# Patient Record
Sex: FEMALE | Race: BLACK OR AFRICAN AMERICAN | NOT HISPANIC OR LATINO | Employment: FULL TIME | URBAN - METROPOLITAN AREA
[De-identification: names, ages, dates, MRNs, and addresses within clinical notes are randomized per-mention and may not be internally consistent; named-entity substitution may affect disease eponyms.]

---

## 2017-07-07 ASSESSMENT — ENCOUNTER SYMPTOMS
HYPOTENSION: 0
ARTHRALGIAS: 0
ORTHOPNEA: 0
LIGHT-HEADEDNESS: 1
BACK PAIN: 1
HEMOPTYSIS: 0
BRUISES/BLEEDS EASILY: 0
RESPIRATORY PAIN: 0
POSTURAL DYSPNEA: 0
HYPERTENSION: 1
SLEEP DISTURBANCES DUE TO BREATHING: 0
COUGH: 1
EXERCISE INTOLERANCE: 0
JOINT SWELLING: 0
SPUTUM PRODUCTION: 1
MYALGIAS: 1
MUSCLE WEAKNESS: 0
MUSCLE CRAMPS: 1
SHORTNESS OF BREATH: 0
LEG SWELLING: 1
CLAUDICATION: 0
PALPITATIONS: 1
SNORES LOUDLY: 1
NECK PAIN: 1
DYSPNEA ON EXERTION: 0
SWOLLEN GLANDS: 1
WHEEZING: 0
TACHYCARDIA: 1
SYNCOPE: 0
LEG PAIN: 0
STIFFNESS: 1
COUGH DISTURBING SLEEP: 1

## 2017-07-11 ENCOUNTER — OFFICE VISIT (OUTPATIENT)
Dept: ENDOCRINOLOGY | Facility: CLINIC | Age: 47
End: 2017-07-11

## 2017-07-11 VITALS
OXYGEN SATURATION: 98 % | DIASTOLIC BLOOD PRESSURE: 95 MMHG | SYSTOLIC BLOOD PRESSURE: 145 MMHG | BODY MASS INDEX: 38.26 KG/M2 | HEIGHT: 62 IN | HEART RATE: 81 BPM | WEIGHT: 207.9 LBS

## 2017-07-11 DIAGNOSIS — E66.09 NON MORBID OBESITY DUE TO EXCESS CALORIES: Primary | ICD-10-CM

## 2017-07-11 RX ORDER — TOPIRAMATE 25 MG/1
TABLET, FILM COATED ORAL
Qty: 120 TABLET | Refills: 3 | Status: SHIPPED | OUTPATIENT
Start: 2017-07-11 | End: 2018-03-13 | Stop reason: DRUGHIGH

## 2017-07-11 RX ORDER — LISINOPRIL 10 MG/1
TABLET ORAL
COMMUNITY
Start: 2015-04-12 | End: 2021-07-28

## 2017-07-11 NOTE — MR AVS SNAPSHOT
After Visit Summary   7/11/2017    Mana Bull    MRN: [id number]           Patient Information     Date Of Birth          1970        Visit Information        Provider Department      7/11/2017 2:15 PM Justina Camilo MD [facility] Medical Weight Management        Today's Diagnoses     Non morbid obesity due to excess calories    -  1      Care Instructions    Start Topiramate today.    See Dr Horn in three months.    Call for questions or concerns [phone].          MEDICATION STARTED AT THIS APPOINTMENT  We are starting topiramate at bedtime.  Start one tab, 25 mg, for a week. Go up to 50 mg (2 tabs) for the next week. At the third week, take   3 tabs (75 mg).  Stay at 3 tabs until you are seen again. Call the nurse at [phone] if you have any questions or concerns. (Do not stop taking it if you don't think it's working. For some people it works even though they do not feel much different.)    Topiramate (Topamax) is a medication that is used most often to treat migraine headaches or for seizures. It has also been found to help with weight loss. Although it's not currently FDA approved for weight loss, it has been used safely for a number of years to help people who are carrying extra weight.     Just how topiramate helps with weight loss has not been exactly determined. However it seems to work on areas of the brain to quiet down signals related to eating.      Topiramate may make you:    >feel less interest in eating in between meals   >think less about food and eating   >find it easier to push the plate away   >find giving up pop easier    >have an easier time eating less    For some of our patients, the pills work right away. They feel and think quite differently about food. Other patients don't feel much of a change but find in fact they have lost weight! Like all weight loss medications, topiramate works best when you help it work.  This means:    1) Have less tempting  high calorie (fattening) food around the house or office    2) Have lower calorie food (fruits, vegetables,low fat meats and dairy) for snacks    3) Eat out only one time or less each week.   4) Eat your meals at a table with the TV or computer off.    Side-effects. Topiramate is generally well tolerated. The main side-effects we see are:   Tingling in hands,feet, or face (usually not very troublesome)   Mental confusion and word finding trouble (about 10% of patients have this.)     Feeling sleepy or a bit dopey- this goes away very soon after starting.    One of the dangers of topiramate is the possibility of birth defects--if you get pregnant when you are on it, there is the risk that your baby will be born with a cleft lip or palate.  If you are on topiramate and of child bearing age, you need to be on a reliable form of birth control or refrain from sexual intercourse.     Please refer to the pharmacy insert for more information on side-effects. Since many pharmacists are not familiar with the use of topiramate in weight loss, calling the clinic will get you the most accurate information on the use of this medication for weight loss.     In order to get refills of this or any medication we prescribe you must be seen in the medical weight mgmt clinic every 2-3 months. Please have your pharmacy fax a refill request to [phone].                  Follow-ups after your visit        Your next 10 appointments already scheduled     Oct 10, 2017  8:45 AM CDT   (Arrive by 8:30 AM)   Return Visit with Justina Camilo MD   [facility] ([facility])    [address] [place] [id number] [phone]              Future tests that were ordered for you today     Open Future Orders        Priority Expected Expires Ordered    Hemoglobin A1c Routine 7/11/2017 7/11/2018 7/11/2017    Creatinine Routine 7/11/2017 7/11/2018 7/11/2017    Hepatic panel  "Routine 7/11/2017 7/11/2018 7/11/2017            Who to contact     Please call your clinic at [phone] to:    Ask questions about your health    Make or cancel appointments    Discuss your medicines    Learn about your test results    Speak to your doctor   If you have compliments or concerns about an experience at your clinic, or if you wish to file a complaint, please contact [facility] Patient Relations at [phone] or email us at [email or web address]         Additional Information About Your Visit        [employer] Information     [employer] gives you secure access to your electronic health record. If you see a primary care provider, you can also send messages to your care team and make appointments. If you have questions, please call your primary care clinic.  If you do not have a primary care provider, please call [phone] and they will assist you.      [employer] is an electronic gateway that provides easy, online access to your medical records. With [employer], you can request a clinic appointment, read your test results, renew a prescription or communicate with your care team.     To access your existing account, please contact your [facility] or call [phone] for assistance.        Care EveryWhere ID     This is your Care EveryWhere ID. This could be used by other organizations to access your [facility] medical records  [id number]        Your Vitals Were     Pulse Height Pulse Oximetry BMI (Body Mass Index)          81 1.575 m (5' 2\") 98% 38.03 kg/m2         Blood Pressure from Last 3 Encounters:   07/11/17 (!) 145/95    Weight from Last 3 Encounters:   07/11/17 94.3 kg (207 lb 14.4 oz)                 Today's Medication Changes          These changes are accurate as of: 7/11/17  2:41 PM.  If you have any questions, ask your nurse or doctor.               Start taking these medicines.        Dose/Directions    topiramate 25 MG tablet "   Commonly known as:  TOPAMAX   Used for:  Non morbid obesity due to excess calories   Started by:  Justina Camilo MD        25 mg at bedtime for 1 week, 50 mg at bedtime for 1 week and 75 mg daily at bedtime thereafter   Quantity:  120 tablet   Refills:  3            Where to get your medicines      These medications were sent to [place], [place] - [address]  [address], [facility] [address]    Hours:  TRANSPLANT PHONE NUMBER [phone] Phone:  [phone]     topiramate 25 MG tablet                Primary Care Provider    None Specified       No primary provider on file.        Equal Access to Services     [facility] : Hadfide Rosas, misty mullen, reynaldo harveymajayne childress, robert crawley. So [facility] [phone].    ATENCIÓN: Si habla español, tiene a mclaughlin disposición servicios gratuitos de asistencia lingüística. Llame al [phone].    We comply with applicable federal civil rights laws and [place] laws. We do not discriminate on the basis of race, color, national origin, age, disability sex, sexual orientation or gender identity.            Thank you!     Thank you for choosing [facility] MEDICAL WEIGHT MANAGEMENT  for your care. Our goal is always to provide you with excellent care. Hearing back from our patients is one way we can continue to improve our services. Please take a few minutes to complete the written survey that you may receive in the mail after your visit with us. Thank you!             Your Updated Medication List - Protect others around you: Learn how to safely use, store and throw away your medicines at www.disposemymeds.org.          This list is accurate as of: 7/11/17  2:41 PM.  Always use your most recent med list.                   Brand Name Dispense Instructions for use Diagnosis    lisinopril 10 MG tablet    PRINIVIL/ZESTRIL      Non morbid obesity due to  excess calories       topiramate 25 MG tablet    TOPAMAX    120 tablet    25 mg at bedtime for 1 week, 50 mg at bedtime for 1 week and 75 mg daily at bedtime thereafter    Non morbid obesity due to excess calories

## 2017-07-11 NOTE — NURSING NOTE
"Chief Complaint   Patient presents with     Weight Problem     NMWM       Vitals:    07/11/17 1405   BP: (!) 145/95   Pulse: 81   SpO2: 98%   Weight: 207 lb 14.4 oz   Height: 5' 2\"       Body mass index is 38.03 kg/(m^2).    Faby Camp CMA                          "

## 2017-07-11 NOTE — LETTER
Date:July 13, 2017      Patient was self referred, no letter generated. Do not send.        [facility] Health Information

## 2017-07-11 NOTE — PATIENT INSTRUCTIONS
Start Topiramate today.    See Dr Horn in three months.    Call for questions or concerns [phone].          MEDICATION STARTED AT THIS APPOINTMENT  We are starting topiramate at bedtime.  Start one tab, 25 mg, for a week. Go up to 50 mg (2 tabs) for the next week. At the third week, take   3 tabs (75 mg).  Stay at 3 tabs until you are seen again. Call the nurse at [phone] if you have any questions or concerns. (Do not stop taking it if you don't think it's working. For some people it works even though they do not feel much different.)    Topiramate (Topamax) is a medication that is used most often to treat migraine headaches or for seizures. It has also been found to help with weight loss. Although it's not currently FDA approved for weight loss, it has been used safely for a number of years to help people who are carrying extra weight.     Just how topiramate helps with weight loss has not been exactly determined. However it seems to work on areas of the brain to quiet down signals related to eating.      Topiramate may make you:    >feel less interest in eating in between meals   >think less about food and eating   >find it easier to push the plate away   >find giving up pop easier    >have an easier time eating less    For some of our patients, the pills work right away. They feel and think quite differently about food. Other patients don't feel much of a change but find in fact they have lost weight! Like all weight loss medications, topiramate works best when you help it work.  This means:    1) Have less tempting high calorie (fattening) food around the house or office    2) Have lower calorie food (fruits, vegetables,low fat meats and dairy) for snacks    3) Eat out only one time or less each week.   4) Eat your meals at a table with the TV or computer off.    Side-effects. Topiramate is generally well tolerated. The main side-effects we see are:   Tingling in hands,feet, or face (usually not very  troublesome)   Mental confusion and word finding trouble (about 10% of patients have this.)     Feeling sleepy or a bit dopey- this goes away very soon after starting.    One of the dangers of topiramate is the possibility of birth defects--if you get pregnant when you are on it, there is the risk that your baby will be born with a cleft lip or palate.  If you are on topiramate and of child bearing age, you need to be on a reliable form of birth control or refrain from sexual intercourse.     Please refer to the pharmacy insert for more information on side-effects. Since many pharmacists are not familiar with the use of topiramate in weight loss, calling the clinic will get you the most accurate information on the use of this medication for weight loss.     In order to get refills of this or any medication we prescribe you must be seen in the medical weight mgmt clinic every 2-3 months. Please have your pharmacy fax a refill request to [phone].
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
## 2017-07-25 DIAGNOSIS — E66.09 NON MORBID OBESITY DUE TO EXCESS CALORIES: ICD-10-CM

## 2017-07-25 LAB
ALBUMIN SERPL-MCNC: 3.4 G/DL (ref 3.4–5)
ALP SERPL-CCNC: 116 U/L (ref 40–150)
ALT SERPL W P-5'-P-CCNC: 20 U/L (ref 0–50)
AST SERPL W P-5'-P-CCNC: 14 U/L (ref 0–45)
BILIRUB DIRECT SERPL-MCNC: <0.1 MG/DL (ref 0–0.2)
BILIRUB SERPL-MCNC: 0.3 MG/DL (ref 0.2–1.3)
CREAT SERPL-MCNC: 0.69 MG/DL (ref 0.52–1.04)
GFR SERPL CREATININE-BSD FRML MDRD: NORMAL ML/MIN/1.7M2
HBA1C MFR BLD: 6 % (ref 4.3–6)
PROT SERPL-MCNC: 7 G/DL (ref 6.8–8.8)

## 2017-08-05 ENCOUNTER — HEALTH MAINTENANCE LETTER (OUTPATIENT)
Age: 47
End: 2017-08-05

## 2017-10-10 ENCOUNTER — OFFICE VISIT (OUTPATIENT)
Dept: ENDOCRINOLOGY | Facility: CLINIC | Age: 47
End: 2017-10-10

## 2017-10-10 VITALS
BODY MASS INDEX: 34.89 KG/M2 | TEMPERATURE: 98.7 F | OXYGEN SATURATION: 97 % | SYSTOLIC BLOOD PRESSURE: 105 MMHG | HEIGHT: 62 IN | DIASTOLIC BLOOD PRESSURE: 72 MMHG | HEART RATE: 85 BPM | WEIGHT: 189.6 LBS

## 2017-10-10 DIAGNOSIS — E66.09 CLASS 1 OBESITY DUE TO EXCESS CALORIES WITHOUT SERIOUS COMORBIDITY WITH BODY MASS INDEX (BMI) OF 34.0 TO 34.9 IN ADULT: Primary | ICD-10-CM

## 2017-10-10 DIAGNOSIS — E66.811 CLASS 1 OBESITY DUE TO EXCESS CALORIES WITHOUT SERIOUS COMORBIDITY WITH BODY MASS INDEX (BMI) OF 34.0 TO 34.9 IN ADULT: Primary | ICD-10-CM

## 2017-10-10 ASSESSMENT — PAIN SCALES - GENERAL: PAINLEVEL: NO PAIN (0)

## 2017-10-10 NOTE — NURSING NOTE
"Chief Complaint   Patient presents with     RECHECK     R WMN       Vitals:    10/10/17 0924   BP: 105/72   BP Location: Left arm   Patient Position: Chair   Cuff Size: Adult Regular   Pulse: 85   Temp: 98.7  F (37.1  C)   SpO2: 97%   Weight: 189 lb 9.6 oz   Height: 5' 2\"       Body mass index is 34.68 kg/(m^2).      Kena Mcfarland MA                          "

## 2017-10-10 NOTE — LETTER
"10/10/2017       RE: Mana Bull  [address] [place] [address]     Dear Colleague,    Thank you for referring your patient, Mana Bull, to the [facility] MEDICAL WEIGHT MANAGEMENT at [facility]. Please see a copy of my visit note below.          Return Medical Weight Management Note     Mana Bull  MRN:  [id number]  :  1970  LORENE:  10/10/2017    Dear No primary care provider on file.,    I had the pleasure of seeing your patient Mana Bull.  She is a 46 year old female who I am continuing to see for treatment of obesity related to: hypertension, hyperlipidemia, multiple joints pain    INTERVAL HISTORY:  Last visit 17. I started her on topiramate which has helped cutting down her appetite. She denied side effects. She lost 18 lbs in the past 3 months. She stated that she has been able to cut down snack. However, she still drinks some regular soda. She follows VDO exercise program at home. She is motivated to continue with dietary modification.    CURRENT WEIGHT:   189 lbs 9.6 oz    Wt Readings from Last 4 Encounters:   10/10/17 86 kg (189 lb 9.6 oz)   17 94.3 kg (207 lb 14.4 oz)       Height:  5' 2\"  Body Mass Index:  Body mass index is 34.68 kg/(m^2).  Vital signs:   /72 (BP Location: Left arm, Patient Position: Chair, Cuff Size: Adult Regular)  Pulse 85  Temp 98.7  F (37.1  C)  Ht 1.575 m (5' 2\")  Wt 86 kg (189 lb 9.6 oz)  SpO2 97%  BMI 34.68 kg/m2  Estimated body mass index is 34.68 kg/(m^2) as calculated from the following:    Height as of this encounter: 1.575 m (5' 2\").    Weight as of this encounter: 86 kg (189 lb 9.6 oz).    Initial consult weight was 207 on 17.  Weight change since last seen on 2017 is down 18 pounds.   Total loss is 18 pounds.    Diet and Activity Changes Since Last Visit Reviewed With Patient 10/10/2017   I have made the following changes to my diet since my last visit: less sweets and " pop   With regards to my diet, I am still struggling with: exersice   For breakfast, I typically eat: oatmeal   For lunch, I typically eat: sandwich   For supper, I typically eat: salad pizza   For snack(s), I typically eat: popcorn chips   I have made the following changes to my activity/exercise since my last visit: none   With regards to my activity/exercise, I am still struggling with: energy     ROS    MEDICATIONS:   Current Outpatient Prescriptions   Medication Sig Dispense Refill     lisinopril (PRINIVIL/ZESTRIL) 10 MG tablet        topiramate (TOPAMAX) 25 MG tablet 25 mg at bedtime for 1 week, 50 mg at bedtime for 1 week and 75 mg daily at bedtime thereafter 120 tablet 3       Weight Loss Medication History Reviewed With Patient 10/10/2017   Which weight loss medications are you currently taking on a regular basis?  Topamax (topiramate)   Are you having any side effects from the weight loss medication that we have prescribed you? No       ASSESSMENT:   Mana Bull is a 46 year old female who I am continuing to see for treatment of obesity related to: hypertension, hyperlipidemia, multiple joints pain    Responded well to topiramate  Lost 18 lbs since last visit    PLAN:  - continue topiramate 75 mg daily  - continue diet modification and exercise    FOLLOW-UP:    4 months.    Time: 15 min spent on evaluation, management, counseling, education, & motivational interviewing with greater than 50 % of the total time was spent on counseling and coordinating care    Sincerely,    Justina Camilo MD    Again, thank you for allowing me to participate in the care of your patient.      Sincerely,    Justina Caimlo MD

## 2017-10-10 NOTE — MR AVS SNAPSHOT
After Visit Summary   10/10/2017    Mana Bull    MRN: [id number]           Patient Information     Date Of Birth          1970        Visit Information        Provider Department      10/10/2017 8:45 AM Justina Camilo MD M [facility] Medical Weight Management        Today's Diagnoses     Class 1 obesity due to excess calories without serious comorbidity with body mass index (BMI) of 34.0 to 34.9 in adult    -  1       Follow-ups after your visit        Your next 10 appointments already scheduled     Mar 13, 2018  4:30 PM CDT   (Arrive by 4:15 PM)   Return Visit with MD CAL Ponce [facility] Medical Weight Management ([facility])    [address]  [address] [facility] [address] [phone]              Who to contact     Please call your clinic at [phone] to:    Ask questions about your health    Make or cancel appointments    Discuss your medicines    Learn about your test results    Speak to your doctor   If you have compliments or concerns about an experience at your clinic, or if you wish to file a complaint, please contact [facility] Patient Relations at [phone] or email us at [email or web address]         Additional Information About Your Visit        MyChart Information     [employer] gives you secure access to your electronic health record. If you see a primary care provider, you can also send messages to your care team and make appointments. If you have questions, please call your primary care clinic.  If you do not have a primary care provider, please call [phone] and they will assist you.      [employer] is an electronic gateway that provides easy, online access to your medical records. With [employer], you can request a clinic appointment, read your test results, renew a prescription or communicate with your care team.     To access your existing account, please contact your  "[facility] or call [phone] for assistance.        Care EveryWhere ID     This is your Care EveryWhere ID. This could be used by other organizations to access your [facility] medical records  [id number]        Your Vitals Were     Pulse Temperature Height Pulse Oximetry BMI (Body Mass Index)       85 98.7  F (37.1  C) 1.575 m (5' 2\") 97% 34.68 kg/m2        Blood Pressure from Last 3 Encounters:   10/10/17 105/72   07/11/17 (!) 145/95    Weight from Last 3 Encounters:   10/10/17 86 kg (189 lb 9.6 oz)   07/11/17 94.3 kg (207 lb 14.4 oz)              Today, you had the following     No orders found for display       Primary Care Provider    None Specified       No primary provider on file.        Equal Access to Services     [facility]: Hadii samara Rosas, wacharly mullen, reynaldo quintanaaledenilson childress, robert olivera . So [facility] [phone].    ATENCIÓN: Si habla español, tiene a mclaughlin disposición servicios gratuitos de asistencia lingüística. Elyse al [phone].    We comply with applicable federal civil rights laws and [place] laws. We do not discriminate on the basis of race, color, national origin, age, disability, sex, sexual orientation, or gender identity.            Thank you!     Thank you for choosing [facility] WEIGHT MANAGEMENT  for your care. Our goal is always to provide you with excellent care. Hearing back from our patients is one way we can continue to improve our services. Please take a few minutes to complete the written survey that you may receive in the mail after your visit with us. Thank you!             Your Updated Medication List - Protect others around you: Learn how to safely use, store and throw away your medicines at www.disposemymeds.org.          This list is accurate as of: 10/10/17 12:56 PM.  Always use your most recent med list.                   Brand Name Dispense Instructions for use Diagnosis    " lisinopril 10 MG tablet    PRINIVIL/ZESTRIL      Non morbid obesity due to excess calories       topiramate 25 MG tablet    TOPAMAX    120 tablet    25 mg at bedtime for 1 week, 50 mg at bedtime for 1 week and 75 mg daily at bedtime thereafter    Non morbid obesity due to excess calories

## 2017-10-10 NOTE — LETTER
Date:October 11, 2017      Patient was self referred, no letter generated. Do not send.        [facility] Health Information

## 2017-10-10 NOTE — PROGRESS NOTES
"      Return Medical Weight Management Note     Mana Bull  MRN:  [id number]  :  1970  LORENE:  10/10/2017    Dear No primary care provider on file.,    I had the pleasure of seeing your patient Mana Bull.  She is a 46 year old female who I am continuing to see for treatment of obesity related to: hypertension, hyperlipidemia, multiple joints pain    INTERVAL HISTORY:  Last visit 17. I started her on topiramate which has helped cutting down her appetite. She denied side effects. She lost 18 lbs in the past 3 months. She stated that she has been able to cut down snack. However, she still drinks some regular soda. She follows [employer] exercise program at home. She is motivated to continue with dietary modification.    CURRENT WEIGHT:   189 lbs 9.6 oz    Wt Readings from Last 4 Encounters:   10/10/17 86 kg (189 lb 9.6 oz)   17 94.3 kg (207 lb 14.4 oz)       Height:  5' 2\"  Body Mass Index:  Body mass index is 34.68 kg/(m^2).  Vital signs:   /72 (BP Location: Left arm, Patient Position: Chair, Cuff Size: Adult Regular)  Pulse 85  Temp 98.7  F (37.1  C)  Ht 1.575 m (5' 2\")  Wt 86 kg (189 lb 9.6 oz)  SpO2 97%  BMI 34.68 kg/m2  Estimated body mass index is 34.68 kg/(m^2) as calculated from the following:    Height as of this encounter: 1.575 m (5' 2\").    Weight as of this encounter: 86 kg (189 lb 9.6 oz).    Initial consult weight was 207 on 17.  Weight change since last seen on 2017 is down 18 pounds.   Total loss is 18 pounds.    Diet and Activity Changes Since Last Visit Reviewed With Patient 10/10/2017   I have made the following changes to my diet since my last visit: less sweets and pop   With regards to my diet, I am still struggling with: exersice   For breakfast, I typically eat: oatmeal   For lunch, I typically eat: sandwich   For supper, I typically eat: salad pizza   For snack(s), I typically eat: popcorn chips   I have made the following changes to my " activity/exercise since my last visit: none   With regards to my activity/exercise, I am still struggling with: energy     ROS    MEDICATIONS:   Current Outpatient Prescriptions   Medication Sig Dispense Refill     lisinopril (PRINIVIL/ZESTRIL) 10 MG tablet        topiramate (TOPAMAX) 25 MG tablet 25 mg at bedtime for 1 week, 50 mg at bedtime for 1 week and 75 mg daily at bedtime thereafter 120 tablet 3       Weight Loss Medication History Reviewed With Patient 10/10/2017   Which weight loss medications are you currently taking on a regular basis?  Topamax (topiramate)   Are you having any side effects from the weight loss medication that we have prescribed you? No       ASSESSMENT:   Mana Bull is a 46 year old female who I am continuing to see for treatment of obesity related to: hypertension, hyperlipidemia, multiple joints pain    Responded well to topiramate  Lost 18 lbs since last visit    PLAN:  - continue topiramate 75 mg daily  - continue diet modification and exercise    FOLLOW-UP:    4 months.    Time: 15 min spent on evaluation, management, counseling, education, & motivational interviewing with greater than 50 % of the total time was spent on counseling and coordinating care    Sincerely,    Justina Camilo MD

## 2017-11-09 ENCOUNTER — TELEPHONE (OUTPATIENT)
Dept: ENDOCRINOLOGY | Facility: CLINIC | Age: 47
End: 2017-11-09

## 2017-11-09 DIAGNOSIS — E66.01 MORBID OBESITY (H): Primary | ICD-10-CM

## 2017-11-09 RX ORDER — PHENTERMINE HYDROCHLORIDE 15 MG/1
15 CAPSULE ORAL EVERY MORNING
Qty: 30 CAPSULE | Refills: 3 | Status: SHIPPED | OUTPATIENT
Start: 2017-11-09 | End: 2017-12-09

## 2017-11-09 NOTE — TELEPHONE ENCOUNTER
Patient voices concerns that she is having a increase in cravings, mostly sweets and chocolate. Also has a increase in food thoughts in the evening. Patient states that her B/P has been WNL and is 119/80 today. Phentermine 15 mg ordered  Per Tresa Regalado PA-C.

## 2017-11-22 DIAGNOSIS — E66.09 NON MORBID OBESITY DUE TO EXCESS CALORIES: ICD-10-CM

## 2017-12-28 ENCOUNTER — TELEPHONE (OUTPATIENT)
Dept: GASTROENTEROLOGY | Facility: CLINIC | Age: 47
End: 2017-12-28

## 2017-12-28 DIAGNOSIS — E66.01 MORBID OBESITY DUE TO EXCESS CALORIES (H): Primary | ICD-10-CM

## 2017-12-28 RX ORDER — PHENTERMINE HYDROCHLORIDE 37.5 MG/1
37.5 CAPSULE ORAL EVERY MORNING
Qty: 30 CAPSULE | Refills: 3 | Status: SHIPPED | OUTPATIENT
Start: 2017-12-28 | End: 2018-01-27

## 2017-12-28 RX ORDER — PHENTERMINE HYDROCHLORIDE 37.5 MG/1
37.5 CAPSULE ORAL EVERY MORNING
Qty: 30 CAPSULE | Refills: 5 | Status: CANCELLED | OUTPATIENT
Start: 2017-12-28

## 2017-12-28 NOTE — TELEPHONE ENCOUNTER
Having increased hunger and cravings. B/p 125/78. Feeling well without side effects. Requesting to increase Phentermine dose to 37.5 mg tablet.

## 2018-03-13 ENCOUNTER — OFFICE VISIT (OUTPATIENT)
Dept: ENDOCRINOLOGY | Facility: CLINIC | Age: 48
End: 2018-03-13
Payer: COMMERCIAL

## 2018-03-13 VITALS
HEART RATE: 83 BPM | SYSTOLIC BLOOD PRESSURE: 125 MMHG | DIASTOLIC BLOOD PRESSURE: 80 MMHG | OXYGEN SATURATION: 99 % | WEIGHT: 193.8 LBS | BODY MASS INDEX: 35.66 KG/M2 | HEIGHT: 62 IN

## 2018-03-13 DIAGNOSIS — E66.09 CLASS 2 OBESITY DUE TO EXCESS CALORIES WITHOUT SERIOUS COMORBIDITY WITH BODY MASS INDEX (BMI) OF 35.0 TO 35.9 IN ADULT: Primary | ICD-10-CM

## 2018-03-13 DIAGNOSIS — E66.812 CLASS 2 OBESITY DUE TO EXCESS CALORIES WITHOUT SERIOUS COMORBIDITY WITH BODY MASS INDEX (BMI) OF 35.0 TO 35.9 IN ADULT: Primary | ICD-10-CM

## 2018-03-13 RX ORDER — PHENTERMINE HYDROCHLORIDE 37.5 MG/1
CAPSULE ORAL
COMMUNITY
Start: 2018-02-02 | End: 2018-03-13

## 2018-03-13 RX ORDER — PHENTERMINE HYDROCHLORIDE 37.5 MG/1
37.5 TABLET ORAL
Qty: 30 TABLET | Refills: 2 | Status: SHIPPED | OUTPATIENT
Start: 2018-03-13 | End: 2021-07-28

## 2018-03-13 RX ORDER — TOPIRAMATE 100 MG/1
100 TABLET, FILM COATED ORAL DAILY
Qty: 90 TABLET | Refills: 1 | Status: SHIPPED | OUTPATIENT
Start: 2018-03-13 | End: 2021-07-28

## 2018-03-13 NOTE — PATIENT INSTRUCTIONS
Continue phentermine 37.5 mg daily  Increase topiramate to 100 mg at bedtime  Please take a look at this website for resources and recipes [email or web address]  See me in 4 months    If you have any questions, please do not hesitate to call Weight management clinic at [phone] or [phone].    Sincerely,    Justina Camilo MD  Endocrinology

## 2018-03-13 NOTE — NURSING NOTE
"Chief Complaint   Patient presents with     Weight Problem     RMWM       Vitals:    03/13/18 1100   BP: 125/80   Pulse: 83   SpO2: 99%   Weight: 193 lb 12.8 oz   Height: 5' 2\"       Body mass index is 35.45 kg/(m^2).    Faby Camp CMA                          "

## 2018-03-13 NOTE — MR AVS SNAPSHOT
After Visit Summary   3/13/2018    Mana Bull    MRN: [id number]           Patient Information     Date Of Birth          1970        Visit Information        Provider Department      3/13/2018 4:30 PM Justina Camilo MD [facility] Medical Weight Management        Today's Diagnoses     Class 2 obesity due to excess calories without serious comorbidity with body mass index (BMI) of 35.0 to 35.9 in adult    -  1      Care Instructions    Continue phentermine 37.5 mg daily  Increase topiramate to 100 mg at bedtime  Please take a look at this website for resources and recipes [email or web address]  See me in 4 months    If you have any questions, please do not hesitate to call Weight management clinic at [phone] or [phone].    Sincerely,    Justina Camilo MD  Endocrinology            Follow-ups after your visit        Your next 10 appointments already scheduled     Mar 13, 2018  4:30 PM CDT   (Arrive by 4:15 PM)   Return Visit with MD CAL Ponce [facility] Medical Weight Management ([facility])    [address] [place] [id number] [phone]              Who to contact     Please call your clinic at [phone] to:    Ask questions about your health    Make or cancel appointments    Discuss your medicines    Learn about your test results    Speak to your doctor            Additional Information About Your Visit        [employer] Information     [employer] gives you secure access to your electronic health record. If you see a primary care provider, you can also send messages to your care team and make appointments. If you have questions, please call your primary care clinic.  If you do not have a primary care provider, please call [phone] and they will assist you.      [employer] is an electronic gateway that provides easy, online access to your medical records. With [employer], you can request a clinic  "appointment, read your test results, renew a prescription or communicate with your care team.     To access your existing account, please contact your [facility] or call [phone] for assistance.        Care EveryWhere ID     This is your Care EveryWhere ID. This could be used by other organizations to access your [place] medical records  [id number]        Your Vitals Were     Pulse Height Pulse Oximetry BMI (Body Mass Index)          83 1.575 m (5' 2\") 99% 35.45 kg/m2         Blood Pressure from Last 3 Encounters:   03/13/18 125/80   10/10/17 105/72   07/11/17 (!) 145/95    Weight from Last 3 Encounters:   03/13/18 87.9 kg (193 lb 12.8 oz)   10/10/17 86 kg (189 lb 9.6 oz)   07/11/17 94.3 kg (207 lb 14.4 oz)              Today, you had the following     No orders found for display         Today's Medication Changes          These changes are accurate as of 3/13/18 11:23 AM.  If you have any questions, ask your nurse or doctor.               Start taking these medicines.        Dose/Directions    phentermine 37.5 MG tablet   Commonly known as:  ADIPEX-P   Used for:  Class 2 obesity due to excess calories without serious comorbidity with body mass index (BMI) of 35.0 to 35.9 in adult   Replaces:  phentermine 37.5 MG capsule   Started by:  Justina Camilo MD        Dose:  37.5 mg   Take 1 tablet (37.5 mg) by mouth every morning (before breakfast)   Quantity:  30 tablet   Refills:  2         These medicines have changed or have updated prescriptions.        Dose/Directions    topiramate 100 MG tablet   Commonly known as:  TOPAMAX   This may have changed:    - medication strength  - how much to take  - how to take this  - when to take this  - additional instructions   Used for:  Class 2 obesity due to excess calories without serious comorbidity with body mass index (BMI) of 35.0 to 35.9 in adult   Changed by:  Justina Camilo MD        Dose:  100 mg   Take 1 tablet " (100 mg) by mouth daily   Quantity:  90 tablet   Refills:  1         Stop taking these medicines if you haven't already. Please contact your care team if you have questions.     phentermine 37.5 MG capsule   Replaced by:  phentermine 37.5 MG tablet   Stopped by:  Justina Camilo MD                Where to get your medicines      These medications were sent to [place], [place] - [phone]  [phone], [facility] [address]    Hours:  TRANSPLANT PHONE NUMBER [phone] Phone:  [phone]     topiramate 100 MG tablet         Some of these will need a paper prescription and others can be bought over the counter.  Ask your nurse if you have questions.     Bring a paper prescription for each of these medications     phentermine 37.5 MG tablet                Primary Care Provider    None Specified       No primary provider on file.        Equal Access to Services     [facility] : Jose Rosas, misty mullen, reynaldo childress, robert olivera . So [facility] [phone].    ATENCIÓN: Si habla español, tiene a mclaughlin disposición servicios gratuitos de asistencia lingüística. Llame al [phone].    We comply with applicable federal civil rights laws and [place] laws. We do not discriminate on the basis of race, color, national origin, age, disability, sex, sexual orientation, or gender identity.            Thank you!     Thank you for choosing [facility] WEIGHT MANAGEMENT  for your care. Our goal is always to provide you with excellent care. Hearing back from our patients is one way we can continue to improve our services. Please take a few minutes to complete the written survey that you may receive in the mail after your visit with us. Thank you!             Your Updated Medication List - Protect others around you: Learn how to safely use, store and throw away your medicines at  www.disposemymeds.org.          This list is accurate as of 3/13/18 11:23 AM.  Always use your most recent med list.                   Brand Name Dispense Instructions for use Diagnosis    lisinopril 10 MG tablet    PRINIVIL/ZESTRIL      Non morbid obesity due to excess calories       phentermine 37.5 MG tablet    ADIPEX-P    30 tablet    Take 1 tablet (37.5 mg) by mouth every morning (before breakfast)    Class 2 obesity due to excess calories without serious comorbidity with body mass index (BMI) of 35.0 to 35.9 in adult       topiramate 100 MG tablet    TOPAMAX    90 tablet    Take 1 tablet (100 mg) by mouth daily    Class 2 obesity due to excess calories without serious comorbidity with body mass index (BMI) of 35.0 to 35.9 in adult

## 2018-03-13 NOTE — LETTER
"3/13/2018       RE: Mana Bull  [address] [place] [address]     Dear Colleague,    Thank you for referring your patient, Mana Bull, to the [facility] MEDICAL WEIGHT MANAGEMENT at [facility]. Please see a copy of my visit note below.    Return Medical Weight Management Note     Mana Bull  MRN:  [id number]  :  1970  LORENE:  3/13/2018    Dear No primary care provider on file.,    I had the pleasure of seeing your patient Mana Bull.  She is a 47 year old female who I am continuing to see for treatment of obesity related to: hypertension, hyperlipidemia, multiple joints pain    INTERVAL HISTORY:  Last visit 10/10/17. She is currently on topiramate 75 mg and phentermine 37.5 mg daily. Phentermine was added 2 months ago due to increased appetite. She gained about 4 lbs in 4 months. She said that she did not do a good job during holiday. She is planning to restart on diet plan again by tracking calories (keep <1200 Josiah per day), cutting down soda and exercising (joing the gym). She continues to have good motivation.    CURRENT WEIGHT:   193 lbs 12.8 oz    Wt Readings from Last 4 Encounters:   18 87.9 kg (193 lb 12.8 oz)   10/10/17 86 kg (189 lb 9.6 oz)   17 94.3 kg (207 lb 14.4 oz)       Height:  5' 2\"  Body Mass Index:  Body mass index is 35.45 kg/(m^2).  Vital signs:   /80  Pulse 83  Ht 1.575 m (5' 2\")  Wt 87.9 kg (193 lb 12.8 oz)  SpO2 99%  BMI 35.45 kg/m2  Estimated body mass index is 35.45 kg/(m^2) as calculated from the following:    Height as of this encounter: 1.575 m (5' 2\").    Weight as of this encounter: 87.9 kg (193 lb 12.8 oz).    Initial consult weight was 207 on 17.  Weight change since last seen on 10/10/2017 is up 4 pounds.   Total loss is 14 pounds.    Diet and Activity Changes Since Last Visit Reviewed With Patient 3/9/2018   I have made the following changes to my diet since my last visit: No More " Pop Fast Food Or Junk Food, More Exercise   With regards to my diet, I am still struggling with: Energy   For breakfast, I typically eat: Nothing   For lunch, I typically eat: Veggies, [place],    For supper, I typically eat: Chicken, Rice, Salads   For snack(s), I typically eat: Hummus, Crackers   I have made the following changes to my activity/exercise since my last visit: more walking, exercise bike   With regards to my activity/exercise, I am still struggling with: energy     ROS    MEDICATIONS:   Current Outpatient Prescriptions   Medication Sig Dispense Refill     phentermine 37.5 MG capsule        lisinopril (PRINIVIL/ZESTRIL) 10 MG tablet        topiramate (TOPAMAX) 25 MG tablet 25 mg at bedtime for 1 week, 50 mg at bedtime for 1 week and 75 mg daily at bedtime thereafter 120 tablet 3       Weight Loss Medication History Reviewed With Patient 3/9/2018   Which weight loss medications are you currently taking on a regular basis?  Phentermine, Topamax (topiramate)   Are you having any side effects from the weight loss medication that we have prescribed you? No       ASSESSMENT:   Mana Bull is a 47 year old female who I am continuing to see for treatment of obesity related to: hypertension, hyperlipidemia, multiple joints pain    Responded well to topiramate and phentermine   Main barrier is craving but she has planned to go back on calories tracking and exercising again  Gained 4 lbs since last visit    PLAN:  - increase topiramate 100 mg at bedtime  - continue phentermine 37.5 mg in AM  - emphasize on diet modification and exercise    FOLLOW-UP:    4 months.    Time: 15 min spent on evaluation, management, counseling, education, & motivational interviewing with greater than 50 % of the total time was spent on counseling and coordinating care    Sincerely,    Justina Camilo MD

## 2018-03-13 NOTE — PROGRESS NOTES
"      Return Medical Weight Management Note     Mana Bull  MRN:  [id number]  :  1970  LORENE:  3/13/2018    Dear No primary care provider on file.,    I had the pleasure of seeing your patient Mana Bull.  She is a 47 year old female who I am continuing to see for treatment of obesity related to: hypertension, hyperlipidemia, multiple joints pain    INTERVAL HISTORY:  Last visit 10/10/17. She is currently on topiramate 75 mg and phentermine 37.5 mg daily. Phentermine was added 2 months ago due to increased appetite. She gained about 4 lbs in 4 months. She said that she did not do a good job during holiday. She is planning to restart on diet plan again by tracking calories (keep <1200 Josiah per day), cutting down soda and exercising (joing the gym). She continues to have good motivation.    CURRENT WEIGHT:   193 lbs 12.8 oz    Wt Readings from Last 4 Encounters:   18 87.9 kg (193 lb 12.8 oz)   10/10/17 86 kg (189 lb 9.6 oz)   17 94.3 kg (207 lb 14.4 oz)       Height:  5' 2\"  Body Mass Index:  Body mass index is 35.45 kg/(m^2).  Vital signs:   /80  Pulse 83  Ht 1.575 m (5' 2\")  Wt 87.9 kg (193 lb 12.8 oz)  SpO2 99%  BMI 35.45 kg/m2  Estimated body mass index is 35.45 kg/(m^2) as calculated from the following:    Height as of this encounter: 1.575 m (5' 2\").    Weight as of this encounter: 87.9 kg (193 lb 12.8 oz).    Initial consult weight was 207 on 17.  Weight change since last seen on 10/10/2017 is up 4 pounds.   Total loss is 14 pounds.    Diet and Activity Changes Since Last Visit Reviewed With Patient 3/9/2018   I have made the following changes to my diet since my last visit: No More Pop Fast Food Or Junk Food, More Exercise   With regards to my diet, I am still struggling with: Energy   For breakfast, I typically eat: Nothing   For lunch, I typically eat: Veggies, [place],    For supper, I typically eat: Chicken, Rice, Salads   For snack(s), I typically eat: Dutch, " Fabrice   I have made the following changes to my activity/exercise since my last visit: more walking, exercise bike   With regards to my activity/exercise, I am still struggling with: energy     ROS    MEDICATIONS:   Current Outpatient Prescriptions   Medication Sig Dispense Refill     phentermine 37.5 MG capsule        lisinopril (PRINIVIL/ZESTRIL) 10 MG tablet        topiramate (TOPAMAX) 25 MG tablet 25 mg at bedtime for 1 week, 50 mg at bedtime for 1 week and 75 mg daily at bedtime thereafter 120 tablet 3       Weight Loss Medication History Reviewed With Patient 3/9/2018   Which weight loss medications are you currently taking on a regular basis?  Phentermine, Topamax (topiramate)   Are you having any side effects from the weight loss medication that we have prescribed you? No       ASSESSMENT:   Mana Bull is a 47 year old female who I am continuing to see for treatment of obesity related to: hypertension, hyperlipidemia, multiple joints pain    Responded well to topiramate and phentermine   Main barrier is craving but she has planned to go back on calories tracking and exercising again  Gained 4 lbs since last visit    PLAN:  - increase topiramate 100 mg at bedtime  - continue phentermine 37.5 mg in AM  - emphasize on diet modification and exercise    FOLLOW-UP:    4 months.    Time: 15 min spent on evaluation, management, counseling, education, & motivational interviewing with greater than 50 % of the total time was spent on counseling and coordinating care    Sincerely,    Justina Camilo MD

## 2019-03-14 ENCOUNTER — RECORDS - HEALTHEAST (OUTPATIENT)
Dept: LAB | Facility: HOSPITAL | Age: 49
End: 2019-03-14

## 2019-03-15 LAB
HBV SURFACE AB SERPL IA-ACNC: NEGATIVE M[IU]/ML
MEV IGG SER IA-ACNC: POSITIVE
MUV IGG SER QL IA: POSITIVE
RUBV IGG SERPL QL IA: POSITIVE
VZV IGG SER QL IA: POSITIVE

## 2019-03-18 LAB
GAMMA INTERFERON BACKGROUND BLD IA-ACNC: 0.04 IU/ML
M TB IFN-G BLD-IMP: NEGATIVE
MITOGEN IGNF BCKGRD COR BLD-ACNC: -0.01 IU/ML
MITOGEN IGNF BCKGRD COR BLD-ACNC: 0 IU/ML
QTF INTERPRETATION: NORMAL
QTF MITOGEN - NIL: 7.99 IU/ML

## 2019-05-06 ENCOUNTER — COMMUNICATION - HEALTHEAST (OUTPATIENT)
Dept: SURGERY | Facility: CLINIC | Age: 49
End: 2019-05-06

## 2019-06-03 ENCOUNTER — OFFICE VISIT - HEALTHEAST (OUTPATIENT)
Dept: FAMILY MEDICINE | Facility: CLINIC | Age: 49
End: 2019-06-03

## 2019-06-03 DIAGNOSIS — R73.03 PREDIABETES: ICD-10-CM

## 2019-06-03 DIAGNOSIS — Z13.21 SCREENING FOR ENDOCRINE, NUTRITIONAL, METABOLIC AND IMMUNITY DISORDER: ICD-10-CM

## 2019-06-03 DIAGNOSIS — I10 BENIGN ESSENTIAL HYPERTENSION: ICD-10-CM

## 2019-06-03 DIAGNOSIS — Z13.29 SCREENING FOR ENDOCRINE, NUTRITIONAL, METABOLIC AND IMMUNITY DISORDER: ICD-10-CM

## 2019-06-03 DIAGNOSIS — R63.5 WEIGHT GAIN: ICD-10-CM

## 2019-06-03 DIAGNOSIS — Z13.1 SCREENING FOR DIABETES MELLITUS: ICD-10-CM

## 2019-06-03 DIAGNOSIS — E66.813 CLASS 3 SEVERE OBESITY DUE TO EXCESS CALORIES WITH SERIOUS COMORBIDITY AND BODY MASS INDEX (BMI) OF 40.0 TO 44.9 IN ADULT (H): ICD-10-CM

## 2019-06-03 DIAGNOSIS — Z13.228 SCREENING FOR ENDOCRINE, NUTRITIONAL, METABOLIC AND IMMUNITY DISORDER: ICD-10-CM

## 2019-06-03 DIAGNOSIS — E66.01 CLASS 3 SEVERE OBESITY DUE TO EXCESS CALORIES WITH SERIOUS COMORBIDITY AND BODY MASS INDEX (BMI) OF 40.0 TO 44.9 IN ADULT (H): ICD-10-CM

## 2019-06-03 DIAGNOSIS — Z13.0 SCREENING FOR ENDOCRINE, NUTRITIONAL, METABOLIC AND IMMUNITY DISORDER: ICD-10-CM

## 2019-06-03 DIAGNOSIS — E88.810 METABOLIC SYNDROME X: ICD-10-CM

## 2019-06-03 DIAGNOSIS — Z13.29 SCREENING FOR THYROID DISORDER: ICD-10-CM

## 2019-06-03 DIAGNOSIS — Z13.0 SCREENING FOR DEFICIENCY ANEMIA: ICD-10-CM

## 2019-06-03 DIAGNOSIS — E55.9 AVITAMINOSIS D: ICD-10-CM

## 2019-06-03 DIAGNOSIS — Z12.31 VISIT FOR SCREENING MAMMOGRAM: ICD-10-CM

## 2019-06-03 LAB
ALBUMIN SERPL-MCNC: 3.5 G/DL (ref 3.5–5)
ALP SERPL-CCNC: 95 U/L (ref 45–120)
ALT SERPL W P-5'-P-CCNC: <9 U/L (ref 0–45)
ANION GAP SERPL CALCULATED.3IONS-SCNC: 9 MMOL/L (ref 5–18)
AST SERPL W P-5'-P-CCNC: 11 U/L (ref 0–40)
BILIRUB SERPL-MCNC: 0.2 MG/DL (ref 0–1)
BUN SERPL-MCNC: 8 MG/DL (ref 8–22)
CALCIUM SERPL-MCNC: 9.1 MG/DL (ref 8.5–10.5)
CHLORIDE BLD-SCNC: 107 MMOL/L (ref 98–107)
CHOLEST SERPL-MCNC: 225 MG/DL
CO2 SERPL-SCNC: 21 MMOL/L (ref 22–31)
CREAT SERPL-MCNC: 0.71 MG/DL (ref 0.6–1.1)
ERYTHROCYTE [DISTWIDTH] IN BLOOD BY AUTOMATED COUNT: 14.3 % (ref 11–14.5)
FASTING STATUS PATIENT QL REPORTED: YES
GFR SERPL CREATININE-BSD FRML MDRD: >60 ML/MIN/1.73M2
GLUCOSE BLD-MCNC: 107 MG/DL (ref 70–125)
HBA1C MFR BLD: 6.8 % (ref 3.5–6)
HCT VFR BLD AUTO: 43.9 % (ref 35–47)
HDLC SERPL-MCNC: 40 MG/DL
HGB BLD-MCNC: 14.4 G/DL (ref 12–16)
LDLC SERPL CALC-MCNC: 165 MG/DL
MCH RBC QN AUTO: 27.6 PG (ref 27–34)
MCHC RBC AUTO-ENTMCNC: 32.8 G/DL (ref 32–36)
MCV RBC AUTO: 84 FL (ref 80–100)
PLATELET # BLD AUTO: 250 THOU/UL (ref 140–440)
PMV BLD AUTO: 9.5 FL (ref 7–10)
POTASSIUM BLD-SCNC: 4.3 MMOL/L (ref 3.5–5)
PROT SERPL-MCNC: 6.4 G/DL (ref 6–8)
RBC # BLD AUTO: 5.22 MILL/UL (ref 3.8–5.4)
SODIUM SERPL-SCNC: 137 MMOL/L (ref 136–145)
TRIGL SERPL-MCNC: 98 MG/DL
TSH SERPL DL<=0.005 MIU/L-ACNC: 1.43 UIU/ML (ref 0.3–5)
WBC: 9 THOU/UL (ref 4–11)

## 2019-06-03 ASSESSMENT — MIFFLIN-ST. JEOR: SCORE: 1577.76

## 2019-06-03 NOTE — ASSESSMENT & PLAN NOTE
48 y.o. female in clinic today to discuss treatment of the following conditions through radical diet change, lifestyle modification and weight loss:  1. Class 3 severe obesity due to excess calories with serious comorbidity and body mass index (BMI) of 40.0 to 44.9 in adult (H)    2. Visit for screening mammogram    3. Weight gain    4. Benign essential hypertension    5. Prediabetes    6. Metabolic syndrome X    7. Screening for diabetes mellitus    8. Screening for thyroid disorder    9. Screening for deficiency anemia    10. Screening for endocrine, nutritional, metabolic and immunity disorder      There are multiple factors contributing to this patient's weight gain including metabolic syndrome (central obesity, diabetic range blood sugars, hypertension), untreated mental illness (PTSD with abuse history, anxiety?,  Recent alcoholism now in remission, OCD), night eating syndrome and binge eating disorder.  Her current strategy to cook healthy recipes that she finds online is very quickly undermined/overwhelmed by her cravings for processed/refined/sugar sweetened foods and beverages.  For now, we will focus on reducing her cravings with pharmacotherapy as she tries to put some structure to eating.  Encouraged her to eat foods that provide satiety including protein and fat.  - Resume phentermine but had a relatively lower dose.  50 mg capsules prescribed.  No contraindicationbased on her recent health history and previous tolerance.  -Resume topiramate at previous dose.  I recommended that she take this medication at dinnertime to provide coverage in the late evening and throughout the night.  - Trial of metformin.  Patient does not meet criteria for diabetes although her recent blood glucose levels have been in the diabeticrange.  Consider trial of liraglutide.  -Mental health intervention: [facility] program?  If the patient is struggling with binging will consider a formal referral for the [facility] program.  She also  is describing night eating syndrome with a history of OCD.  Consider trial of sertraline for night eating syndrome.  -Meet with bariatric dietitian in the near future.  - This patient may be an excellent candidate for the 24-week conference of weight management program.  The patient was not familiar with this as an option until our conversation today.  She will look into whether or not she wants to do this program and whether or not she has time to do this program.  -preventative health care.  Mammogram ordered given family history of breast cancer.  Patient is overdue for a Pap smear.  She will select a primary care physician over the next month or 2 and come in for a preventative health focused exam.This patient is a candidate for bariatric surgery (based on BMI or BMI + comorbidities).  This option was not discussed.    [place] for the follow-up appointment will include goals of therapy, definition of success and focus on physical activity/exercise plan.      Return to clinic in 4 weeks.

## 2019-06-04 LAB — 25(OH)D3 SERPL-MCNC: 7.7 NG/ML (ref 30–80)

## 2019-06-05 ENCOUNTER — COMMUNICATION - HEALTHEAST (OUTPATIENT)
Dept: FAMILY MEDICINE | Facility: CLINIC | Age: 49
End: 2019-06-05

## 2019-07-01 ENCOUNTER — OFFICE VISIT - HEALTHEAST (OUTPATIENT)
Dept: FAMILY MEDICINE | Facility: CLINIC | Age: 49
End: 2019-07-01

## 2019-07-01 DIAGNOSIS — E66.813 CLASS 3 SEVERE OBESITY DUE TO EXCESS CALORIES WITH SERIOUS COMORBIDITY AND BODY MASS INDEX (BMI) OF 40.0 TO 44.9 IN ADULT (H): ICD-10-CM

## 2019-07-01 DIAGNOSIS — R73.03 PREDIABETES: ICD-10-CM

## 2019-07-01 DIAGNOSIS — E66.01 OBESITY, CLASS III, BMI 40-49.9 (MORBID OBESITY) (H): ICD-10-CM

## 2019-07-01 DIAGNOSIS — I10 BENIGN ESSENTIAL HYPERTENSION: ICD-10-CM

## 2019-07-01 DIAGNOSIS — E88.810 METABOLIC SYNDROME X: ICD-10-CM

## 2019-07-01 DIAGNOSIS — Z71.3 NUTRITIONAL COUNSELING: ICD-10-CM

## 2019-07-01 DIAGNOSIS — E66.01 CLASS 3 SEVERE OBESITY DUE TO EXCESS CALORIES WITH SERIOUS COMORBIDITY AND BODY MASS INDEX (BMI) OF 40.0 TO 44.9 IN ADULT (H): ICD-10-CM

## 2019-07-01 ASSESSMENT — MIFFLIN-ST. JEOR: SCORE: 1564.15

## 2019-07-01 NOTE — ASSESSMENT & PLAN NOTE
48 y.o. year old female in clinic today to discuss treatment of the following conditions through diet and lifestyle modification and weight loss:  1. Class 3 severe obesity due to excess calories with serious comorbidity and body mass index (BMI) of 40.0 to 44.9 in adult (H)    2. Benign essential hypertension    3. Prediabetes    4. Metabolic syndrome X      The patient's weight loss result since the last visitwas successful based on weight loss and reduced sugar consumption.  She has not fully eliminated sugar.  - continue to build nutritional plan.  She has been reliant on poor food choices and small portions.  Limited protein and fat.     - reduce topiramate to 50 mg with dinner.    - continue phentermine at 15 mg   - discussed nutrition.  Add protein and fat.  Reduced dose of topiramate to gain more appetite.    Follow-up: 1 months

## 2019-07-10 ENCOUNTER — RECORDS - HEALTHEAST (OUTPATIENT)
Dept: ADMINISTRATIVE | Facility: OTHER | Age: 49
End: 2019-07-10

## 2019-07-29 ENCOUNTER — HOSPITAL ENCOUNTER (OUTPATIENT)
Dept: MAMMOGRAPHY | Facility: CLINIC | Age: 49
Discharge: HOME | End: 2019-07-29
Attending: FAMILY MEDICINE

## 2019-07-29 DIAGNOSIS — Z12.31 VISIT FOR SCREENING MAMMOGRAM: ICD-10-CM

## 2019-08-01 ENCOUNTER — COMMUNICATION - HEALTHEAST (OUTPATIENT)
Dept: FAMILY MEDICINE | Facility: CLINIC | Age: 49
End: 2019-08-01

## 2019-08-01 DIAGNOSIS — E66.813 CLASS 3 SEVERE OBESITY DUE TO EXCESS CALORIES WITH SERIOUS COMORBIDITY AND BODY MASS INDEX (BMI) OF 40.0 TO 44.9 IN ADULT (H): ICD-10-CM

## 2019-08-01 DIAGNOSIS — E88.810 METABOLIC SYNDROME X: ICD-10-CM

## 2019-08-01 DIAGNOSIS — I10 BENIGN ESSENTIAL HYPERTENSION: ICD-10-CM

## 2019-08-01 DIAGNOSIS — R73.03 PREDIABETES: ICD-10-CM

## 2019-08-01 DIAGNOSIS — E55.9 AVITAMINOSIS D: ICD-10-CM

## 2019-08-01 DIAGNOSIS — E66.01 CLASS 3 SEVERE OBESITY DUE TO EXCESS CALORIES WITH SERIOUS COMORBIDITY AND BODY MASS INDEX (BMI) OF 40.0 TO 44.9 IN ADULT (H): ICD-10-CM

## 2019-08-09 ENCOUNTER — OFFICE VISIT - HEALTHEAST (OUTPATIENT)
Dept: FAMILY MEDICINE | Facility: CLINIC | Age: 49
End: 2019-08-09

## 2019-08-09 DIAGNOSIS — J11.1 INFLUENZA-LIKE ILLNESS: ICD-10-CM

## 2019-08-09 LAB — DEPRECATED S PYO AG THROAT QL EIA: NORMAL

## 2019-08-10 LAB — GROUP A STREP BY PCR: NORMAL

## 2019-08-12 ENCOUNTER — OFFICE VISIT - HEALTHEAST (OUTPATIENT)
Dept: FAMILY MEDICINE | Facility: CLINIC | Age: 49
End: 2019-08-12

## 2019-08-12 DIAGNOSIS — E66.812 CLASS 2 SEVERE OBESITY DUE TO EXCESS CALORIES WITH SERIOUS COMORBIDITY AND BODY MASS INDEX (BMI) OF 39.0 TO 39.9 IN ADULT (H): ICD-10-CM

## 2019-08-12 DIAGNOSIS — E88.810 METABOLIC SYNDROME X: ICD-10-CM

## 2019-08-12 DIAGNOSIS — E66.01 CLASS 2 SEVERE OBESITY DUE TO EXCESS CALORIES WITH SERIOUS COMORBIDITY AND BODY MASS INDEX (BMI) OF 39.0 TO 39.9 IN ADULT (H): ICD-10-CM

## 2019-08-12 DIAGNOSIS — E66.01 CLASS 3 SEVERE OBESITY DUE TO EXCESS CALORIES WITH SERIOUS COMORBIDITY AND BODY MASS INDEX (BMI) OF 40.0 TO 44.9 IN ADULT (H): ICD-10-CM

## 2019-08-12 DIAGNOSIS — E66.813 CLASS 3 SEVERE OBESITY DUE TO EXCESS CALORIES WITH SERIOUS COMORBIDITY AND BODY MASS INDEX (BMI) OF 40.0 TO 44.9 IN ADULT (H): ICD-10-CM

## 2019-08-12 DIAGNOSIS — R73.03 PREDIABETES: ICD-10-CM

## 2019-08-12 DIAGNOSIS — I10 BENIGN ESSENTIAL HYPERTENSION: ICD-10-CM

## 2019-08-12 NOTE — ASSESSMENT & PLAN NOTE
48 y.o. year old female in clinic today to discuss treatment of the following conditions through diet and lifestyle modification and weight loss:  1. Class 2 severe obesity due to excess calories with serious comorbidity and body mass index (BMI) of 39.0 to 39.9 in adult (H)    2. Benign essential hypertension    3. Prediabetes    4. Metabolic syndrome X    5. Class 3 severe obesity due to excess calories with serious comorbidity and body mass index (BMI) of 40.0 to 44.9 in adult (H)      The patient's weight loss result since the last visit was successful based on weight loss. She is struggling to completely eliminate SSBs and requests an upward dose adjustment of phentermine.  She describes adequate nutrition and incremental improvement in food preparation and macronutritient breakdown.   - increase phentermine to 30 mg.  We discussed that this should correspond to an effort to quit soda.  She will focus on getting adequate nutrition at lunch time   - continue topiramate.   - discussed pregnancy category of above combination.  She is not sexually active and does not plan to resume sexual activity.  She is aware that I would recommend hormonal contraception if this becomes a possibility.   - return to clinic in 4 weeks.

## 2019-09-19 ENCOUNTER — COMMUNICATION - HEALTHEAST (OUTPATIENT)
Dept: FAMILY MEDICINE | Facility: CLINIC | Age: 49
End: 2019-09-19

## 2019-09-19 DIAGNOSIS — E66.813 CLASS 3 SEVERE OBESITY DUE TO EXCESS CALORIES WITH SERIOUS COMORBIDITY AND BODY MASS INDEX (BMI) OF 40.0 TO 44.9 IN ADULT (H): ICD-10-CM

## 2019-09-19 DIAGNOSIS — E66.01 CLASS 3 SEVERE OBESITY DUE TO EXCESS CALORIES WITH SERIOUS COMORBIDITY AND BODY MASS INDEX (BMI) OF 40.0 TO 44.9 IN ADULT (H): ICD-10-CM

## 2019-09-19 DIAGNOSIS — E55.9 AVITAMINOSIS D: ICD-10-CM

## 2019-09-19 DIAGNOSIS — R73.03 PREDIABETES: ICD-10-CM

## 2019-09-19 DIAGNOSIS — I10 BENIGN ESSENTIAL HYPERTENSION: ICD-10-CM

## 2019-09-19 DIAGNOSIS — E88.810 METABOLIC SYNDROME X: ICD-10-CM

## 2019-11-05 ENCOUNTER — HEALTH MAINTENANCE LETTER (OUTPATIENT)
Age: 49
End: 2019-11-05

## 2019-11-06 ENCOUNTER — COMMUNICATION - HEALTHEAST (OUTPATIENT)
Dept: FAMILY MEDICINE | Facility: CLINIC | Age: 49
End: 2019-11-06

## 2019-11-06 DIAGNOSIS — E66.813 CLASS 3 SEVERE OBESITY DUE TO EXCESS CALORIES WITH SERIOUS COMORBIDITY AND BODY MASS INDEX (BMI) OF 40.0 TO 44.9 IN ADULT (H): ICD-10-CM

## 2019-11-06 DIAGNOSIS — E66.01 CLASS 3 SEVERE OBESITY DUE TO EXCESS CALORIES WITH SERIOUS COMORBIDITY AND BODY MASS INDEX (BMI) OF 40.0 TO 44.9 IN ADULT (H): ICD-10-CM

## 2019-11-06 DIAGNOSIS — R73.03 PREDIABETES: ICD-10-CM

## 2019-11-06 DIAGNOSIS — E88.810 METABOLIC SYNDROME X: ICD-10-CM

## 2019-11-06 DIAGNOSIS — E66.812 CLASS 2 SEVERE OBESITY DUE TO EXCESS CALORIES WITH SERIOUS COMORBIDITY AND BODY MASS INDEX (BMI) OF 39.0 TO 39.9 IN ADULT (H): ICD-10-CM

## 2019-11-06 DIAGNOSIS — E66.01 CLASS 2 SEVERE OBESITY DUE TO EXCESS CALORIES WITH SERIOUS COMORBIDITY AND BODY MASS INDEX (BMI) OF 39.0 TO 39.9 IN ADULT (H): ICD-10-CM

## 2019-11-06 DIAGNOSIS — I10 BENIGN ESSENTIAL HYPERTENSION: ICD-10-CM

## 2019-11-06 NOTE — ASSESSMENT & PLAN NOTE
This patient has not been seen in follow-up since increasing her dose of phentermine from 15 mg to 30 mg.  She was to have come to clinic in September.  Unclear follow-up plan.  She should be scheduled with stated intent to follow-up in clinic before additional refills.

## 2019-11-07 ENCOUNTER — COMMUNICATION - HEALTHEAST (OUTPATIENT)
Dept: FAMILY MEDICINE | Facility: CLINIC | Age: 49
End: 2019-11-07

## 2020-05-21 ENCOUNTER — COMMUNICATION - HEALTHEAST (OUTPATIENT)
Dept: FAMILY MEDICINE | Facility: CLINIC | Age: 50
End: 2020-05-21

## 2020-06-01 ENCOUNTER — OFFICE VISIT - HEALTHEAST (OUTPATIENT)
Dept: FAMILY MEDICINE | Facility: CLINIC | Age: 50
End: 2020-06-01

## 2020-06-01 ENCOUNTER — COMMUNICATION - HEALTHEAST (OUTPATIENT)
Dept: FAMILY MEDICINE | Facility: CLINIC | Age: 50
End: 2020-06-01

## 2020-06-01 DIAGNOSIS — R73.03 PREDIABETES: ICD-10-CM

## 2020-06-01 DIAGNOSIS — E66.01 CLASS 3 SEVERE OBESITY DUE TO EXCESS CALORIES WITH SERIOUS COMORBIDITY AND BODY MASS INDEX (BMI) OF 40.0 TO 44.9 IN ADULT (H): ICD-10-CM

## 2020-06-01 DIAGNOSIS — E88.810 METABOLIC SYNDROME X: ICD-10-CM

## 2020-06-01 DIAGNOSIS — I10 BENIGN ESSENTIAL HYPERTENSION: ICD-10-CM

## 2020-06-01 DIAGNOSIS — R63.2 BINGE EATING: ICD-10-CM

## 2020-06-01 DIAGNOSIS — E55.9 AVITAMINOSIS D: ICD-10-CM

## 2020-06-01 DIAGNOSIS — E66.813 CLASS 3 SEVERE OBESITY DUE TO EXCESS CALORIES WITH SERIOUS COMORBIDITY AND BODY MASS INDEX (BMI) OF 40.0 TO 44.9 IN ADULT (H): ICD-10-CM

## 2020-06-01 NOTE — ASSESSMENT & PLAN NOTE
"49 y.o. year old female in clinic today to discuss treatment of the following conditions through diet and lifestyle modification and weight loss:  1. Class 3 severe obesity due to excess calories with serious comorbidity and body mass index (BMI) of 40.0 to 44.9 in adult (H)    2. Benign essential hypertension    3. Prediabetes    4. Metabolic syndrome X    5. Avitaminosis D    6. Binge eating      The patient's weight loss result since the last visit was unsuccessful based on weight gain.   Towards the end of last summer, she lost momentum and started eating starchy carbohydrates and convenience foods again.  She describes it as \"lazy\" but I believe it is actually result of metabolic syndrome and hyperinsulinemia.  She was unsuccessful at completely eliminating sugar sweetened beverages from her diet.  There is also a strong drive to binge as a comorbid condition which is making this more challenging.  She is very interested in a ketogenic style of eating.  We spent much of our visit talking about hyperinsulinemia as 1 of the key factors in her struggles and even in her decision making process.  Her and her sister will implement a ketogenic style of eating.  I believe this will be cost neutral she is currently eating most of her food out from fast food restaurants.  To help facilitate this we will employ phentermine and topiramate.  Previously, she tolerated these medications without difficulty.  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  She also will have fasting lab work.  If she does test positive for diabetes we will proceed with the dietary intervention and get metformin going again.  We will consider liraglutide at that point as well.  We discussed the possibility of employing a fasting mimicry diet 7/1-7/5.  We will discuss during her next clinic visit which will likely be scheduled 6/28 or 6/29.  "

## 2020-06-02 ENCOUNTER — AMBULATORY - HEALTHEAST (OUTPATIENT)
Dept: LAB | Facility: CLINIC | Age: 50
End: 2020-06-02

## 2020-06-02 DIAGNOSIS — I10 BENIGN ESSENTIAL HYPERTENSION: ICD-10-CM

## 2020-06-02 DIAGNOSIS — R73.03 PREDIABETES: ICD-10-CM

## 2020-06-02 DIAGNOSIS — E55.9 AVITAMINOSIS D: ICD-10-CM

## 2020-06-02 DIAGNOSIS — E66.813 CLASS 3 SEVERE OBESITY DUE TO EXCESS CALORIES WITH SERIOUS COMORBIDITY AND BODY MASS INDEX (BMI) OF 40.0 TO 44.9 IN ADULT (H): ICD-10-CM

## 2020-06-02 DIAGNOSIS — E66.01 CLASS 3 SEVERE OBESITY DUE TO EXCESS CALORIES WITH SERIOUS COMORBIDITY AND BODY MASS INDEX (BMI) OF 40.0 TO 44.9 IN ADULT (H): ICD-10-CM

## 2020-06-02 DIAGNOSIS — E88.810 METABOLIC SYNDROME X: ICD-10-CM

## 2020-06-02 LAB
ALT SERPL W P-5'-P-CCNC: 9 U/L (ref 0–45)
ANION GAP SERPL CALCULATED.3IONS-SCNC: 8 MMOL/L (ref 5–18)
BUN SERPL-MCNC: 6 MG/DL (ref 8–22)
CALCIUM SERPL-MCNC: 9.2 MG/DL (ref 8.5–10.5)
CHLORIDE BLD-SCNC: 104 MMOL/L (ref 98–107)
CHOLEST SERPL-MCNC: 242 MG/DL
CO2 SERPL-SCNC: 26 MMOL/L (ref 22–31)
CREAT SERPL-MCNC: 0.78 MG/DL (ref 0.6–1.1)
FASTING STATUS PATIENT QL REPORTED: YES
GFR SERPL CREATININE-BSD FRML MDRD: >60 ML/MIN/1.73M2
GLUCOSE BLD-MCNC: 138 MG/DL (ref 70–125)
HBA1C MFR BLD: 6.5 % (ref 3.5–6)
HDLC SERPL-MCNC: 44 MG/DL
LDLC SERPL CALC-MCNC: 162 MG/DL
POTASSIUM BLD-SCNC: 4.5 MMOL/L (ref 3.5–5)
SODIUM SERPL-SCNC: 138 MMOL/L (ref 136–145)
TRIGL SERPL-MCNC: 179 MG/DL

## 2020-06-05 LAB
25(OH)D3 SERPL-MCNC: 10.3 NG/ML (ref 30–80)
25(OH)D3 SERPL-MCNC: 10.3 NG/ML (ref 30–80)

## 2020-06-23 ENCOUNTER — COMMUNICATION - HEALTHEAST (OUTPATIENT)
Dept: FAMILY MEDICINE | Facility: CLINIC | Age: 50
End: 2020-06-23

## 2020-06-29 ENCOUNTER — COMMUNICATION - HEALTHEAST (OUTPATIENT)
Dept: FAMILY MEDICINE | Facility: CLINIC | Age: 50
End: 2020-06-29

## 2020-06-29 ENCOUNTER — OFFICE VISIT - HEALTHEAST (OUTPATIENT)
Dept: FAMILY MEDICINE | Facility: CLINIC | Age: 50
End: 2020-06-29

## 2020-06-29 DIAGNOSIS — I10 BENIGN ESSENTIAL HYPERTENSION: ICD-10-CM

## 2020-06-29 DIAGNOSIS — R73.03 PREDIABETES: ICD-10-CM

## 2020-06-29 DIAGNOSIS — E55.9 AVITAMINOSIS D: ICD-10-CM

## 2020-06-29 DIAGNOSIS — E66.813 CLASS 3 SEVERE OBESITY DUE TO EXCESS CALORIES WITH SERIOUS COMORBIDITY AND BODY MASS INDEX (BMI) OF 40.0 TO 44.9 IN ADULT (H): ICD-10-CM

## 2020-06-29 DIAGNOSIS — E88.810 METABOLIC SYNDROME X: ICD-10-CM

## 2020-06-29 DIAGNOSIS — E66.01 CLASS 3 SEVERE OBESITY DUE TO EXCESS CALORIES WITH SERIOUS COMORBIDITY AND BODY MASS INDEX (BMI) OF 40.0 TO 44.9 IN ADULT (H): ICD-10-CM

## 2020-06-29 DIAGNOSIS — R63.2 BINGE EATING: ICD-10-CM

## 2020-06-29 NOTE — ASSESSMENT & PLAN NOTE
49 y.o. year old female in clinic today to discuss treatment of the following conditions through diet and lifestyle modification and weight loss:  1. Class 3 severe obesity due to excess calories with serious comorbidity and body mass index (BMI) of 40.0 to 44.9 in adult (H)    2. Metabolic syndrome X    3. Prediabetes    4. Benign essential hypertension    5. Binge eating    6. Avitaminosis D      The patient's weight loss result since the last visit was successful basedon dietary improvement and elimination of sugar/SSBs.  She has completely changed her dietary approach and lost about 11 lbs down.     - continue metformin.  Continue phentermine/topiramate   - continue current eating strategies   - ketogenic style eating.     Follow-up: 1 months

## 2020-08-03 ENCOUNTER — OFFICE VISIT - HEALTHEAST (OUTPATIENT)
Dept: FAMILY MEDICINE | Facility: CLINIC | Age: 50
End: 2020-08-03

## 2020-08-03 DIAGNOSIS — E66.01 CLASS 3 SEVERE OBESITY DUE TO EXCESS CALORIES WITH SERIOUS COMORBIDITY AND BODY MASS INDEX (BMI) OF 40.0 TO 44.9 IN ADULT (H): ICD-10-CM

## 2020-08-03 DIAGNOSIS — R73.03 PREDIABETES: ICD-10-CM

## 2020-08-03 DIAGNOSIS — E66.813 CLASS 3 SEVERE OBESITY DUE TO EXCESS CALORIES WITH SERIOUS COMORBIDITY AND BODY MASS INDEX (BMI) OF 40.0 TO 44.9 IN ADULT (H): ICD-10-CM

## 2020-08-03 DIAGNOSIS — I10 BENIGN ESSENTIAL HYPERTENSION: ICD-10-CM

## 2020-08-03 DIAGNOSIS — Z12.31 VISIT FOR SCREENING MAMMOGRAM: ICD-10-CM

## 2020-08-03 DIAGNOSIS — E88.810 METABOLIC SYNDROME X: ICD-10-CM

## 2020-08-03 NOTE — ASSESSMENT & PLAN NOTE
49 y.o. year old female in clinic today to discuss treatment of the following conditions through diet and lifestyle modification and weight loss:  1. Class 3 severe obesity due to excess calories with serious comorbidity and body mass index (BMI) of 40.0 to 44.9 in adult (H)    2. Benign essential hypertension    3. Prediabetes    4. Metabolic syndrome X    5. Screening for breast cancer      The patient's weight loss result since the last visit was successful based on weight loss.  She continues to eat and drink well.  Energy has been high.    -continue metformin,  -continue phentermine/topiramate.  and  -Reviewed nutritional goals and physical activity.  Continue to evaluate how much the medications help.      Follow-up: 2 months

## 2020-08-26 ENCOUNTER — COMMUNICATION - HEALTHEAST (OUTPATIENT)
Dept: FAMILY MEDICINE | Facility: CLINIC | Age: 50
End: 2020-08-26

## 2020-09-14 ENCOUNTER — OFFICE VISIT - HEALTHEAST (OUTPATIENT)
Dept: FAMILY MEDICINE | Facility: CLINIC | Age: 50
End: 2020-09-14

## 2020-09-14 DIAGNOSIS — E66.813 CLASS 3 SEVERE OBESITY DUE TO EXCESS CALORIES WITH SERIOUS COMORBIDITY AND BODY MASS INDEX (BMI) OF 40.0 TO 44.9 IN ADULT (H): ICD-10-CM

## 2020-09-14 DIAGNOSIS — R73.03 PREDIABETES: ICD-10-CM

## 2020-09-14 DIAGNOSIS — I10 BENIGN ESSENTIAL HYPERTENSION: ICD-10-CM

## 2020-09-14 DIAGNOSIS — E66.01 CLASS 3 SEVERE OBESITY DUE TO EXCESS CALORIES WITH SERIOUS COMORBIDITY AND BODY MASS INDEX (BMI) OF 40.0 TO 44.9 IN ADULT (H): ICD-10-CM

## 2020-09-14 DIAGNOSIS — E88.810 METABOLIC SYNDROME X: ICD-10-CM

## 2020-09-14 NOTE — ASSESSMENT & PLAN NOTE
49 y.o. year old female in clinic today to discuss treatment of the following conditions through diet and lifestyle modification and weight loss:  1. Class 3 severe obesity due to excess calories with serious comorbidity and body mass index (BMI) of 40.0 to 44.9 in adult (H)    2. Benign essential hypertension    3. Prediabetes    4. Metabolic syndrome X      The patient's weight loss result since the last visit was successful based on ongoing weight loss.  She is adding variety (more veggies).  She is experimenting with fasting.  Family members eat pizza and pasta in front of her which drives some cravings.     - continue metformin   - continue phentermine/topiramate    - discussed sleep: decrease phentermine to 15 mg to see if it helps sleep.

## 2020-10-01 ENCOUNTER — HOSPITAL ENCOUNTER (OUTPATIENT)
Dept: MAMMOGRAPHY | Facility: CLINIC | Age: 50
Discharge: HOME | End: 2020-10-01
Attending: FAMILY MEDICINE

## 2020-10-01 DIAGNOSIS — Z12.31 VISIT FOR SCREENING MAMMOGRAM: ICD-10-CM

## 2020-10-12 ENCOUNTER — COMMUNICATION - HEALTHEAST (OUTPATIENT)
Dept: FAMILY MEDICINE | Facility: CLINIC | Age: 50
End: 2020-10-12

## 2020-10-19 ENCOUNTER — OFFICE VISIT - HEALTHEAST (OUTPATIENT)
Dept: FAMILY MEDICINE | Facility: CLINIC | Age: 50
End: 2020-10-19

## 2020-10-19 DIAGNOSIS — R73.03 PREDIABETES: ICD-10-CM

## 2020-10-19 DIAGNOSIS — R63.2 BINGE EATING: ICD-10-CM

## 2020-10-19 DIAGNOSIS — E88.810 METABOLIC SYNDROME X: ICD-10-CM

## 2020-10-19 DIAGNOSIS — E66.813 CLASS 3 SEVERE OBESITY DUE TO EXCESS CALORIES WITH SERIOUS COMORBIDITY AND BODY MASS INDEX (BMI) OF 40.0 TO 44.9 IN ADULT (H): ICD-10-CM

## 2020-10-19 DIAGNOSIS — I10 BENIGN ESSENTIAL HYPERTENSION: ICD-10-CM

## 2020-10-19 DIAGNOSIS — E66.01 CLASS 3 SEVERE OBESITY DUE TO EXCESS CALORIES WITH SERIOUS COMORBIDITY AND BODY MASS INDEX (BMI) OF 40.0 TO 44.9 IN ADULT (H): ICD-10-CM

## 2020-10-19 NOTE — ASSESSMENT & PLAN NOTE
49 y.o. year old female in clinic today to discuss treatment of the following conditions through diet and lifestyle modification and weight loss:  1. Class 3 severe obesity due to excess calories with serious comorbidity and body mass index (BMI) of 40.0 to 44.9 in adult (H)    2. Benign essential hypertension    3. Prediabetes    4. Metabolic syndrome X    5. Binge eating      The patient's weight loss result since the last visit was successful based on ongoing weight loss.  She continues to execute her plan.  Some increase of cravings (work and with family members).  The patient's mom has continued to be supportive.     - continue phentermine/topiramate   - social struggles (family members that are eating foods that she craves in front of her)   -  Skin concerns: no infections.  Some discomfort with movement.

## 2020-11-22 ENCOUNTER — HEALTH MAINTENANCE LETTER (OUTPATIENT)
Age: 50
End: 2020-11-22

## 2020-11-29 ENCOUNTER — NURSE TRIAGE (OUTPATIENT)
Dept: NURSING | Facility: CLINIC | Age: 50
End: 2020-11-29

## 2020-11-29 NOTE — TELEPHONE ENCOUNTER
Covid exposure - works at [employer] and have had several employees test positive.   Symptoms started Thursday night - cough, shortness of breath, diarrhea, headaches, hot/cold chills. Yesterday noticed a rash under her arms and on the side of her neck and down both sides. Patient is a [employer] employee. Did [email or web address] already. Advised patient that she wait for oncare evaluation to be completed (she just initiated the visit 30 mins ago). If that is unsuccessful, referred her to the e-visit feature of [facility] per the [facility] recommendations.    Shirin Cosby RN on 11/29/2020 at 4:14 PM    Reason for Disposition    MILD difficulty breathing (e.g., minimal/no SOB at rest, SOB with walking, pulse <100)    Additional Information    Negative: SEVERE difficulty breathing (e.g., struggling for each breath, speaks in single words)    Negative: Difficult to awaken or acting confused (e.g., disoriented, slurred speech)    Negative: Bluish (or gray) lips or face now    Negative: Shock suspected (e.g., cold/pale/clammy skin, too weak to stand, low BP, rapid pulse)    Negative: Sounds like a life-threatening emergency to the triager    Negative: [1] COVID-19 exposure AND [2] no symptoms    Negative: COVID-19 and Breastfeeding, questions about    Negative: [1] Adult with possible COVID-19 symptoms AND [2] triager concerned about severity of symptoms or other causes    Negative: SEVERE or constant chest pain or pressure (Exception: mild central chest pain, present only when coughing)    Negative: MODERATE difficulty breathing (e.g., speaks in phrases, SOB even at rest, pulse 100-120)    Negative: Patient sounds very sick or weak to the triager    Protocols used: CORONAVIRUS (COVID-19) DIAGNOSED OR EAKAECMEL-R-DO 8.4.20

## 2020-11-30 ENCOUNTER — AMBULATORY - HEALTHEAST (OUTPATIENT)
Dept: FAMILY MEDICINE | Facility: CLINIC | Age: 50
End: 2020-11-30

## 2020-11-30 ENCOUNTER — OFFICE VISIT - HEALTHEAST (OUTPATIENT)
Dept: FAMILY MEDICINE | Facility: CLINIC | Age: 50
End: 2020-11-30

## 2020-11-30 ENCOUNTER — COMMUNICATION - HEALTHEAST (OUTPATIENT)
Dept: FAMILY MEDICINE | Facility: CLINIC | Age: 50
End: 2020-11-30

## 2020-11-30 DIAGNOSIS — Z20.822 SUSPECTED COVID-19 VIRUS INFECTION: ICD-10-CM

## 2020-11-30 DIAGNOSIS — E66.01 CLASS 3 SEVERE OBESITY DUE TO EXCESS CALORIES WITH SERIOUS COMORBIDITY AND BODY MASS INDEX (BMI) OF 40.0 TO 44.9 IN ADULT (H): ICD-10-CM

## 2020-11-30 DIAGNOSIS — I10 BENIGN ESSENTIAL HYPERTENSION: ICD-10-CM

## 2020-11-30 DIAGNOSIS — E66.813 CLASS 3 SEVERE OBESITY DUE TO EXCESS CALORIES WITH SERIOUS COMORBIDITY AND BODY MASS INDEX (BMI) OF 40.0 TO 44.9 IN ADULT (H): ICD-10-CM

## 2020-11-30 DIAGNOSIS — R73.03 PREDIABETES: ICD-10-CM

## 2020-11-30 DIAGNOSIS — R63.2 BINGE EATING: ICD-10-CM

## 2020-11-30 DIAGNOSIS — E55.9 AVITAMINOSIS D: ICD-10-CM

## 2020-11-30 DIAGNOSIS — E88.810 METABOLIC SYNDROME X: ICD-10-CM

## 2020-11-30 NOTE — ASSESSMENT & PLAN NOTE
50 y.o. year old female in clinic today to discuss treatment of the following conditions through diet and lifestyle modification and weight loss:  1. Class 3 severe obesity due to excess calories with serious comorbidity and body mass index (BMI) of 40.0 to 44.9 in adult (H)    2. Suspected COVID-19 virus infection    3. Benign essential hypertension    4. Prediabetes    5. Metabolic syndrome X    6. Binge eating    7. Avitaminosis D      The patient's weight loss result since the last visit was mixed based onweight stability.  She had the experience of feeling ill when she ate pasta over the past month.  Weight is stable despite likely COVID infection.  We discussed the benefit of decrease rate of complications with COVID-19 as a result of losing weight over the summer.   -continue metformin,  -continue phentermine/topiramate.  and  -Reviewed nutritional goals and physical activity.  - f/u in ~5 weeks.

## 2020-12-02 ENCOUNTER — COMMUNICATION - HEALTHEAST (OUTPATIENT)
Dept: SCHEDULING | Facility: CLINIC | Age: 50
End: 2020-12-02

## 2021-01-21 ENCOUNTER — COMMUNICATION - HEALTHEAST (OUTPATIENT)
Dept: FAMILY MEDICINE | Facility: CLINIC | Age: 51
End: 2021-01-21

## 2021-01-21 DIAGNOSIS — R73.03 PREDIABETES: ICD-10-CM

## 2021-02-11 ENCOUNTER — COMMUNICATION - HEALTHEAST (OUTPATIENT)
Dept: FAMILY MEDICINE | Facility: CLINIC | Age: 51
End: 2021-02-11

## 2021-02-11 DIAGNOSIS — E66.01 CLASS 3 SEVERE OBESITY DUE TO EXCESS CALORIES WITH SERIOUS COMORBIDITY AND BODY MASS INDEX (BMI) OF 40.0 TO 44.9 IN ADULT (H): ICD-10-CM

## 2021-02-11 DIAGNOSIS — R73.03 PREDIABETES: ICD-10-CM

## 2021-02-11 DIAGNOSIS — E88.810 METABOLIC SYNDROME X: ICD-10-CM

## 2021-02-11 DIAGNOSIS — E66.813 CLASS 3 SEVERE OBESITY DUE TO EXCESS CALORIES WITH SERIOUS COMORBIDITY AND BODY MASS INDEX (BMI) OF 40.0 TO 44.9 IN ADULT (H): ICD-10-CM

## 2021-02-11 DIAGNOSIS — I10 BENIGN ESSENTIAL HYPERTENSION: ICD-10-CM

## 2021-02-13 ENCOUNTER — HEALTH MAINTENANCE LETTER (OUTPATIENT)
Age: 51
End: 2021-02-13

## 2021-02-19 ENCOUNTER — OFFICE VISIT - HEALTHEAST (OUTPATIENT)
Dept: FAMILY MEDICINE | Facility: CLINIC | Age: 51
End: 2021-02-19

## 2021-02-19 DIAGNOSIS — E66.813 CLASS 3 SEVERE OBESITY DUE TO EXCESS CALORIES WITH SERIOUS COMORBIDITY AND BODY MASS INDEX (BMI) OF 40.0 TO 44.9 IN ADULT (H): ICD-10-CM

## 2021-02-19 DIAGNOSIS — E88.810 METABOLIC SYNDROME X: ICD-10-CM

## 2021-02-19 DIAGNOSIS — E66.01 CLASS 3 SEVERE OBESITY DUE TO EXCESS CALORIES WITH SERIOUS COMORBIDITY AND BODY MASS INDEX (BMI) OF 40.0 TO 44.9 IN ADULT (H): ICD-10-CM

## 2021-02-19 DIAGNOSIS — I10 BENIGN ESSENTIAL HYPERTENSION: ICD-10-CM

## 2021-02-19 DIAGNOSIS — E55.9 AVITAMINOSIS D: ICD-10-CM

## 2021-02-19 DIAGNOSIS — R63.2 BINGE EATING: ICD-10-CM

## 2021-02-19 DIAGNOSIS — R73.03 PREDIABETES: ICD-10-CM

## 2021-02-19 NOTE — ASSESSMENT & PLAN NOTE
50 y.o. year old female in clinic today to discuss treatment of the following conditions through diet and lifestyle modification and weight loss:  1. Class 3 severe obesity due to excess calories with serious comorbidity and body mass index (BMI) of 40.0 to 44.9 in adult (H)    2. Benign essential hypertension    3. Binge eating    4. Metabolic syndrome X    5. Avitaminosis D    6. Prediabetes      The patient's weight loss result since the last visit was mixed based on weight stability.  She was off plan for a while since I last saw her but is doing better over the past few days.  Weight olya.  She has a niece that is supporting and encouraging her to exercise.     - unclear benefit from medications.  She fell off her meal plan without them recently.  For now, continue to take phentermine/topirmate.   - I anticipate she will be back on plan recently given her recent recognition of where she was deficient and how she has improved.     Follow-up: 6 weeks

## 2021-04-15 ENCOUNTER — OFFICE VISIT - HEALTHEAST (OUTPATIENT)
Dept: FAMILY MEDICINE | Facility: CLINIC | Age: 51
End: 2021-04-15

## 2021-04-15 DIAGNOSIS — R05.9 COUGH: ICD-10-CM

## 2021-04-15 DIAGNOSIS — R07.0 THROAT PAIN: ICD-10-CM

## 2021-04-15 DIAGNOSIS — R09.81 NASAL CONGESTION: ICD-10-CM

## 2021-04-15 DIAGNOSIS — J18.9 PNEUMONIA OF LEFT LOWER LOBE DUE TO INFECTIOUS ORGANISM: ICD-10-CM

## 2021-04-15 LAB
DEPRECATED S PYO AG THROAT QL EIA: NORMAL
FLUAV AG SPEC QL IA: NORMAL
FLUBV AG SPEC QL IA: NORMAL
GROUP A STREP BY PCR: NORMAL

## 2021-04-16 LAB
SARS-COV-2 PCR COMMENT: NORMAL
SARS-COV-2 RNA SPEC QL NAA+PROBE: NEGATIVE
SARS-COV-2 VIRUS SPECIMEN SOURCE: NORMAL

## 2021-04-17 ENCOUNTER — COMMUNICATION - HEALTHEAST (OUTPATIENT)
Dept: SCHEDULING | Facility: CLINIC | Age: 51
End: 2021-04-17

## 2021-04-19 ENCOUNTER — COMMUNICATION - HEALTHEAST (OUTPATIENT)
Dept: FAMILY MEDICINE | Facility: CLINIC | Age: 51
End: 2021-04-19

## 2021-04-19 DIAGNOSIS — E66.813 CLASS 3 SEVERE OBESITY DUE TO EXCESS CALORIES WITH SERIOUS COMORBIDITY AND BODY MASS INDEX (BMI) OF 40.0 TO 44.9 IN ADULT (H): ICD-10-CM

## 2021-04-19 DIAGNOSIS — E88.810 METABOLIC SYNDROME X: ICD-10-CM

## 2021-04-19 DIAGNOSIS — E66.01 CLASS 3 SEVERE OBESITY DUE TO EXCESS CALORIES WITH SERIOUS COMORBIDITY AND BODY MASS INDEX (BMI) OF 40.0 TO 44.9 IN ADULT (H): ICD-10-CM

## 2021-04-19 DIAGNOSIS — R73.03 PREDIABETES: ICD-10-CM

## 2021-04-19 DIAGNOSIS — I10 BENIGN ESSENTIAL HYPERTENSION: ICD-10-CM

## 2021-05-03 ENCOUNTER — OFFICE VISIT - HEALTHEAST (OUTPATIENT)
Dept: FAMILY MEDICINE | Facility: CLINIC | Age: 51
End: 2021-05-03

## 2021-05-03 DIAGNOSIS — R73.03 PREDIABETES: ICD-10-CM

## 2021-05-03 DIAGNOSIS — I10 BENIGN ESSENTIAL HYPERTENSION: ICD-10-CM

## 2021-05-03 DIAGNOSIS — E66.09 CLASS 1 OBESITY DUE TO EXCESS CALORIES WITH SERIOUS COMORBIDITY AND BODY MASS INDEX (BMI) OF 34.0 TO 34.9 IN ADULT: ICD-10-CM

## 2021-05-03 DIAGNOSIS — E66.811 CLASS 1 OBESITY DUE TO EXCESS CALORIES WITH SERIOUS COMORBIDITY AND BODY MASS INDEX (BMI) OF 34.0 TO 34.9 IN ADULT: ICD-10-CM

## 2021-05-03 DIAGNOSIS — E88.810 METABOLIC SYNDROME X: ICD-10-CM

## 2021-05-03 DIAGNOSIS — E55.9 AVITAMINOSIS D: ICD-10-CM

## 2021-05-03 LAB
ALT SERPL W P-5'-P-CCNC: 12 U/L (ref 0–45)
ANION GAP SERPL CALCULATED.3IONS-SCNC: 11 MMOL/L (ref 5–18)
BUN SERPL-MCNC: 8 MG/DL (ref 8–22)
CALCIUM SERPL-MCNC: 9.4 MG/DL (ref 8.5–10.5)
CHLORIDE BLD-SCNC: 106 MMOL/L (ref 98–107)
CHOLEST SERPL-MCNC: 224 MG/DL
CO2 SERPL-SCNC: 21 MMOL/L (ref 22–31)
CREAT SERPL-MCNC: 0.8 MG/DL (ref 0.6–1.1)
FASTING STATUS PATIENT QL REPORTED: YES
GFR SERPL CREATININE-BSD FRML MDRD: >60 ML/MIN/1.73M2
GLUCOSE BLD-MCNC: 92 MG/DL (ref 70–125)
HBA1C MFR BLD: 5.5 %
HDLC SERPL-MCNC: 54 MG/DL
LDLC SERPL CALC-MCNC: 136 MG/DL
POTASSIUM BLD-SCNC: 4.7 MMOL/L (ref 3.5–5)
SODIUM SERPL-SCNC: 138 MMOL/L (ref 136–145)
TRIGL SERPL-MCNC: 171 MG/DL

## 2021-05-03 NOTE — ASSESSMENT & PLAN NOTE
50 y.o. year old female in clinic today to discuss treatment of the following conditions through diet and lifestyle modification and weight loss:  1. Class 1 obesity due to excess calories with serious comorbidity and body mass index (BMI) of 34.0 to 34.9 in adult    2. Benign essential hypertension    3. Prediabetes    4. Metabolic syndrome X    5. Avitaminosis D      The patient's weight loss result since the last visit was mixed based on weight regain while she has recognized worsening dietary patterns and moved to correct over the past couple of weeks.  We will perform fasting lab work today.  We discussed whether or not we should consider her diabetic or prediabetic and I am inclined to recommend that going forward we should consider as having mild diabetes.  We will clarify with today's hemoglobin A1c and other labs.  Cravings have been heavy.  She had been doing well on the decreased dose of phentermine.  We will increase phentermine back up to 30 mg.  Liraglutide (Saxenda) is likely covered by insurance but we will hold off making this shift for now.  She will continue Metformin.  Follow-up in 2-3 months recommended.  Blood pressure well controlled.

## 2021-05-03 NOTE — ASSESSMENT & PLAN NOTE
Anticipate hemoglobin A1c similar to her measurement from last year.  She is describing symptoms of hyperinsulinemia.  Given her hemoglobin A1c measurements from 2019 and 2020, I am inclined to think of her condition as being a type 2 diabetes.  If her LDL cholesterol is high we will plan to start a low-dose of rosuvastatin, aspirin and continued to work on medical weight loss.  Consider SGLT2 inhibitor or GLP-1 receptor agonist in addition to Metformin (which also could be increased).

## 2021-05-04 LAB
25(OH)D3 SERPL-MCNC: 11.1 NG/ML (ref 30–80)
25(OH)D3 SERPL-MCNC: 11.1 NG/ML (ref 30–80)

## 2021-05-05 ENCOUNTER — AMBULATORY - HEALTHEAST (OUTPATIENT)
Dept: FAMILY MEDICINE | Facility: CLINIC | Age: 51
End: 2021-05-05

## 2021-05-05 DIAGNOSIS — E55.9 AVITAMINOSIS D: ICD-10-CM

## 2021-05-27 VITALS
HEART RATE: 75 BPM | DIASTOLIC BLOOD PRESSURE: 91 MMHG | OXYGEN SATURATION: 95 % | SYSTOLIC BLOOD PRESSURE: 143 MMHG | TEMPERATURE: 99 F | RESPIRATION RATE: 19 BRPM

## 2021-05-28 ASSESSMENT — ASTHMA QUESTIONNAIRES: ACT_TOTALSCORE: 24

## 2021-05-29 NOTE — TELEPHONE ENCOUNTER
Left message to call back for: Mana   Information to relay to patient:  Relay message below. I have also sent a letter to her home with all of the lab work.    Your vitamin D level was moderately low.  I recommend replacement.  This takes the form of 12 weeks of medication taken weekly. This medication was sent to your pharmacy.  While you are taking vitamin D replacement, you should not be taking daily supplementation.  After conclusion of the replacement period, you should resume (or start) taking 2000 international units of vitamin D daily.     Jordyn Loving LPN

## 2021-05-29 NOTE — TELEPHONE ENCOUNTER
Patient Returning Call  Reason for call:  Returning VM   Information relayed to patient:  Relayed below message to patient   Patient has additional questions:  No  If YES, what are your questions/concerns:  no  Okay to leave a detailed message?: no call back needed
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
## 2021-05-29 NOTE — PATIENT INSTRUCTIONS - HE
Keys to Success    Minimum 3 meals per day, control daily calories   - 1200 -1500 female   - 1600 male  Minimum of 4 palm servings of protein daily.  Begin everyday with protein    - ~80 gm for female   - 120 gm for male  Be mindful of net carbs  50-75 gm/day   - (Total carbs - fiber)   - Less than 5 gm of carbs with breakfast    Supplementation   - 1 multivitamin    - clear and copious urination (adequate water consumption)   - 2000 mg fish oil capsules    - 2000 iu Vitamin D (I will be checking your level today and may send a prescription to your pharmacy if necessary)    Next month:   - goals of therapy   - sleep   - Paulette program?   - Zoloft for night eating syndrome   - 24 week program?

## 2021-05-30 NOTE — PROGRESS NOTES
Medical  Weight Loss Initial Diet Evaluation    Mana is presenting today for a new weight management nutrition consultation. Pt has also had an initial appointment with Bariatrician Dr. Ba.    Patient is taking phentermine and topamax and metformin. Goal weight is 145-150lb.   Nutrition Assessment:   Anthropometrics:  Pt's Initial Weight: 221 lbs  Weight: 218 lb (98.9 kg)  Weight loss from initial: 3  % Weight loss: 1.36 %    BMI: Body mass index is 40.52 kg/m .  IBW: 105-115lb  Estimated RMR ([place]-St Jeor equation): 1578kcal  Recommended protein needs: 63-84g    Medical History:  Patient Active Problem List   Diagnosis     Class 3 severe obesity due to excess calories with serious comorbidity and body mass index (BMI) of 40.0 to 44.9 in adult (H)     Benign essential hypertension     Prediabetes     Metabolic syndrome X     Avitaminosis D       Nutrition History:   Food allergies/intolerances/restrictions: No   Weight loss history: a year ago- phentermine and topamax  Biggest weight loss struggle per pt: sweet tooth and soda  Dietary Recall: wake up 6a  Breakfast: skip  Snack:11a- banana or yogurt  Lunch: yogurt   Snack: none  Dinner: few tablespoons of potato salad and 1/2 pulled pork sandwich- couple bites of baked chicken, yogurt   Snack: none  Overnight eating: No    Hydration (type/oz. per day):  Water: 34oz  Caffeine/carbonation: 1-2 regular sodas per day    Exercise:  Routine exercise established: No  Walking dog daily   Nutrition Diagnosis (PES statement):   (NI-1.3)Excessive energy intake related to Food and nutrition related knowledge deficit concerning excessive energy/oral intake as evidenced by Intake of high caloric density foods/beverages (juice, soda, alcohol) at meals and/or snacks; large portion; frequent grazing; Estimated intake that exceeds estimated daily energy intake; Binge eating patterns; Frequent excessive fast food or restaurant intake; and BMI 40.52    Nutrition  Intervention:  1. Discussed with patient how to build a meal: the importance of including a lean/low fat protein at each meal, include a source of vegetables at a minimum of lunch and dinner, and limiting carbohydrate intake to 1 serving (15 grams) per meal.  2. Placed emphasis on importance of developing a healthy eating routine, aiming for 3 meals a day and no snacks.   3. Educated on sources of lean protein, portion sizes, and the amount of grams found in each source. Recommend pt to aim for 20-30 grams of protein at each meal; 60-80 grams per day.  4. Discussed using a protein supplement as a meal replacement; provided product examples.   5. Encouraged importance of developing routine exercise for health benefits and weight loss.   Handouts provided:  Plate Method  List of Lean Protein Sources  Protein Supplement Handout  Nutrition Monitoring/Evaluation:   Nutrition Goal Established by Patient:  1. Shop and plan for easy grab and go protein choices  2. Plan dinner meal around the plate method   Follow up/Monitoring:  Will monitor weight change, protein intake, hydration, fruit and vegetable intake and exercise for next visit.  Follow up with RD in 2 months.      Time Spent with patient 30 minutes  ABN: Yes

## 2021-05-30 NOTE — PROGRESS NOTES
"Assessment and Plan:     Class 3 severe obesity due to excess calories with serious comorbidity and body mass index (BMI) of 40.0 to 44.9 in adult (H)  48 y.o. year old female in clinic today to discuss treatment of the following conditions through diet and lifestyle modification and weight loss:  1. Class 3 severe obesity due to excess calories with serious comorbidity and body mass index (BMI) of 40.0 to 44.9 in adult (H)    2. Benign essential hypertension    3. Prediabetes    4. Metabolic syndrome X      The patient's weight loss result since the last visit was successful based on weight loss and reduced sugar consumption.  She has not fully eliminated sugar.   - continue to build nutritional plan.  She has been reliant on poor food choices and small portions.  Limited protein and fat.     - reduce topiramate to 50 mg with dinner.    - continue phentermine at 15 mg   - discussed nutrition.  Add protein and fat.  Reduced dose of topiramate to gain more appetite.      Follow-up: 1 months      Continue supplements.    Recommend increasing movement throughout the day--sitting less, moving more.  Will increase activity over time to reach a goal of at least 150 minutes of moderate exercise each week.  Behavior modification:  Cognitive restructuring exercises, journaling stressors, triggers for food cravings.  Dietary Intervention:  Reduced calorie, reduced carbohydrates, whole food diet.  Greater than 50% of this 15 minute visit was spent in counseling regarding patient's obesity, medications, dietary, exercise, and behavior modification recommendations.    Subjective  Patient presents for treatment of chronic, comorbid conditions using intensive therapeutic lifestyle interventions including nutrition, physical activity, and behavior management.   - successes: \"Lost a few pounds.\"  Working to reduce sugar.  She says that she is slowly making progress reducing her consumption of sugar.  \"Drastic change.\"  Less cravings " "in the evening and throughout the day.  Down to one pop per day.  \"It takes really bad.\"   More energy (\"not as tired\")  - lunch: yogurt or banana.   - struggles: worried about DM.  Meeting with dietician.     - exercise plan: trying to walk as much as possible.   - tracking/journaling: no   - following nutritional plan: ?.  Deviations from plan: SSBs   - hunger: improved.   - medication side effects: word finding with topiramate.  Current dose is 100 mg.  Dry mouth   - Barriers to losing weight:  Behavior:  inadequate exercise    Patient Active Problem List   Diagnosis     Class 3 severe obesity due to excess calories with serious comorbidity and body mass index (BMI) of 40.0 to 44.9 in adult (H)     Benign essential hypertension     Prediabetes     Metabolic syndrome X     Avitaminosis D       Current Outpatient Medications on File Prior to Visit   Medication Sig Dispense Refill     ergocalciferol (ERGOCALCIFEROL) 50,000 unit capsule Take 1 capsule (50,000 Units total) by mouth once a week for 12 doses. 12 capsule 0     metFORMIN (GLUCOPHAGE XR) 500 MG 24 hr tablet Take 1 tablet (500 mg total) by mouth daily. 30 tablet 2     [DISCONTINUED] phentermine 15 MG capsule Take 1 capsule (15 mg total) by mouth every morning. 30 capsule 0     [DISCONTINUED] topiramate (TOPAMAX) 25 MG tablet Start at 25 mg.  Increase by 1 tab every 7 days as tolerated 120 tablet 1     No current facility-administered medications on file prior to visit.        Objective:  Vitals:    07/01/19 1005   BP: 116/62   Pulse: 88     Initial Weight: 221 lbs  Weight: 218 lb (98.9 kg)  Weight loss from initial: 3  % Weight loss: 1.36 %    Body mass index is 40.52 kg/m .  Patient's last menstrual period was 06/18/2019.  General:  Patient is alert, pleasant, no distress.  Patient is obese.    This note has been dictated using voice recognition software. Any grammatical or context distortions are unintentional and inherent to the software  "

## 2021-05-31 NOTE — TELEPHONE ENCOUNTER
Controlled Substance Refill Request  Medication:   Requested Prescriptions     Pending Prescriptions Disp Refills     ergocalciferol (ERGOCALCIFEROL) 50,000 unit capsule 12 capsule 0     Sig: Take 1 capsule (50,000 Units total) by mouth once a week.     phentermine 15 MG capsule 30 capsule 0     Sig: Take 1 capsule (15 mg total) by mouth every morning.     Date Last Fill: 7/1/19  Pharmacy: [facility]   Submit electronically to pharmacy  Controlled Substance Agreement on File:   Encounter-Level CSA Scan Date:    There are no encounter-level csa scan date.       Last office visit: Last office visit pertaining to requested medication was 7/1/19.    Provider Refill Request  Medication:  Vitamin d  RN can NOT refill this medication per RN refill protocol because med is not covered by policy/route to provider     Bria Lock RN [facility] Triage/Medication Refill

## 2021-05-31 NOTE — PATIENT INSTRUCTIONS - HE
You were seen today for flu-like symptoms. This is still most likely due to a virus. You are contagious until your fever is gone for 24 hours. Maintain good hand hygiene, cover your cough, and limit contact to prevent spreading the illness. Symptoms typically last 1-2 weeks.    Symptom management:  - Drink plenty of fluids and allow for plenty of rest  - Use tylenol or ibuprofen every 6-8 hours as needed for fever/discomfort  - May use albuterol inhaler to help with chest tightness  - Use Flonase to help with sinus congestion    Reasons to be seen immediately for re-evaluation:  - Have trouble breathing or are short of breath  - Feel pain or pressure in your chest or belly  - Get suddenly dizzy  - Feel confused  - Have severe vomiting  - Fevers of 100.4 or higher persist for greater than 3 days or return    If no symptom improvement in 1 week, follow-up with your primary care provider.

## 2021-05-31 NOTE — PROGRESS NOTES
Assessment and Plan:     Class 2 severe obesity due to excess calories with serious comorbidity and body mass index (BMI) of 39.0 to 39.9 in adult (H)  48 y.o. year old female in clinic today to discuss treatment of the following conditions through diet and lifestyle modification and weight loss:  1. Class 2 severe obesity due to excess calories with serious comorbidity and body mass index (BMI) of 39.0 to 39.9 in adult (H)    2. Benign essential hypertension    3. Prediabetes    4. Metabolic syndrome X    5. Class 3 severe obesity due to excess calories with serious comorbidity and body mass index (BMI) of 40.0 to 44.9 in adult (H)      The patient's weight loss result since the last visit was successful based on weight loss. She is struggling to completely eliminate SSBs and requests an upward dose adjustment of phentermine.  She describes adequate nutrition and incremental improvement in food preparation and macronutritient breakdown.   - increase phentermine to 30 mg.  We discussed that this should correspond to an effort to quit soda.  She will focus on getting adequate nutrition at lunch time   - continue topiramate.   - discussed pregnancy category of above combination.  She is not sexually active and does not plan to resume sexual activity.  She is aware that I would recommend hormonal contraception if this becomes a possibility.   - return to clinic in 4 weeks.     Continue supplements.    Recommend increasing movement throughout the day--sitting less, moving more.  Will increase activity over time to reach a goal of at least 150 minutes of moderate exercise each week.  Behavior modification:  Cognitive restructuring exercises, journaling stressors, triggers for food cravings.  Dietary Intervention:  Reduced calorie, reduced carbohydrates, whole food diet.  Greater than 50% of this 15 minute visit was spent in counseling regarding patient's obesity, medications, dietary, exercise, and behavior modification  "recommendations.    Subjective  Patient presents for treatment of chronic, comorbid conditions using intensive therapeutic lifestyle interventions including nutrition, physical activity, and behavior management.   - successes: \"Still doing the same thing.\"  One pop per day ([employer]). More chicken and beef.  More veggies.     - struggles: time for exercise.  Frustrated by lack of change.  Wonders if she would do better on an increased dose of phentermine.     - mood: has been a lot better.  She is eating during the day.  She is packing a lunch and cooking for herself   - exercise plan: walking more.  Uses [employer] on phone for [employer].     - tracking/journaling: yes   - following nutritional plan: improved.   - hunger: improved.   - medication side effects: none   - Barriers to losing weight:  Behavior:  inadequate exercise    Patient Active Problem List   Diagnosis     Class 2 severe obesity due to excess calories with serious comorbidity and body mass index (BMI) of 39.0 to 39.9 in adult (H)     Benign essential hypertension     Prediabetes     Metabolic syndrome X     Avitaminosis D       Current Outpatient Medications on File Prior to Visit   Medication Sig Dispense Refill     ergocalciferol (ERGOCALCIFEROL) 50,000 unit capsule Take 1 capsule (50,000 Units total) by mouth once a week. 12 capsule 0     [DISCONTINUED] phentermine 15 MG capsule Take 1 capsule (15 mg total) by mouth every morning. 30 capsule 0     albuterol (PROAIR HFA;PROVENTIL HFA;VENTOLIN HFA) 90 mcg/actuation inhaler Inhale 2 puffs every 6 (six) hours as needed for wheezing. 1 each 0     fluticasone propionate (FLONASE) 50 mcg/actuation nasal spray 2 sprays into each nostril daily for 7 days. 16 g 0     [DISCONTINUED] metFORMIN (GLUCOPHAGE XR) 500 MG 24 hr tablet Take 1 tablet (500 mg total) by mouth daily. 30 tablet 2     [DISCONTINUED] topiramate (TOPAMAX) 25 MG tablet Take 2 tablets (50 mg total) by mouth daily. 90 tablet 1     No current " facility-administered medications on file prior to visit.        Objective:  Vitals:    08/12/19 0925   BP: 112/64   Pulse: 88     Initial Weight: 221 lbs  Weight: 215 lb (97.5 kg)  Weight loss from initial: 6  % Weight loss: 2.71 %    Body mass index is 39.97 kg/m .  Patient's last menstrual period was 08/12/2019.  General:  Patient is alert, pleasant, no distress.  Patient is obese.    This note has been dictated using voice recognition software. Any grammatical or context distortions are unintentional and inherent to the software

## 2021-05-31 NOTE — PROGRESS NOTES
Assessment:        1. Influenza-like illness  Rapid Strep A Screen-Throat    Group A Strep, RNA Direct Detection, Throat    fluticasone propionate (FLONASE) 50 mcg/actuation nasal spray    albuterol (PROAIR HFA;PROVENTIL HFA;VENTOLIN HFA) 90 mcg/actuation inhaler          Plan:       Fluids, rest  Albuterol inhaler  Trial of nasal steroids  OTC analgesics  Discussed signs of worsening symptoms and when to follow-up with PCP if no symptom improvement.       Patient Instructions   You were seen today for flu-like symptoms. This is still most likely due to a virus. You are contagious until your fever is gone for 24 hours. Maintain good hand hygiene, cover your cough, and limit contact to prevent spreading the illness. Symptoms typically last 1-2 weeks.    Symptom management:  - Drink plenty of fluids and allow for plenty of rest  - Use tylenol or ibuprofen every 6-8 hours as needed for fever/discomfort  - May use albuterol inhaler to help with chest tightness  - Use Flonase to help with sinus congestion    Reasons to be seen immediately for re-evaluation:  - Have trouble breathing or are short of breath  - Feel pain or pressure in your chest or belly  - Get suddenly dizzy  - Feel confused  - Have severe vomiting  - Fevers of 100.4 or higher persist for greater than 3 days or return    If no symptom improvement in 1 week, follow-up with your primary care provider.        Subjective:        Mana Bull is a 48 y.o. female here for evaluation of a flu-like illness. Onset was 1 day ago. Symptoms include heavy chest, dry cough, body aches, headache, chills, fatigue, and sore throat. Patient denies active fevers above 100.4.  Patient is a current smoker. No history of asthma.  No medications used for treatment.    The following portions of the patient's history were reviewed and updated as appropriate: allergies, current medications and problem list.    Review of Systems  Pertinent items are noted in HPI.      Allergies  No Known Allergies       Objective:       /72 (Patient Site: Right Arm, Patient Position: Sitting, Cuff Size: Adult Regular)   Pulse 85   Temp 98.2  F (36.8  C) (Oral)   Resp 18   Wt 217 lb 11.2 oz (98.7 kg)   LMP 07/15/2019 (Approximate)   SpO2 98%   Breastfeeding? No   BMI 40.47 kg/m    General appearance: alert, appears stated age, cooperative, no distress and non-toxic  Head: Normocephalic, without obvious abnormality, atraumatic  Ears: right: TM intact with mucoid fluid and bulging, no erythema. Left: TM intact with no fluid, erythema, or bulging. External ears normal  Nose: no discharge  Throat: posterior oropharynx eryhema, no tonsil swelling or exudate; MMM, lips and tongue normal  Neck: mild anterior cervical adenopathy and supple, symmetrical, trachea midline  Lungs: clear to auscultation bilaterally and no rhonchi, rales, or wheezing  Heart: regular rate and rhythm, S1, S2 normal, no murmur, click, rub or gallop     Lab Results    Recent Results (from the past 24 hour(s))   Rapid Strep A Screen-Throat   Result Value Ref Range    Rapid Strep A Antigen No Group A Strep detected, presumptive negative No Group A Strep detected, presumptive negative     I personally reviewed these results and discussed findings with the patient.

## 2021-06-01 NOTE — TELEPHONE ENCOUNTER
Controlled Substance Refill Request  Medication:   Requested Prescriptions     Pending Prescriptions Disp Refills     phentermine 30 MG capsule 30 capsule 0     Sig: Take 1 capsule (30 mg total) by mouth every morning.     Date Last Fill: 8/12/19  [id number]   Submit electronically to pharmacy  Controlled Substance Agreement on File:   Encounter-Level CSA Scan Date:    There are no encounter-level csa scan date.       Last office visit: Last office visit pertaining to requested medication was 8/12/19.

## 2021-06-03 VITALS — HEIGHT: 62 IN | BODY MASS INDEX: 40.12 KG/M2 | WEIGHT: 218 LBS

## 2021-06-03 VITALS — HEIGHT: 62 IN | WEIGHT: 221 LBS | BODY MASS INDEX: 40.67 KG/M2

## 2021-06-03 VITALS — WEIGHT: 217.7 LBS | BODY MASS INDEX: 40.47 KG/M2

## 2021-06-03 VITALS — BODY MASS INDEX: 39.97 KG/M2 | WEIGHT: 215 LBS

## 2021-06-03 VITALS — WEIGHT: 218 LBS | BODY MASS INDEX: 40.52 KG/M2

## 2021-06-03 NOTE — TELEPHONE ENCOUNTER
Left message to call back for: Mana   Information to relay to patient:  Pt is due for a follow up appointment. Please schedule when she calls back    My chart message sent as well    Jordyn Loving LPN

## 2021-06-03 NOTE — TELEPHONE ENCOUNTER
Class 2 severe obesity due to excess calories with serious comorbidity and body mass index (BMI) of 39.0 to 39.9 in adult (H)  This patient has not been seen in follow-up since increasing her dose of phentermine from 15 mg to 30 mg.  She was to have come to clinic in September.  Unclear follow-up plan.  She should be scheduled with stated intent to follow-up in clinic before additional refills.

## 2021-06-03 NOTE — TELEPHONE ENCOUNTER
Controlled Substance Refill Request  Medication:   Requested Prescriptions     Pending Prescriptions Disp Refills     phentermine 30 MG capsule 30 capsule 0     Sig: Take 1 capsule (30 mg total) by mouth every morning.       Date Last Fill: 9/19/19    Pharmacy: Pharmacy: [facility]        Submit electronically to pharmacy      Controlled Substance Agreement on File:   Encounter-Level CSA Scan Date:    There are no encounter-level csa scan date.           Last office visit: 8/12/2019 Abimael Walker MD

## 2021-06-04 VITALS — BODY MASS INDEX: 36.25 KG/M2 | WEIGHT: 195 LBS

## 2021-06-04 VITALS — BODY MASS INDEX: 34.02 KG/M2 | WEIGHT: 183 LBS

## 2021-06-04 VITALS — WEIGHT: 224 LBS | BODY MASS INDEX: 41.64 KG/M2

## 2021-06-05 VITALS
HEART RATE: 92 BPM | BODY MASS INDEX: 34.95 KG/M2 | WEIGHT: 188 LBS | DIASTOLIC BLOOD PRESSURE: 60 MMHG | SYSTOLIC BLOOD PRESSURE: 116 MMHG

## 2021-06-05 VITALS — WEIGHT: 179 LBS | BODY MASS INDEX: 33.27 KG/M2

## 2021-06-05 VITALS — BODY MASS INDEX: 32.9 KG/M2 | WEIGHT: 177 LBS

## 2021-06-08 NOTE — TELEPHONE ENCOUNTER
Left message to call back for: Zahraa   Information to relay to patient:  See message below from Dr. Peralta and assist in scheduling when she calls back    4 week follow-up (6/29 or 6/30)     Jordyn Loving LPN

## 2021-06-08 NOTE — PATIENT INSTRUCTIONS - HE
Dr. Jose Neal MD   - Always Hungry    Dr. Rae Mathis, DO.   Search for her name in [employer] with added criteria: [employer]    Keto website: [email or web address]

## 2021-06-08 NOTE — PROGRESS NOTES
"Mana Bull is a 49 y.o. female who is being evaluated via a billable video visit.      The patient has been notified of following:     \"This video visit will be conducted via a call between you and your physician/provider. We have found that certain health care needs can be provided without the need for an in-person physical exam.  This service lets us provide the care you need with a video conversation.  If a prescription is necessary we can send it directly to your pharmacy.  If lab work is needed we can place an order for that and you can then stop by our lab to have the test done at a later time.    Video visits are billed at different rates depending on your insurance coverage. Please reach out to your insurance provider with any questions.    If during the course of the call the physician/provider feels a video visit is not appropriate, you will not be charged for this service.\"    Patient has given verbal consent to a Video visit? Yes    Patient would like to receive their AVS by AVS Preference: Bonny.    Patient would like the video invitation sent by: Text to cell phone: [phone]    Will anyone else be joining your video visit? No    Video Start Time: 10:10 AM    Additional provider notes:     Assessment and Plan:     Class 3 severe obesity due to excess calories with serious comorbidity and body mass index (BMI) of 40.0 to 44.9 in adult (H)  49 y.o. year old female in clinic today to discuss treatment of the following conditions through diet and lifestyle modification and weight loss:  1. Class 3 severe obesity due to excess calories with serious comorbidity and body mass index (BMI) of 40.0 to 44.9 in adult (H)    2. Benign essential hypertension    3. Prediabetes    4. Metabolic syndrome X    5. Avitaminosis D    6. Binge eating      The patient's weight loss result since the last visit was unsuccessful based on weight gain.   Towards the end of last summer, she lost momentum and started " "eating starchy carbohydrates and convenience foods again.  She describes it as \"lazy\" but I believe it is actually result of metabolic syndrome and hyperinsulinemia.  She was unsuccessful at completely eliminating sugar sweetened beverages from her diet.  There is also a strong drive to binge as a comorbid condition which is making this more challenging.  She is very interested in a ketogenic style of eating.  We spent much of our visit talking about hyperinsulinemia as 1 of the key factors in her struggles and even in her decision making process.  Her and her sister will implement a ketogenic style of eating.  I believe this will be cost neutral she is currently eating most of her food out from fast food restaurants.  To help facilitate this we will employ phentermine and topiramate.  Previously, she tolerated these medications without difficulty.  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  She also will have fasting lab work.  If she does test positive for diabetes we will proceed with the dietary intervention and get metformin going again.  We will consider liraglutide at that point as well.  We discussed the possibility of employing a fasting mimicry diet 7/1-7/5.  We will discuss during her next clinic visit which will likely be scheduled 6/28 or 6/29.    Continue supplements.    Recommend increasing movement throughout the day--sitting less, moving more.  Will increase activity over time to reach a goal of at least 150 minutes of moderate exercise each week.  Behavior modification:  Cognitive restructuring exercises, journaling stressors, triggers for food cravings.  Dietary Intervention:  Reduced calorie, reduced carbohydrates, whole food diet.  Greater than 50% of this 45 minute visit was spent in counseling regarding patient's obesity, medications, dietary, exercise, and behavior modification recommendations.    Subjective  Patient presents for treatment of chronic, comorbid " "conditions using intensive therapeutic lifestyle interventions including nutrition, physical activity, and behavior management.    RESTART / Narrative history:   - Last year she was successful in maintaining her meal plan.  She found medications to be helpful.  She says that she backtracked. \"I got lazy.\" She was able to completely get off soda for three weeks.  She substituted flavored water.   - she has started to walk more (with sister)   - \"21 day fix\"   - sweat craving remain high.   - overall energy has been okay. \"Good days and bad days.\"  Some tiredness during work week.  She has had some knee pain.  She wonders if this is related to weight.     - binging: yes.  Active: ~2x/week.   - Lunch: door dash.  She does not always eat dinner. But, she will then snack later on.     - sleep: she tries to sleep through the night.  She snacks at nighttime.      She has the following co-morbidities:    [facility] CO-MORBIDITIES 6/1/2020   How has your weight changed over the last year?  Gained   How many pounds? 25   I have the following health issues associated with obesity: Pre-Diabetes, High Blood Pressure   I have the following symptoms associated with obesity: Knee Pain, Depression, Lower Extremity Swelling, Back Pain, Fatigue, Irregular Menstral Cycle       Patient goals reviewed with patient 6/1/2020   I am interested in having a healthier weight to diminish current health problems: Yes   I am interested in having a healthier weight in order to prevent future health problems: Yes   I am interested in having a healthier weight in order to have a future surgery: No   I have the following family history of obesity/being overweight:  Many of my relatives are overweight   I have the following family history of obesity/being overweight:  Many of my relatives are overweight       Referring Provider 6/1/2020   Please name the provider who referred you to Medical Weight Management.  If you do not know, please answer: \"I " "Don't Know\". N/A        Weight History Reviewed With Patient 6/1/2020   The following factors have contributed to my weight gain:  After Stopping an Addictive Drug or Alcohol, Eating Wrong Types of Food, Eating Too Much, Lack of Exercise, Stress   Please list the medications you have tried. Phentermine, Topamax   My lowest weight since age 20 was: 145   My highest weight since age 20 was: 225   The most weight I have ever lost was: (lbs) 20       Diet Recall Reviewed With Patient 6/1/2020   Do you typically eat breakfast? No   Do you typically eat lunch? Yes   If you do eat lunch, what types of food do you typically eat?  Burgers, fries, pizza, tacos, subs   Do you typically eat supper? Yes   If you do eat supper, what types of food do you typically eat? Spaghetti, burgers, rice, chicken, pork chops, potatoes, mac & cheese   Do you typically eat snacks? Yes   If you do snack, what types of food do you typically eat? Potato chips, chocolate, cakes, candy bar, Ice cream   Do you like vegetables?  Yes   Do you drink water?  Yes   How many glasses of juice do you drink in a typical day? 1   How many of glasses of milk do you drink in a typical day? 0   If you do drink milk, what type? 2%   How many 8oz glasses of sugar containing drinks such as Beni-Aid/sweet tea do you drink in a day? 1   How many cans/bottles of sugar pop/soda/tea/sports drinks do you drink in a day? 3   How many cans/bottles of sugar pop/soda/tea/sports drinks do you drink in a day? 3   How often do you have a drink of alcohol? Never       Eating Habits Reviewed With Patient 6/1/2020   Eats Starches Everyday   Generally, my meals include foods like these: fried meats, brats, burgers, french fries, pizza, cheese, chips, or ice cream. Almost Everyday   Eat fast food (like McDonal[employer], [employer], [employer]aco Bell). A Few Times a Week   Buffet Less Than Weekly   Watches TV Almost Everyday   Often skip meals, eat at random times, have no regular eating times. A " Few Times a Week   Grazes Less Than Weekly   Eat extra snacks between meals. Everyday   Eat most of my food at the end of the day. A Few Times a Week   Eats at Night Almost Everyday   Eat extra snacks to prevent or correct low blood sugar. Never   Eat to prevent acid reflux or stomach pain. Never   Worry about not having enough food to eat. Never   Have you been to the food shelf at least a few times this year? No   I eat when I am depressed. A Few Times a Week   I eat when I am stressed. A Few Times a Week   I eat when I am bored. Almost Everyday   I eat when I am anxious. Less Than Weekly   I eat when I am happy or as a reward. A Few Times a Week   I feel hungry all the time even if I just have eaten. Less Than Weekly   Feeling full is important to me. Almost Everyday   I finish all the food on my plate even if I am already full. Never   I can't resist eating delicious food or walk past the good food/smell. A Few Times a Week   I eat/snack without noticing that I am eating. Never   I eat when I am preparing the meal. Less Than Weekly   I eat more than usual when I see others eating. Less Than Weekly   I have trouble not eating sweets, ice cream, cookies, or chips if they are around the house. Everyday   I think about food all day. A Few Times a Week   What foods, if any, do you crave? Sweets / Candy / Chocolate, Chips / Crackers, Cheese   Please list any other foods you crave. Pasta, tacos, burgers, grilled sandwiches       Activity/Exercise History Reviewed With Patient 6/1/2020   How much of a typical 12 hour day do you spend sitting? Less Than Half the Day   How much of a typical 12 hour day do you spend lying down? Less Than Half the Day   How much of a typical day do you spend walking/standing? Half the Day   How many hours (not including work) do you spend on the TV/Video Games/Computer/Tablet/Phone? 4-5 Hours   How many times a week are you active for the purpose of exercise? 2-3 Times a Week   How many  total minutes do you spend doing some activity for the purpose of exercising when you exercise? More Than 30 Minutes   What keeps you from being more active? Too Tired     No flowsheet data found.    Patient Active Problem List   Diagnosis     Class 3 severe obesity due to excess calories with serious comorbidity and body mass index (BMI) of 40.0 to 44.9 in adult (H)     Benign essential hypertension     Prediabetes     Metabolic syndrome X     Avitaminosis D     Binge eating       Current Outpatient Medications on File Prior to Visit   Medication Sig Dispense Refill     albuterol (PROAIR HFA;PROVENTIL HFA;VENTOLIN HFA) 90 mcg/actuation inhaler Inhale 2 puffs every 6 (six) hours as needed for wheezing. 1 each 0     [DISCONTINUED] metFORMIN (GLUCOPHAGE XR) 500 MG 24 hr tablet Take 1 tablet (500 mg total) by mouth daily. 30 tablet 2     [DISCONTINUED] phentermine 30 MG capsule Take 1 capsule (30 mg total) by mouth every morning. 30 capsule 0     [DISCONTINUED] topiramate (TOPAMAX) 25 MG tablet Take 2 tablets (50 mg total) by mouth daily. 180 tablet 1     No current facility-administered medications on file prior to visit.        Objective:  There were no vitals filed for this visit.  Weight: (!) 224 lb (101.6 kg)    Body mass index is 41.64 kg/m .  Patient's last menstrual period was 05/10/2020.  GENERAL: Healthy, alert and no distress  EYES: Eyes grossly normal to inspection. No discharge or erythema, or obvious scleral/conjunctival abnormalities.  RESP: No audible wheeze, cough, or visible cyanosis.  No visible retractions or increased work of breathing.    SKIN: Visible skin clear. No significant rash, abnormal pigmentation or lesions.  NEURO: Cranial nerves grossly intact. Mentation and speech appropriate for age.  PSYCH: Mentation appears normal, affect normal/bright, judgement and insight intact, normal speech and appearance well-groomed    This note has been dictated using voice recognition software. Any  grammatical or context distortions are unintentional and inherent to the software        Video-Visit Details    Type of service:  Video Visit    Video End Time (time video stopped): 10:55 AM  Originating Location (pt. Location): Home    Distant Location (provider location):  [facility]/OB     Platform used for Video Visit: Reji Walker MD

## 2021-06-09 NOTE — PROGRESS NOTES
"Mana Bull is a 49 y.o. female who is being evaluated via a billable video visit.      The patient has been notified of following:     \"This video visit will be conducted via a call between you and your physician/provider. We have found that certain health care needs can be provided without the need for an in-person physical exam.  This service lets us provide the care you need with a video conversation.  If a prescription is necessary we can send it directly to your pharmacy.  If lab work is needed we can place an order for that and you can then stop by our lab to have the test done at a later time.    Video visits are billed at different rates depending on your insurance coverage. Please reach out to your insurance provider with any questions.    If during the course of the call the physician/provider feels a video visit is not appropriate, you will not be charged for this service.\"    Patient has given verbal consent to a Video visit? Yes  How would you like to obtain your AVS? AVS Preference: MyChart.  Patient would like the video invitation sent by: [phone]  Will anyone else be joining your video visit? No    Video Start Time: 10:11 AM    Additional provider notes:     Assessment and Plan:     Class 3 severe obesity due to excess calories with serious comorbidity and body mass index (BMI) of 40.0 to 44.9 in adult (H)  49 y.o. year old female in clinic today to discuss treatment of the following conditions through diet and lifestyle modification and weight loss:  1. Class 3 severe obesity due to excess calories with serious comorbidity and body mass index (BMI) of 40.0 to 44.9 in adult (H)    2. Metabolic syndrome X    3. Prediabetes    4. Benign essential hypertension    5. Binge eating    6. Avitaminosis D      The patient's weight loss result since the last visit was successful based on dietary improvement and elimination of sugar/SSBs.  She has completely changed her dietary approach and lost " "about 11 lbs down.     - continue metformin.  Continue phentermine/topiramate   - continue current eating strategies   - ketogenic style eating.     Follow-up: 1 months        Continue supplements.    Recommend increasing movement throughout the day--sitting less, moving more.  Will increase activity over time to reach a goal of at least 150 minutes of moderate exercise each week.  Behavior modification:  Cognitive restructuring exercises, journaling stressors, triggers for food cravings.  Dietary Intervention:  Reduced calorie, reduced carbohydrates, whole food diet.  Greater than 50% of this 15 minute visit was spent in counseling regarding patient's obesity, medications, dietary, exercise, and behavior modification recommendations.    Subjective  Patient presents for treatment of chronic, comorbid conditions using intensive therapeutic lifestyle interventions including nutrition, physical activity, and behavior management.   - successes: \"Very good.\"  She has been executing a ketogenic style of eating.  \"Shocked and amazed.\"  No pop.  No sweets.  \"I decided that I was going to do this and stick to this.\"  \"I feel great.\" Clothes are fitting loosely.  \"Now able to wear.\"  11 lbs down.  She says that she is surprised at how she has been able to get off of sugars.     - struggles: withdrawal from sugar.    - exercise plan: able to  \"walk better.\"  Knee pain is better.     - tracking/journaling: ad esther but watching carbohydrates   - following nutritional plan: yes.  Deviations from plan: none   - hunger: none   - she has partners with Mom, sister and niece (they are struggling more).   - medication: benefits: helped initially.  Unclear if she is getting benefit at this point.. side effects: none    No flowsheet data found.    Patient Active Problem List   Diagnosis     Class 3 severe obesity due to excess calories with serious comorbidity and body mass index (BMI) of 40.0 to 44.9 in adult (H)     Benign essential " hypertension     Prediabetes     Metabolic syndrome X     Avitaminosis D     Binge eating       Current Outpatient Medications on File Prior to Visit   Medication Sig Dispense Refill     albuterol (PROAIR HFA;PROVENTIL HFA;VENTOLIN HFA) 90 mcg/actuation inhaler Inhale 2 puffs every 6 (six) hours as needed for wheezing. 1 each 0     ergocalciferol (VITAMIN D2) 1,250 mcg (50,000 unit) capsule Take 1 capsule (50,000 Units total) by mouth every 7 days. 24 capsule 0     topiramate (TOPAMAX) 25 MG tablet Take 2 tablets (50 mg total) by mouth daily. 180 tablet 1     [DISCONTINUED] metFORMIN (GLUCOPHAGE XR) 500 MG 24 hr tablet Take 1 tablet (500 mg total) by mouth daily. 30 tablet 2     [DISCONTINUED] phentermine 30 MG capsule Take 1 capsule (30 mg total) by mouth every morning. 30 capsule 0     No current facility-administered medications on file prior to visit.        Objective:  There were no vitals filed for this visit.  Weight: (!) 224 lb (101.6 kg)    There is no height or weight on file to calculate BMI.  No LMP recorded.  GENERAL: Healthy, alert and no distress  EYES: Eyes grossly normal to inspection. No discharge or erythema, or obvious scleral/conjunctival abnormalities.  RESP: No audible wheeze, cough, or visible cyanosis.  No visible retractions or increased work of breathing.    NEURO: Cranial nerves grossly intact. Mentation and speech appropriate for age.  PSYCH: Mentation appears normal, affect normal/bright, judgement and insight intact, normal speech and appearance well-groomed        This note has been dictated using voice recognition software. Any grammatical or context distortions are unintentional and inherent to the software        Video-Visit Details    Type of service:  Video Visit    Video End Time (time video stopped): 10:26 AM  Originating Location (pt. Location): Home    Distant Location (provider location):  [facility] MEDICINE/OB     Platform used for Video Visit:  Reji Walker MD

## 2021-06-09 NOTE — TELEPHONE ENCOUNTER
----- Message from Abimael Walker MD sent at 6/29/2020 10:30 AM CDT -----  One month follow-up please.

## 2021-06-10 NOTE — PROGRESS NOTES
"Mana Bull is a 49 y.o. female who is being evaluated via a billable video visit.      The patient has been notified of following:     \"This video visit will be conducted via a call between you and your physician/provider. We have found that certain health care needs can be provided without the need for an in-person physical exam.  This service lets us provide the care you need with a video conversation.  If a prescription is necessary we can send it directly to your pharmacy.  If lab work is needed we can place an order for that and you can then stop by our lab to have the test done at a later time.    Video visits are billed at different rates depending on your insurance coverage. Please reach out to your insurance provider with any questions.    If during the course of the call the physician/provider feels a video visit is not appropriate, you will not be charged for this service.\"    Patient has given verbal consent to a Video visit? Yes  How would you like to obtain your AVS? AVS Preference: [employer].  If dropped by the video visit, the video invitation should be sent to: Text to cell phone: [phone]  Will anyone else be joining your video visit? No    Video Start Time: 5:03PM    Additional provider notes:     Assessment and Plan:     Class 3 severe obesity due to excess calories with serious comorbidity and body mass index (BMI) of 40.0 to 44.9 in adult (H)  49 y.o. year old female in clinic today to discuss treatment of the following conditions through diet and lifestyle modification and weight loss:  1. Class 3 severe obesity due to excess calories with serious comorbidity and body mass index (BMI) of 40.0 to 44.9 in adult (H)    2. Benign essential hypertension    3. Prediabetes    4. Metabolic syndrome X    5. Screening for breast cancer      The patient's weight loss result since the last visit was successful based on weight loss.  She continues to eat and drink well.  Energy has been high.    " "-continue metformin,  -continue phentermine/topiramate.  and  -Reviewed nutritional goals and physical activity.  Continue to evaluate how much the medications help.      Follow-up: 2 months      Continue supplements.    Recommend increasing movement throughout the day--sitting less, moving more.  Will increase activity over time to reach a goal of at least 150 minutes of moderate exercise each week.  Behavior modification:  Cognitive restructuring exercises, journaling stressors, triggers for food cravings.  Dietary Intervention:  Reduced calorie, reduced carbohydrates, whole food diet.  Greater than 50% of this 12 minute visit was spent in counseling regarding patient's obesity, medications, dietary, exercise, and behavior modification recommendations.    Subjective  Patient presents for treatment of chronic, comorbid conditions using intensive therapeutic lifestyle interventions including nutrition, physical activity, and behavior management.   - successes: continues to eat well.  She thinks that she has acclimated to alerted diet.  \"Going real good.\"  She says that she continues to feel \"great.\"  \"I am still shrinking.\"  She says that she does not \"feel lazy\" like she did before.     - struggles: BMs: decreased frequency.  \"Very little.\"  She does not eat much veggies.  Eating meat and cheese.     - exercise plan: more and more recently.     - tracking/journaling: ad esther   - following nutritional plan: ?  She wonders if she is eating enough.    - 1 poweraid per week.  Mostly water.    - hunger: controlled.     - her sister and niece are no longer participating.  Mom is still going.    - medication: benefits: less late night eating. side effects: constipation?    No flowsheet data found.    Patient Active Problem List   Diagnosis     Class 3 severe obesity due to excess calories with serious comorbidity and body mass index (BMI) of 40.0 to 44.9 in adult (H)     Benign essential hypertension     Prediabetes     " Metabolic syndrome X     Avitaminosis D     Binge eating       Current Outpatient Medications on File Prior to Visit   Medication Sig Dispense Refill     albuterol (PROAIR HFA;PROVENTIL HFA;VENTOLIN HFA) 90 mcg/actuation inhaler Inhale 2 puffs every 6 (six) hours as needed for wheezing. 1 each 0     ergocalciferol (VITAMIN D2) 1,250 mcg (50,000 unit) capsule Take 1 capsule (50,000 Units total) by mouth every 7 days. 24 capsule 0     metFORMIN (GLUCOPHAGE) 500 MG tablet Take 1 tablet (500 mg total) by mouth daily with breakfast. 30 tablet 11     [DISCONTINUED] phentermine 30 MG capsule Take 1 capsule (30 mg total) by mouth every morning. 30 capsule 0     [DISCONTINUED] topiramate (TOPAMAX) 25 MG tablet Take 2 tablets (50 mg total) by mouth daily. 180 tablet 1     No current facility-administered medications on file prior to visit.        Objective:  There were no vitals filed for this visit.  Initial Weight: 221 lbs  Weight: 195 lb (88.5 kg)  Weight loss from initial: 26  % Weight loss: 11.76 %    Body mass index is 36.25 kg/m .  Patient's last menstrual period was 07/27/2020.  GENERAL: Healthy, alert and no distress  EYES: Eyes grossly normal to inspection. No discharge or erythema, or obvious scleral/conjunctival abnormalities.  RESP: No audible wheeze, cough, or visible cyanosis.  No visible retractions or increased work of breathing.    SKIN: Visible skin clear. No significant rash, abnormal pigmentation or lesions.  NEURO: Cranial nerves grossly intact. Mentation and speech appropriate for age.  PSYCH: Mentation appears normal, affect normal/bright, judgement and insight intact, normal speech and appearance well-groomed    This note has been dictated using voice recognition software. Any grammatical or context distortions are unintentional and inherent to the software    Video-Visit Details    Type of service:  Video Visit    Video End Time (time video stopped): 5:15 PM  Originating Location (pt. Location):  Home    Distant Location (provider location):  [facility]     Platform used for Video Visit: Farheen Walker MD

## 2021-06-11 NOTE — PROGRESS NOTES
"Mana Bull is a 49 y.o. female who is being evaluated via a billable video visit.      The patient has been notified of following:     \"This video visit will be conducted via a call between you and your physician/provider. We have found that certain health care needs can be provided without the need for an in-person physical exam.  This service lets us provide the care you need with a video conversation.  If a prescription is necessary we can send it directly to your pharmacy.  If lab work is needed we can place an order for that and you can then stop by our lab to have the test done at a later time.    Video visits are billed at different rates depending on your insurance coverage. Please reach out to your insurance provider with any questions.    If during the course of the call the physician/provider feels a video visit is not appropriate, you will not be charged for this service.\"    Patient has given verbal consent to a Video visit? Yes  How would you like to obtain your AVS? AVS Preference: [email or web address].  If dropped by the video visit, the video invitation should be sent to: Text to cell phone: [phone]  Will anyone else be joining your video visit? No    Video Start Time: 11:09 AM    Additional provider notes:     Assessment and Plan:     Class 3 severe obesity due to excess calories with serious comorbidity and body mass index (BMI) of 40.0 to 44.9 in adult (H)  49 y.o. year old female in clinic today to discuss treatment of the following conditions through diet and lifestyle modification and weight loss:  1. Class 3 severe obesity due to excess calories with serious comorbidity and body mass index (BMI) of 40.0 to 44.9 in adult (H)    2. Benign essential hypertension    3. Prediabetes    4. Metabolic syndrome X      The patient's weight loss result since the last visit was successful based on ongoing weight loss.  She is adding variety (more veggies).  She is experimenting with fasting.  Family " "members eat pizza and pasta in front of her which drives some cravings.     - continue metformin   - continue phentermine/topiramate    - discussed sleep: decrease phentermine to 15 mg to see if it helps sleep.        Continue supplements.    Recommend increasing movement throughout the day--sitting less, moving more.  Will increase activity over time to reach a goal of at least 150 minutes of moderate exercise each week.  Behavior modification:  Cognitive restructuring exercises, journaling stressors, triggers for food cravings.  Dietary Intervention:  Reduced calorie, reduced carbohydrates, whole food diet.  Greater than 50% of this 19 minute visit was spent in counseling regarding patient's obesity, medications, dietary, exercise, and behavior modification recommendations.    Subjective  Patient presents for treatment of chronic, comorbid conditions using intensive therapeutic lifestyle interventions including nutrition, physical activity, and behavior management.   - successes: she has been fasting.  She has a \"keto\" group.  Sisters are involved.  Now eating more veggies.  This has helped with BMs.    - struggles: sleep, some craving for carbs.  Stress has been high.  She wakes up around midnight.  Everynight.  No caffeine.  Tobacco: before bed.  Still smoking <1/2 pack per day. No alcohol   - exercise plan: walking on breaks with co-workers.    - tracking/journaling: yes   - following nutritional plan: yes.  Deviations from plan: infrequent.    - hunger: controlled.   - medication: benefits: appetite reduced. side effects: yes    No flowsheet data found.    Patient Active Problem List   Diagnosis     Class 3 severe obesity due to excess calories with serious comorbidity and body mass index (BMI) of 40.0 to 44.9 in adult (H)     Benign essential hypertension     Prediabetes     Metabolic syndrome X     Avitaminosis D     Binge eating       Current Outpatient Medications on File Prior to Visit   Medication Sig " Dispense Refill     albuterol (PROAIR HFA;PROVENTIL HFA;VENTOLIN HFA) 90 mcg/actuation inhaler Inhale 2 puffs every 6 (six) hours as needed for wheezing. 1 each 0     ergocalciferol (VITAMIN D2) 1,250 mcg (50,000 unit) capsule Take 1 capsule (50,000 Units total) by mouth every 7 days. 24 capsule 0     metFORMIN (GLUCOPHAGE) 500 MG tablet Take 1 tablet (500 mg total) by mouth daily with breakfast. 30 tablet 11     [DISCONTINUED] phentermine 30 MG capsule Take 1 capsule (30 mg total) by mouth every morning. 90 capsule 0     [DISCONTINUED] topiramate (TOPAMAX) 25 MG tablet Take 2 tablets (50 mg total) by mouth daily. 180 tablet 1     No current facility-administered medications on file prior to visit.        Objective:  There were no vitals filed for this visit.  Initial Weight: 221 lbs  Weight: 183 lb (83 kg)  Weight loss from initial: 38  % Weight loss: 17.19 %    Body mass index is 34.02 kg/m .  Patient's last menstrual period was 09/07/2020.  GENERAL: Healthy, alert and no distress  EYES: Eyes grossly normal to inspection. No discharge or erythema, or obvious scleral/conjunctival abnormalities.  RESP: No audible wheeze, cough, or visible cyanosis.  No visible retractions or increased work of breathing.    SKIN: Visible skin clear. No significant rash, abnormal pigmentation or lesions.  NEURO: Cranial nerves grossly intact. Mentation and speech appropriate for age.  PSYCH: Mentation appears normal, affect normal/bright, judgement and insight intact, normal speech and appearance well-groomed      This note has been dictated using voice recognition software. Any grammatical or context distortions are unintentional and inherent to the software.      Video-Visit Details    Type of service:  Video Visit    Video End Time (time video stopped): 11:26 AM  Originating Location (pt. Location): Home    Distant Location (provider location):  [facility]     Platform used for Video Visit: Reji Varghese  St Nabil MD

## 2021-06-12 NOTE — TELEPHONE ENCOUNTER
Left message to call back for: Zahraa  Information to relay to patient:   Patient had an appointment with Dr.St Ferrer at 7am today.  I've left her 2 messages to try to get her checked in.       Usha Medina CMA 10/12/2020 7:01 AM   Nelsy WYNN

## 2021-06-12 NOTE — PROGRESS NOTES
"Mana Bull is a 49 y.o. female who is being evaluated via a billable video visit.      The patient has been notified of following:     \"This video visit will be conducted via a call between you and your physician/provider. We have found that certain health care needs can be provided without the need for an in-person physical exam.  This service lets us provide the care you need with a video conversation.  If a prescription is necessary we can send it directly to your pharmacy.  If lab work is needed we can place an order for that and you can then stop by our lab to have the test done at a later time.    Video visits are billed at different rates depending on your insurance coverage. Please reach out to your insurance provider with any questions.    If during the course of the call the physician/provider feels a video visit is not appropriate, you will not be charged for this service.\"    Patient has given verbal consent to a Video visit? Yes  How would you like to obtain your AVS? AVS Preference: [email or web address].  If dropped by the video visit, the video invitation should be sent to: Text to cell phone: [phone]  Will anyone else be joining your video visit? No    Video Start Time: 12:18 PM    Additional provider notes:     Assessment and Plan:     Class 3 severe obesity due to excess calories with serious comorbidity and body mass index (BMI) of 40.0 to 44.9 in adult (H)  49 y.o. year old female in clinic today to discuss treatment of the following conditions through diet and lifestyle modification and weight loss:  1. Class 3 severe obesity due to excess calories with serious comorbidity and body mass index (BMI) of 40.0 to 44.9 in adult (H)    2. Benign essential hypertension    3. Prediabetes    4. Metabolic syndrome X    5. Binge eating      The patient's weight loss result since the last visit was successful based on ongoing weight loss.  She continues to execute her plan.  Some increase of cravings (work " "and with family members).  The patient's mom has continued to be supportive.     - continue phentermine/topiramate   - social struggles (family members that are eating foods that she craves in front of her)   -  Skin concerns: no infections.  Some discomfort with movement.     Continue supplements.    Recommend increasing movement throughout the day--sitting less, moving more.  Will increase activity over time to reach a goal of at least 150 minutes of moderate exercise each week.  Behavior modification:  Cognitive restructuring exercises, journaling stressors, triggers for food cravings.  Dietary Intervention:  Reduced calorie, reduced carbohydrates, whole food diet.  Greater than 50% of this 18 minute visit was spent in counseling regarding patient's obesity, medications, dietary, exercise, and behavior modification recommendations.    Subjective  Patient presents for treatment of chronic, comorbid conditions using intensive therapeutic lifestyle interventions including nutrition, physical activity, and behavior management.   - successes: happy to be at 177lbs.  \"Down down down.  I am doing really good.\"  Lots of veggies.  Consistent with diet.  Melatonin has helped with sleep (5-10mg).  She has been struggling to stay asleep.  She says that she watches TV.     - no change with the dose adjustment on phentermine.   - struggles: more cravings with sweets.  She has a colleague at work who has been helping to keep her accountable.  She is frustrated by excess skin.    - exercise plan: [employer] videos   - tracking/journaling: Aware of macros   - following nutritional plan: yes.  Deviations from plan: none   - hunger: controlled.   - medication side effects: none    No flowsheet data found.    Patient Active Problem List   Diagnosis     Class 3 severe obesity due to excess calories with serious comorbidity and body mass index (BMI) of 40.0 to 44.9 in adult (H)     Benign essential hypertension     Prediabetes     " Metabolic syndrome X     Avitaminosis D     Binge eating       Current Outpatient Medications on File Prior to Visit   Medication Sig Dispense Refill     albuterol (PROAIR HFA;PROVENTIL HFA;VENTOLIN HFA) 90 mcg/actuation inhaler Inhale 2 puffs every 6 (six) hours as needed for wheezing. 1 each 0     metFORMIN (GLUCOPHAGE) 500 MG tablet Take 1 tablet (500 mg total) by mouth daily with breakfast. 30 tablet 11     [DISCONTINUED] phentermine 15 MG capsule Take 1 capsule (15 mg total) by mouth every morning. 30 capsule 0     [DISCONTINUED] topiramate (TOPAMAX) 25 MG tablet Take 2 tablets (50 mg total) by mouth daily. 180 tablet 1     [DISCONTINUED] ergocalciferol (VITAMIN D2) 1,250 mcg (50,000 unit) capsule Take 1 capsule (50,000 Units total) by mouth every 7 days. 24 capsule 0     No current facility-administered medications on file prior to visit.        Objective:  There were no vitals filed for this visit.  Initial Weight: 221 lbs  Weight: 177 lb (80.3 kg)  Weight loss from initial: 44  % Weight loss: 19.91 %    Body mass index is 32.9 kg/m .  Patient's last menstrual period was 09/19/2020.  GENERAL: Healthy, alert and no distress  EYES: Eyes grossly normal to inspection. No discharge or erythema, or obvious scleral/conjunctival abnormalities.  RESP: No audible wheeze, cough, or visible cyanosis.  No visible retractions or increased work of breathing.    SKIN: Visible skin clear. No significant rash, abnormal pigmentation or lesions.  NEURO: Cranial nerves grossly intact. Mentation and speech appropriate for age.  PSYCH: Mentation appears normal, affect normal/bright, judgement and insight intact, normal speech and appearance well-groomed    This note has been dictated using voice recognition software. Any grammatical or context distortions are unintentional and inherent to the software    Video-Visit Details    Type of service:  Video Visit    Video End Time (time video stopped): 12:36 PM  Originating Location (pt.  Location):     Distant Location (provider location):  [facility]     Platform used for Video Visit: Reji Walker MD

## 2021-06-13 NOTE — PROGRESS NOTES
"Mana Bull is a 50 y.o. female who is being evaluated via a billable video visit.      The patient has been notified of following:     \"This video visit will be conducted via a call between you and your physician/provider. We have found that certain health care needs can be provided without the need for an in-person physical exam.  This service lets us provide the care you need with a video conversation.  If a prescription is necessary we can send it directly to your pharmacy.  If lab work is needed we can place an order for that and you can then stop by our lab to have the test done at a later time.    Video visits are billed at different rates depending on your insurance coverage. Please reach out to your insurance provider with any questions.    If during the course of the call the physician/provider feels a video visit is not appropriate, you will not be charged for this service.\"    Patient has given verbal consent to a Video visit? Yes  How would you like to obtain your AVS? AVS Preference: [employer].  If dropped by the video visit, the video invitation should be sent to: Text to cell phone: [phone]  Will anyone else be joining your video visit? No        Video Start Time: 2:23 PM    Additional provider notes:     Assessment and Plan:     Class 3 severe obesity due to excess calories with serious comorbidity and body mass index (BMI) of 40.0 to 44.9 in adult (H)  50 y.o. year old female in clinic today to discuss treatment of the following conditions through diet and lifestyle modification and weight loss:  1. Class 3 severe obesity due to excess calories with serious comorbidity and body mass index (BMI) of 40.0 to 44.9 in adult (H)    2. Suspected COVID-19 virus infection    3. Benign essential hypertension    4. Prediabetes    5. Metabolic syndrome X    6. Binge eating    7. Avitaminosis D      The patient's weight loss result since the last visit was mixed based on weight stability.  She had the " "experience of feeling ill when she ate pasta over the past month.  Weight is stable despite likely COVID infection.  We discussed the benefit of decrease rate of complications with COVID-19 as a result of losing weight over the summer.   -continue metformin,  -continue phentermine/topiramate.  and  -Reviewed nutritional goals and physical activity.  - f/u in ~5 weeks.     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 Patient will check with [facility].  Testing indicated.  BMI estimated at ~33 at this time. No history of asthma.  Albuterol on chart is (presumably) for bronchospasm after being ill earlier this year.        Continue supplements.    Greater than 50% of this 20 minute visit was spent in counseling regarding patient's obesity, medications, dietary, exercise, and behavior modification recommendations.    Subjective  Patient presents for treatment of chronic, comorbid conditions using intensive therapeutic lifestyle interventions including nutrition, physical activity, and behavior management.   - COVID: mild cough, body aches, chills, diarrhea, chest tightening, headache, no energy.  \"It feels like flu.\"  Niece has tested positive.  Works in healthcare.  Symptoms started on 11/28.  She has albuterol \"if I need it.\"  She does not report asthma or lung disease.  She continues to smoke.    - successes: felt ill after foods that are not on her meal list.    - struggles: currently ill.  \"I did cheat this month.\"  She says that she ate freely on her birthday and thanksgiving.  Weight is stable in comparison to last visit. She did not feel well after she had carb laden foods.  Now back to eating well.  She is worried that she will withdrawal from sugar.    - doing well with medications.    - medication: benefits: helpful. side effects: none    No flowsheet data found.    Patient Active Problem List   Diagnosis     Class 3 severe obesity due to excess " calories with serious comorbidity and body mass index (BMI) of 40.0 to 44.9 in adult (H)     Benign essential hypertension     Prediabetes     Metabolic syndrome X     Avitaminosis D     Binge eating     Suspected COVID-19 virus infection       Current Outpatient Medications on File Prior to Visit   Medication Sig Dispense Refill     albuterol (PROAIR HFA;PROVENTIL HFA;VENTOLIN HFA) 90 mcg/actuation inhaler Inhale 2 puffs every 6 (six) hours as needed for wheezing. 1 each 0     [DISCONTINUED] metFORMIN (GLUCOPHAGE) 500 MG tablet Take 1 tablet (500 mg total) by mouth daily with breakfast. 30 tablet 11     [DISCONTINUED] phentermine 15 MG capsule Take 1 capsule (15 mg total) by mouth every morning. 90 capsule 0     [DISCONTINUED] topiramate (TOPAMAX) 25 MG tablet Take 2 tablets (50 mg total) by mouth daily. 180 tablet 1     No current facility-administered medications on file prior to visit.        Objective:  There were no vitals filed for this visit.  Initial Weight: 221 lbs  Weight: 179 lb (81.2 kg)  Weight loss from initial: 42  % Weight loss: 19 %    Body mass index is 33.27 kg/m .  Patient's last menstrual period was 09/30/2020.  GENERAL: Healthy, alert and no distress  EYES: Eyes grossly normal to inspection. No discharge or erythema, or obvious scleral/conjunctival abnormalities.  RESP: No audible wheeze, cough, or visible cyanosis.  No visible retractions or increased work of breathing.    SKIN: Visible skin clear. No significant rash, abnormal pigmentation or lesions.  NEURO: Cranial nerves grossly intact. Mentation and speech appropriate for age.  PSYCH: Mentation appears normal, affect normal/bright, judgement and insight intact, normal speech and appearance well-groomed      This note has been dictated using voice recognition software. Any grammatical or context distortions are unintentional and inherent to the software.      Video-Visit Details    Type of service:  Video Visit    Video End Time (time  video stopped): 2:42 PM  Originating Location (pt. Location): Home    Distant Location (provider location):  [facility]     Platform used for Video Visit: Reji Walker MD

## 2021-06-14 NOTE — TELEPHONE ENCOUNTER
Cancelled orders in your inbox, pt due for follow up, I will reach out to schedule her.    Jordyn Loving LPN

## 2021-06-15 NOTE — PROGRESS NOTES
Type of service:  Video Visit    Mana Bull is a 50 y.o. female who is being evaluated via a billable video visit.      How would you like to obtain your AVS? MyChart.  If dropped from the video visit, the video invitation should be resent by: Text to cell phone: [phone]   Will anyone else be joining your video visit? No    Video Start Time: 2:50 PM  Video End Time (time video stopped): 3:02 PM  Originating Location (pt. Location): Home  Distant Location (provider location):  [facility]   Platform used for Video Visit: [employer]    Assessment and Plan:     Class 3 severe obesity due to excess calories with serious comorbidity and body mass index (BMI) of 40.0 to 44.9 in adult (H)  50 y.o. year old female in clinic today to discuss treatment of the following conditions through diet and lifestyle modification and weight loss:  1. Class 3 severe obesity due to excess calories with serious comorbidity and body mass index (BMI) of 40.0 to 44.9 in adult (H)    2. Benign essential hypertension    3. Binge eating    4. Metabolic syndrome X    5. Avitaminosis D    6. Prediabetes      The patient's weight loss result since the last visit was mixed based on weight stability.  She was off plan for a while since I last saw her but is doing better over the past few days.  Weight olya.  She has a niece that is supporting and encouraging her to exercise.     - unclear benefit from medications.  She fell off her meal plan without them recently.  For now, continue to take phentermine/topirmate.   - I anticipate she will be back on plan recently given her recent recognition of where she was deficient and how she has improved.     Follow-up: 6 weeks      Prediabetes  Due for labs.  Clarify DM vs prediabetes as diagnosis.  6 week follow-up.     Continue supplements.    Subjective  Patient presents for treatment of chronic, comorbid conditions using intensive therapeutic lifestyle interventions  "including nutrition, physical activity, and behavior management.   - successes/struggles: now doing better but she was of her meal plan recently. Portions were controlled but she was eating more starchy carbohydrates.  She gained 7 lbs over a long weekend with family.  Now back to nutritional restriction.  Clothes are fitting better.  Ring has been tighter. Now off pop and sugar.  Less starchy carbs.  \"Withdrawal\" for ~3 days.  Feeling better today.     - exercise plan: active on job.  Niece has been helping her be active.   - tracking/journaling: ad esther.  Nutritional restriction of carbs.   - hunger: improved this week.    - medication: benefits: helpful? side effects: none    No flowsheet data found.    Patient Active Problem List   Diagnosis     Class 3 severe obesity due to excess calories with serious comorbidity and body mass index (BMI) of 40.0 to 44.9 in adult (H)     Benign essential hypertension     Prediabetes     Metabolic syndrome X     Avitaminosis D     Binge eating       Current Outpatient Medications on File Prior to Visit   Medication Sig Dispense Refill     albuterol (PROAIR HFA;PROVENTIL HFA;VENTOLIN HFA) 90 mcg/actuation inhaler Inhale 2 puffs every 6 (six) hours as needed for wheezing. 1 each 0     metFORMIN (GLUCOPHAGE) 500 MG tablet Take 1 tablet (500 mg total) by mouth daily with breakfast. 30 tablet 11     phentermine 15 MG capsule Take 1 capsule (15 mg total) by mouth every morning. 90 capsule 0     topiramate (TOPAMAX) 25 MG tablet Take 2 tablets (50 mg total) by mouth daily. 180 tablet 1     No current facility-administered medications on file prior to visit.        Objective:  There were no vitals filed for this visit.  Initial Weight: 221 lbs  Weight (Patient Reported): 178 lb (80.7 kg)    There is no height or weight on file to calculate BMI.  No LMP recorded. Patient is premenopausal.  GENERAL: Healthy, alert and no distress  EYES: Eyes grossly normal to inspection. No discharge or " erythema, or obvious scleral/conjunctival abnormalities.  RESP: No audible wheeze, cough, or visible cyanosis.  No visible retractions or increased work of breathing.    SKIN: Visible skin clear. No significant rash, abnormal pigmentation or lesions.  NEURO: Cranial nerves grossly intact. Mentation and speech appropriate for age.  PSYCH: Mentation appears normal, affect normal/bright, judgement and insight intact, normal speech and appearance well-groomed      This note has been dictated using voice recognition software. Any grammatical or context distortions are unintentional and inherent to the software.

## 2021-06-15 NOTE — TELEPHONE ENCOUNTER
RN cannot approve Refill Request    RN can NOT refill this medication med is not covered by policy/route to provider. Last office visit: 8/12/2019 Abimael Walker MD Last Physical: Visit date not found Last MTM visit: Visit date not found Last visit same specialty: 8/12/2019 Abimael Walker MD.  Next visit within 3 mo: Visit date not found  Next physical within 3 mo: Visit date not found      Angelica Hedrick, [facility] Triage/Med Refill 2/11/2021    Requested Prescriptions   Pending Prescriptions Disp Refills     phentermine 15 MG capsule 90 capsule 0     Sig: Take 1 capsule (15 mg total) by mouth every morning.       There is no refill protocol information for this order

## 2021-06-16 PROBLEM — R73.03 PREDIABETES: Status: ACTIVE | Noted: 2019-06-03

## 2021-06-16 PROBLEM — I10 BENIGN ESSENTIAL HYPERTENSION: Status: ACTIVE | Noted: 2019-06-03

## 2021-06-16 PROBLEM — E88.810 METABOLIC SYNDROME X: Status: ACTIVE | Noted: 2019-06-03

## 2021-06-16 PROBLEM — E66.811 CLASS 1 OBESITY DUE TO EXCESS CALORIES WITH SERIOUS COMORBIDITY AND BODY MASS INDEX (BMI) OF 34.0 TO 34.9 IN ADULT: Status: ACTIVE | Noted: 2019-06-03

## 2021-06-16 PROBLEM — E55.9 AVITAMINOSIS D: Status: ACTIVE | Noted: 2019-06-05

## 2021-06-16 PROBLEM — E66.09 CLASS 1 OBESITY DUE TO EXCESS CALORIES WITH SERIOUS COMORBIDITY AND BODY MASS INDEX (BMI) OF 34.0 TO 34.9 IN ADULT: Status: ACTIVE | Noted: 2019-06-03

## 2021-06-16 PROBLEM — R63.2 BINGE EATING: Status: ACTIVE | Noted: 2020-06-01

## 2021-06-16 NOTE — PROGRESS NOTES
Assessment/Plan:   Cough  Throat pain/Nasal congestion  Pneumonia, left lower lobe   Likely a viral flu like or Covid like illness with chills, cough, URI, myalgia, diarrhea, malaise and fatigue. Also possible LLL pneumonia. Radiology thought the infiltrate in LLL on CXR could be more likely atelectasis though with chills, maliase and productive cough for 5 days, I think a pneumonia is also likely based on the appearance. The pneumonia could be viral in nature though did not have the diffuse appearance of a viral pneumonia.   RST and flu test negative.   Will treat with azithromycin and tessalon perles, albuterol inhaler with spacer.   Await covid test.   No work tomorrow. Note written.   I discussed red flag symptoms, return precautions to clinic/ER and follow up care with patient/parent.  Expected clinical course, symptomatic cares advised. Questions answered. Patient/parent amenable with plan.  - Symptomatic COVID-19 Virus (CORONAVIRUS) PCR; Future  - Rapid Strep A Screen-Throat swab  - Symptomatic COVID-19 Virus (CORONAVIRUS) PCR  - Group A Strep PCR Throat Swab  - Influenza A/B Rapid Test- Nasal Swab  - XR Chest 2 Views  - Spacer W/O Mask  - benzonatate (TESSALON PERLES) 100 MG capsule; Take 1 capsule (100 mg total) by mouth 3 (three) times a day as needed for cough.  Dispense: 30 capsule; Refill: 0  - azithromycin (ZITHROMAX Z-SURY) 250 MG tablet; 2 tabs (500 mg ) day #1, then 1 tab (250 mg) days #2-5, total 5 days  Dispense: 6 tablet; Refill: 0    Fluids, rest, steam, nasal saline  Diet as tolerated, adequate fluids and electrolytes  BRAT diet - bananas, applesauce, rice, potatoes, pasta, toast, crackers - for diarrhea  Cough drops  Albuterol inhaler with spacer 2 puffs every 4 hours as needed for cough, wheeze, shortness of breath, chest tightness  mucinex or robitussin DM or delsym if needed for cough  Avoid decongestants and antihistamines as they can raise blood pressure  Tessalon perles as needed for  cough  No work tomorrow  Tylenol and ibuprofen if needed for pain or fever  May try heat or cold packs  Covid test is pending  Recheck if worse or no better.    Subjective:      Mana Bull is a 50 y.o. female who presents for evaluation of a respiratory infection. On Sunday evening, 4-5 days ago, she developed cough, chest congestion, nasal congestion and HA. On Monday she had worse cough and chest congestion associated with shortness of breath, feeling heavy to breathe and sinus pain. She felt the same on Tuesday. Yesterday (Wednesday) she felt warm and had off and on chills and sweats. No definite fever on the thermometer. Today some decreased cough but she is also achy and sore in her muscles all over, has HA and fatigue but no further sinus pain. She has had diarrhea and some abdominal cramping for 2 days, no N/V. Some decreased smell and taste noted this week. No leg swelling or rash.   She has felt a little wheezy off and on. She is an ex-smoker and notes that she has wheezed with respiratory infections in the past.   She has had the pfizer covid vaccine and the flu shot this year.   She lives with her mother and 2 nieces in her household and some of them have had this same illness already.   She has had pneumonia a year or two ago and states she feels just like that again this week.     No Known Allergies    Current Outpatient Medications on File Prior to Visit   Medication Sig Dispense Refill     melatonin 3 mg Tab tablet Take 3 mg by mouth at bedtime as needed.       menthol (COUGH DROPS) 10 mg Lozg by Mucous Membrane route.       metFORMIN (GLUCOPHAGE) 500 MG tablet Take 1 tablet (500 mg total) by mouth daily with breakfast. 30 tablet 11     topiramate (TOPAMAX) 25 MG tablet Take 2 tablets (50 mg total) by mouth daily. 180 tablet 1     albuterol (PROAIR HFA;PROVENTIL HFA;VENTOLIN HFA) 90 mcg/actuation inhaler Inhale 2 puffs every 6 (six) hours as needed for wheezing. 1 each 0     No current  facility-administered medications on file prior to visit.      Patient Active Problem List   Diagnosis     Class 3 severe obesity due to excess calories with serious comorbidity and body mass index (BMI) of 40.0 to 44.9 in adult (H)     Benign essential hypertension     Prediabetes     Metabolic syndrome X     Avitaminosis D     Binge eating       Objective:     BP (!) 143/91   Pulse 75   Temp 99  F (37.2  C)   Resp 19   SpO2 95%   Breastfeeding No     Physical  General Appearance: Alert, cooperative, no distress, low energy and mildly ill appearing. AVSS, though BP slightly high, T99, sats 95%  Head: Normocephalic, without obvious abnormality, atraumatic  Eyes: Conjunctivae are normal.   Ears: Normal TMs and external ear canals, both ears  Nose: congestion.  Throat: Throat is red posteriorly.  No exudate.  No significant lesions  Neck: mildly tender adenopathy and tender SCM and cervical muscles on the right side were tender and sore. ROM okay at the neck. No anterior masses or definite posterior adenopathy.   Lungs: Clear to auscultation bilaterally with coarse breath sounds lower lobes, respirations unlabored. No wheezes  Heart: Regular rate and rhythm  Abdomen: Soft, non-tender  Extremities: No lower extremity edema, no muscles pain or joint swelling  Skin: Skin color, texture, turgor normal, no rashes or lesions  Psychiatric: Patient has a normal mood and affect.       CXR obtained and viewed by me showed a LLL infiltrate.     Xr Chest 2 Views    Result Date: 4/15/2021  EXAM: XR CHEST 2 VIEWS LOCATION: [facility] DATE/TIME: 4/15/2021 7:15 PM INDICATION: Cough and chills for 5 days. COMPARISON: 2/27/2020.     There are some linear opacities at the left lung base where there is some more ill-defined opacity on the prior examination. Today this appears more likely atelectasis or scar. No alejandro consolidation or effusion. Cardiac and mediastinal silhouettes and osseous structures appear  unremarkable.      Recent Results (from the past 24 hour(s))   Influenza A/B Rapid Test- Nasal Swab    Specimen: Nasal Swab; Nasopharyngeal (Inpt/ED) or Nasal Mucosa (Outpt)   Result Value Ref Range    Influenza  A, Rapid Antigen No Influenza A antigen detected No Influenza A antigen detected    Influenza B, Rapid Antigen No Influenza B antigen detected No Influenza B antigen detected   Rapid Strep A Screen-Throat swab    Specimen: Throat   Result Value Ref Range    Rapid Strep A Antigen No Group A Strep detected, presumptive negative No Group A Strep detected, presumptive negative

## 2021-06-16 NOTE — PATIENT INSTRUCTIONS - HE
"Fluids, rest, steam, nasal saline  Diet as tolerated, adequate fluids and electrolytes  BRAT diet - bananas, applesauce, rice, potatoes, pasta, toast, crackers - for diarrhea  Cough drops  Albuterol inhaler with spacer 2 puffs every 4 hours as needed for cough, wheeze, shortness of breath, chest tightness  mucinex or robitussin DM or delsym if needed for cough  Avoid decongestants and antihistamines as they can raise blood pressure  Tessalon perles as needed for cough  No work tomorrow  Tylenol and ibuprofen if needed for pain or fever  May try heat or cold packs  Covid test is pending  Recheck if worse or no better.     Discharge Instructions for COVID-19 Patients  You have--or may have--COVID-19. Please follow the instructions listed below.   If you have a weakened immune system, discuss with your doctor any other actions you need to take.  How can I protect others?  If you have symptoms (fever, cough, body aches or trouble breathing):    Stay home and away from others (self-isolate) until:  ? Your other symptoms have resolved (gotten better). And   ? You've had no fever--and no medicine that reduces fever--for 1 full day (24 hours). And   ? At least 10 days have passed since your symptoms started. (You may need to wait 20 days. Follow the advice of your care team.)  If you don't show symptoms, but testing showed that you have COVID-19:    Stay home and away from others (self-isolate) until at least 10 days have passed since the date of your first positive COVID-19 test.  During this time    Stay in your own room, even for meals. Use your own bathroom if you can.    Stay away from others in your home. No hugging, kissing or shaking hands. No visitors.    Don't go to work, school or anywhere else.    Clean \"high touch\" surfaces often (doorknobs, counters, handles). Use household cleaning spray or wipes.    You'll find a full list of  on the EPA website: " www.epa.gov/pesticide-registration/list-n-disinfectants-use-against-sars-cov-2.    Cover your mouth and nose with a mask or other face covering to avoid spreading germs.    Wash your hands and face often. Use soap and water.    Caregivers in these groups are at risk for severe illness due to COVID-19:  ? People 65 years and older  ? People who live in a nursing home or long-term care facility  ? People with chronic disease (lung, heart, cancer, diabetes, kidney, liver, immunologic)  ? People who have a weakened immune system, including those who:    Are in cancer treatment    Take medicine that weakens the immune system, such as corticosteroids    Had a bone marrow or organ transplant    Have an immune deficiency    Have poorly controlled HIV or AIDS    Are obese (body mass index of 40 or higher)    Smoke regularly    Caregivers should wear gloves while washing dishes, handling laundry and cleaning bedrooms and bathrooms.    Use caution when washing and drying laundry: Don't shake dirty laundry and use the warmest water setting that you can.    For more tips on managing your health at home, go to www.cdc.gov/coronavirus/2019-ncov/downloads/10Things.pdf.  How can I take care of myself at home?  1. Get lots of rest. Drink extra fluids (unless a doctor has told you not to).  2. Take Tylenol (acetaminophen) for fever or pain. If you have liver or kidney problems, ask your family doctor if it's okay to take Tylenol.   Adults can take either:   ? 650 mg (two 325 mg pills) every 4 to 6 hours, or   ? 1,000 mg (two 500 mg pills) every 8 hours as needed.  ? Note: Don't take more than 3,000 mg in one day. Acetaminophen is found in many medicines (both prescribed and over-the-counter medicines). Read all labels to be sure you don't take too much.   For children, check the Tylenol bottle for the right dose. The dose is based on the child's age or weight.  3. If you have other health problems (like cancer, heart failure, an organ  transplant or severe kidney disease): Call your specialty clinic if you don't feel better in the next 2 days.  4. Know when to call 911. Emergency warning signs include:  ? Trouble breathing or shortness of breath  ? Pain or pressure in the chest that doesn't go away  ? Feeling confused like you haven't felt before, or not being able to wake up  ? Bluish-colored lips or face  5. Your doctor may have prescribed a blood thinner medicine. Follow their instructions.  Where can I get more information?    [facility] - About COVID-19:   [email or web address]    CDC - What to Do If You're Sick: www.cdc.gov/coronavirus/2019-ncov/about/steps-when-sick.html    CDC - Ending Home Isolation: www.cdc.gov/coronavirus/2019-ncov/hcp/disposition-in-home-patients.html    [facility] - Caring for Someone: www.cdc.gov/coronavirus/2019-ncov/if-you-are-sick/care-for-someone.html    [facility] - Interim Guidance for Hospital Discharge to Home: www.[email or web address]/diseases/coronavirus/hcp/hospdischarge.pdf    Below are the COVID-19 hotlines at the [facility] ([facility]). Interpreters are available.  ? For health questions: Call 808-869-2986 or 1-775.513.9535 (7 a.m. to 7 p.m.)  ? For questions about schools and childcare: Call 323-935-0137 or 1-748.396.7505 (7 a.m. to 7 p.m.)    For informational purposes only. Not to replace the advice of your health care provider. Clinically reviewed by Dr. Jeffry Lopez.   Copyright   2020 [employer]. All rights reserved. [employer] [id number] - REV 01/05/21.

## 2021-06-17 NOTE — PROGRESS NOTES
Assessment and Plan:     Class 1 obesity due to excess calories with serious comorbidity and body mass index (BMI) of 34.0 to 34.9 in adult  50 y.o. year old female in clinic today to discuss treatment of the following conditions through diet and lifestyle modification and weight loss:  1. Class 1 obesity due to excess calories with serious comorbidity and body mass index (BMI) of 34.0 to 34.9 in adult    2. Benign essential hypertension    3. Prediabetes    4. Metabolic syndrome X    5. Avitaminosis D      The patient's weight loss result since the last visit was mixed based on weight regain while she has recognized worsening dietary patterns and moved to correct over the past couple of weeks.  We will perform fasting lab work today.  We discussed whether or not we should consider her diabetic or prediabetic and I am inclined to recommend that going forward we should consider as having mild diabetes.  We will clarify with today's hemoglobin A1c and other labs.  Cravings have been heavy.  She had been doing well on the decreased dose of phentermine.  We will increase phentermine back up to 30 mg.  Liraglutide (Saxenda) is likely covered by insurance but we will hold off making this shift for now.  She will continue Metformin.  Follow-up in 2-3 months recommended.  Blood pressure well controlled.    Prediabetes  Anticipate hemoglobin A1c similar to her measurement from last year.  She is describing symptoms of hyperinsulinemia.  Given her hemoglobin A1c measurements from 2019 and 2020, I am inclined to think of her condition as being a type 2 diabetes.  If her LDL cholesterol is high we will plan to start a low-dose of rosuvastatin, aspirin and continued to work on medical weight loss.  Consider SGLT2 inhibitor or GLP-1 receptor agonist in addition to Metformin (which also could be increased).    Continue supplements.    31 minutes spent on the date of the encounter doing chart review, history and exam, documentation  "and further activities per the note    Subjective  Patient presents for treatment of chronic, comorbid conditions using intensive therapeutic lifestyle interventions including nutrition, physical activity, and behavior management.   - successes: none.     - struggles: \"eating what's not right.\"  Drinking pop again. 1-2 cans of coke per day.  She continues to drink lots of water.  More cravings. Co-workers order lunch.  More restaurant food.     - she eats lunch and dinner.  Some fasting including a 36 hour fast.  Planning 24 hour fast today.   - tracking/journaling: ad esther   - following nutritional plan: less than before.  Deviations from plan: daily   - hunger: cravings.     - medication: benefits: not sure. She wonders if she can go up to 30 mg of phentermine. side effects: none.    No flowsheet data found.    Patient Active Problem List   Diagnosis     Class 1 obesity due to excess calories with serious comorbidity and body mass index (BMI) of 34.0 to 34.9 in adult     Benign essential hypertension     Prediabetes     Metabolic syndrome X     Avitaminosis D     Binge eating       Current Outpatient Medications on File Prior to Visit   Medication Sig Dispense Refill     albuterol (PROAIR HFA;PROVENTIL HFA;VENTOLIN HFA) 90 mcg/actuation inhaler Inhale 2 puffs every 6 (six) hours as needed for wheezing. 1 each 0     melatonin 3 mg Tab tablet Take 3 mg by mouth at bedtime as needed.       metFORMIN (GLUCOPHAGE) 500 MG tablet Take 1 tablet (500 mg total) by mouth daily with breakfast. 30 tablet 11     [DISCONTINUED] phentermine 15 MG capsule Take 1 capsule (15 mg total) by mouth every morning. 90 capsule 0     [DISCONTINUED] topiramate (TOPAMAX) 25 MG tablet Take 2 tablets (50 mg total) by mouth daily. 180 tablet 1     [DISCONTINUED] benzonatate (TESSALON PERLES) 100 MG capsule Take 1 capsule (100 mg total) by mouth 3 (three) times a day as needed for cough. 30 capsule 0     [DISCONTINUED] menthol (COUGH DROPS) 10 mg " Mercedg by Mucous Membrane route.       No current facility-administered medications on file prior to visit.        Objective:  Vitals:    05/03/21 1001   BP: 116/60   Pulse: 92     Initial Weight: 221 lbs  Weight: 188 lb (85.3 kg)  Weight loss from initial: 33  % Weight loss: 14.93 %    Body mass index is 34.95 kg/m .  Patient's last menstrual period was 09/01/2020 (approximate).  GENERAL: Healthy, alert and no distress  EYES: Eyes grossly normal to inspection. No discharge or erythema, or obvious scleral/conjunctival abnormalities.  RESP: No audible wheeze, cough, or visible cyanosis.  No visible retractions or increased work of breathing.    SKIN: Visible skin clear. No significant rash, abnormal pigmentation or lesions.  NEURO: Cranial nerves grossly intact. Mentation and speech appropriate for age.  PSYCH: Mentation appears normal, affect normal/bright, judgement and insight intact, normal speech and appearance well-groomed      This note has been dictated using voice recognition software. Any grammatical or context distortions are unintentional and inherent to the software.

## 2021-06-18 NOTE — PATIENT INSTRUCTIONS - HE
Patient Instructions by Abimael Walker MD at 9/14/2020 11:00 AM     Author: Abimael Walker MD Service: -- Author Type: Physician    Filed: 9/14/2020 11:26 AM Encounter Date: 9/14/2020 Status: Addendum    : Abimael Walker MD (Physician)    Related Notes: Original Note by Abimael Walker MD (Physician) filed at 9/14/2020 11:22 AM        - Google: sleep hygiene (American Sleep Association)   - Try Melatonin  (5 mg 1-2 hours before bedtime)   - Cognitive Behavioral Therapy for Insomnia    Sleep Hygiene Tips - Research & Treatments  Getting good sleep is important in maintaining health. There are several things that you can do to promote good sleep and sleep hygiene, and ultimately Get Better Sleep.  What is sleep hygiene?  Sleep hygiene is defined as behaviors that one can do to help promote good sleep using behavioral interventions.  Sleep hygiene tips:  Maintain a regular sleep routine    Go to bed at the same time. Wake up at the same time. Ideally, your schedule will remain the same (+/- 20 minutes) every night of the week.  Avoid naps if possible    Naps decrease the Sleep Debt that is so necessary for easy sleep onset.     Each of us needs a certain amount of sleep per 24-hour period. We need that amount, and we dont need more than that.     When we take naps, it decreases the amount of sleep that we need the next night - which may cause sleep fragmentation and difficulty initiating sleep, and may lead to insomnia.  Dont stay in bed awake for more than 5-10 minutes.    If you find your mind racing, or worrying about not being able to sleep during the middle of the night, get out of bed, and sit in a chair in the dark. Do your mind racing in the chair until you are sleepy, then return to bed. No TV or internet during these periods! That will just stimulate you more than desired.    If this happens several times during the night, that is OK. Just maintain your regular wake time, and try to avoid  naps.  Dont watch TV or read in bed.    When you watch TV or read in bed, you associate the bed with wakefulness.     The bed is reserved for two things - sleep and hanky panky.  Drink caffeinated drinks with caution    The effects of caffeine may last for several hours after ingestion. Caffeine can fragment sleep, and cause difficulty initiating sleep. If you drink caffeine, use it only before noon.     Remember that soda and tea contain caffeine as well.       Avoid inappropriate substances that interfere with sleep    Cigarettes, alcohol, and over-the-counter medications may cause fragmented sleep.  Exercise regularly    Exercise before 2 pm every day. Exercise promotes continuous sleep.     Avoid rigorous exercise before bedtime. Rigorous exercise circulates endorphins into the body which may cause difficulty initiating sleep.   exercise and sleep  Have a quiet, comfortable bedroom    Set your bedroom thermostat at a comfortable temperature. Generally, a little cooler is better than a little warmer.     Turn off the TV and other extraneous noise that may disrupt sleep. Background white noise like a fan is OK.     If your pets awaken you, keep them outside the bedroom.     Your bedroom should be dark. Turn off bright lights.     Have a comfortable mattress.  If you are a clock watcher at night, hide the clock.    Have a comfortable pre-bedtime routine    A warm bath, shower     Meditation, or quiet time  Some who are struggling with sleep regularly find it helpful to print out these recommendations and read them regularly. If you accidentally miss some of recommendations, or have a bad night, do not fret. By following these sleep hygiene recommendations, you will help yourself to get into a routine that promotes good sleep opportunities.

## 2021-06-18 NOTE — PATIENT INSTRUCTIONS - HE
Patient Instructions by Abimael Walker MD at 5/3/2021 10:20 AM     Author: Abimael Walker MD Service: -- Author Type: Physician    Filed: 5/3/2021 10:44 AM Encounter Date: 5/3/2021 Status: Signed    : Abimael Walker MD (Physician)       Source: CDC website:

## 2021-06-18 NOTE — PATIENT INSTRUCTIONS - HE
Patient Instructions by Abimael Walker MD at 10/19/2020 11:40 AM     Author: Abimael Walker MD Service: -- Author Type: Physician    Filed: 10/19/2020 12:22 PM Encounter Date: 10/19/2020 Status: Signed    : Abimael Walker MD (Physician)        - Google: sleep hygiene (American Sleep Association)   - Try Melatonin  (5 mg 1-2 hours before bedtime)    Sleep Hygiene Tips - Research & Treatments  Getting good sleep is important in maintaining health. There are several things that you can do to promote good sleep and sleep hygiene, and ultimately Get Better Sleep.  What is sleep hygiene?  Sleep hygiene is defined as behaviors that one can do to help promote good sleep using behavioral interventions.  Sleep hygiene tips:  Maintain a regular sleep routine    Go to bed at the same time. Wake up at the same time. Ideally, your schedule will remain the same (+/- 20 minutes) every night of the week.  Avoid naps if possible    Naps decrease the Sleep Debt that is so necessary for easy sleep onset.     Each of us needs a certain amount of sleep per 24-hour period. We need that amount, and we dont need more than that.     When we take naps, it decreases the amount of sleep that we need the next night - which may cause sleep fragmentation and difficulty initiating sleep, and may lead to insomnia.  Dont stay in bed awake for more than 5-10 minutes.    If you find your mind racing, or worrying about not being able to sleep during the middle of the night, get out of bed, and sit in a chair in the dark. Do your mind racing in the chair until you are sleepy, then return to bed. No TV or internet during these periods! That will just stimulate you more than desired.    If this happens several times during the night, that is OK. Just maintain your regular wake time, and try to avoid naps.  Dont watch TV or read in bed.    When you watch TV or read in bed, you associate the bed with wakefulness.     The bed is reserved for  two things - sleep and hanky panky.  Drink caffeinated drinks with caution    The effects of caffeine may last for several hours after ingestion. Caffeine can fragment sleep, and cause difficulty initiating sleep. If you drink caffeine, use it only before noon.     Remember that soda and tea contain caffeine as well.       Avoid inappropriate substances that interfere with sleep    Cigarettes, alcohol, and over-the-counter medications may cause fragmented sleep.  Exercise regularly    Exercise before 2 pm every day. Exercise promotes continuous sleep.     Avoid rigorous exercise before bedtime. Rigorous exercise circulates endorphins into the body which may cause difficulty initiating sleep.   exercise and sleep  Have a quiet, comfortable bedroom    Set your bedroom thermostat at a comfortable temperature. Generally, a little cooler is better than a little warmer.     Turn off the TV and other extraneous noise that may disrupt sleep. Background white noise like a fan is OK.     If your pets awaken you, keep them outside the bedroom.     Your bedroom should be dark. Turn off bright lights.     Have a comfortable mattress.  If you are a clock watcher at night, hide the clock.    Have a comfortable pre-bedtime routine    A warm bath, shower     Meditation, or quiet time  Some who are struggling with sleep regularly find it helpful to print out these recommendations and read them regularly. If you accidentally miss some of recommendations, or have a bad night, do not fret. By following these sleep hygiene recommendations, you will help yourself to get into a routine that promotes good sleep opportunities.

## 2021-06-19 NOTE — LETTER
Letter by Jerrica Canales MD at      Author: Jerrica Canales MD Service: -- Author Type: --    Filed:  Encounter Date: 5/6/2019 Status: (Other)         5/6/2019      Mana Bull  [address] [place] [address]      Dear Leesa Adair and thank you for your interest in the bariatric program at [facility]!     Your appointment is scheduled at [address] [facility] on Monday May 20, 2019 at 11:00 AM with DR. Canales We ask that you arrive 45 minutes prior to your appointment time to complete your registration.   We strive to avoid clinic delays for our patients, so patients arriving late will need to reschedule.    Your first appointment will take about two hours.     In preparation for this appointment you will need to bring the following:      Your insurance card and photo identification    Completed health history form (enclosed).  Please make sure that you bring this form with you completed. There will not be time to complete this in the office so we will need to reschedule if you forget to do this.     All your medications in their original containers, including over-the-counter medications.    Any lab results that you have had done within the last 6 months.     If you are interested in our surgical program; call your insurance company prior to this visit, to verify that your specific insurance plan covers Weight-Loss Surgery and what your out-of-pocket costs may be.     Your appointment has been scheduled at [address] [facility]    If you find yourself unable to keep this appointment, please call us to reschedule at your earliest convenience so we can accommodate other patients.    We are excited that you have chosen our program and look forward to serving you!    Sincerely,  The [facility] Team

## 2021-06-19 NOTE — LETTER
Letter by Abimael Walker MD at      Author: Abimael Walker MD Service: -- Author Type: --    Filed:  Encounter Date: 6/5/2019 Status: (Other)         Mana Bull  [address] [place] [id number]             June 5, 2019         Dear Ms. Bull,    Below are the results from your recent visit:    Resulted Orders   HM2(CBC w/o Differential)   Result Value Ref Range    WBC 9.0 4.0 - 11.0 thou/uL    RBC 5.22 3.80 - 5.40 mill/uL    Hemoglobin 14.4 12.0 - 16.0 g/dL    Hematocrit 43.9 35.0 - 47.0 %    MCV 84 80 - 100 fL    MCH 27.6 27.0 - 34.0 pg    MCHC 32.8 32.0 - 36.0 g/dL    RDW 14.3 11.0 - 14.5 %    Platelets 250 140 - 440 thou/uL    MPV 9.5 7.0 - 10.0 fL   Comprehensive Metabolic Panel   Result Value Ref Range    Sodium 137 136 - 145 mmol/L    Potassium 4.3 3.5 - 5.0 mmol/L    Chloride 107 98 - 107 mmol/L    CO2 21 (L) 22 - 31 mmol/L    Anion Gap, Calculation 9 5 - 18 mmol/L    Glucose 107 70 - 125 mg/dL    BUN 8 8 - 22 mg/dL    Creatinine 0.71 0.60 - 1.10 mg/dL    GFR MDRD Af Amer >60 >60 mL/min/1.73m2    GFR MDRD Non Af Amer >60 >60 mL/min/1.73m2    Bilirubin, Total 0.2 0.0 - 1.0 mg/dL    Calcium 9.1 8.5 - 10.5 mg/dL    Protein, Total 6.4 6.0 - 8.0 g/dL    Albumin 3.5 3.5 - 5.0 g/dL    Alkaline Phosphatase 95 45 - 120 U/L    AST 11 0 - 40 U/L    ALT <9 0 - 45 U/L    Narrative    Fasting Glucose reference range is 70-99 mg/dL per  American Diabetes Association (ADA) guidelines.   Glycosylated Hemoglobin A1c   Result Value Ref Range    Hemoglobin A1c 6.8 (H) 3.5 - 6.0 %   Lipid Cascade   Result Value Ref Range    Cholesterol 225 (H) <=199 mg/dL    Triglycerides 98 <=149 mg/dL    HDL Cholesterol 40 (L) >=50 mg/dL    LDL Calculated 165 (H) <=129 mg/dL    Patient Fasting > 8hrs? Yes    Thyroid Cascade   Result Value Ref Range    TSH 1.43 0.30 - 5.00 uIU/mL   Vitamin D, Total (25-Hydroxy)   Result Value Ref Range    Vitamin D, Total (25-Hydroxy) 7.7 (L) 30.0 - 80.0 ng/mL    Narrative    Deficiency  <10.0 ng/mL  Insufficiency 10.0-29.9 ng/mL  Sufficiency 30.0-80.0 ng/mL  Toxicity (possible) >100.0 ng/mL     Your vitamin D level was moderately low.  I recommend replacement.  This takes the form of 12 weeks of medication taken weekly. This medication was sent to your pharmacy.  While you are taking vitamin D replacement, you should not be taking daily supplementation.  After conclusion of the replacement period, you should resume (or start) taking 2000 international units of vitamin D daily.      Your comprehensive metabolic panel was normal.  This shows your kidney function, electrolytes, liver function (for comprehensive only).     - your electrolytes were normal.     - your kidney function was normal.     - your liver functions was normal.      Your thyroid function as measured by a TSH was normal.    Your hemogram (cell counts such as hemoglobin, white blood cells and platelets) was normal.    Lastly, as discussed during our clinic encounter, your blood sugar levels are elevated and are currently in the range that is consistent with diabetes.  The plan that we developed should address be a great first step towards addressing blood sugar concerns and the potential of having diabetes.  My opinion, one of the main reasons to make the changes that you are seeking through our clinic visit earlier this week is to avoid diabetes.    Please call with questions or contact us using evols[employer].    Sincerely,        Electronically signed by Abimael Walker MD

## 2021-06-21 NOTE — LETTER
Letter by Deborah Guerrero MD at      Author: Deborah Guerrero MD Service: -- Author Type: --    Filed:  Encounter Date: 4/15/2021 Status: (Other)         April 15, 2021     Patient: Mana Bull   YOB: 1970   Date of Visit: 4/15/2021       To Whom it May Concern:    Mana Bull was seen in my clinic on 4/15/2021. She should not work tomorrow 4/16/21. A Covid-19 test is pending and further time off will be determined based on that result as well as improvement in cough and other symptoms.     If you have any questions or concerns, please don't hesitate to call.    Sincerely,         Electronically signed by Deborah Gupta MD

## 2021-07-03 NOTE — ADDENDUM NOTE
Addendum Note by Abimael Barnett MD at 6/3/2019 10:20 AM     Author: Abimael Barnett MD Service: -- Author Type: Physician    Filed: 6/5/2019  7:16 AM Encounter Date: 6/3/2019 Status: Signed    : Abimael Barnett MD (Physician)    Addended by: ABIMAEL BARNETT on: 6/5/2019 07:16 AM        Modules accepted: Orders

## 2021-07-28 ENCOUNTER — VIRTUAL VISIT (OUTPATIENT)
Dept: FAMILY MEDICINE | Facility: CLINIC | Age: 51
End: 2021-07-28
Payer: COMMERCIAL

## 2021-07-28 VITALS — WEIGHT: 175 LBS | BODY MASS INDEX: 32.53 KG/M2

## 2021-07-28 DIAGNOSIS — E55.9 AVITAMINOSIS D: ICD-10-CM

## 2021-07-28 DIAGNOSIS — E66.811 CLASS 1 OBESITY DUE TO EXCESS CALORIES WITH SERIOUS COMORBIDITY AND BODY MASS INDEX (BMI) OF 34.0 TO 34.9 IN ADULT: ICD-10-CM

## 2021-07-28 DIAGNOSIS — E88.810 METABOLIC SYNDROME X: ICD-10-CM

## 2021-07-28 DIAGNOSIS — R63.2 BINGE EATING: ICD-10-CM

## 2021-07-28 DIAGNOSIS — E66.09 CLASS 1 OBESITY DUE TO EXCESS CALORIES WITH SERIOUS COMORBIDITY AND BODY MASS INDEX (BMI) OF 34.0 TO 34.9 IN ADULT: ICD-10-CM

## 2021-07-28 PROCEDURE — 99214 OFFICE O/P EST MOD 30 MIN: CPT | Mod: 95 | Performed by: FAMILY MEDICINE

## 2021-07-28 RX ORDER — PHENTERMINE HYDROCHLORIDE 30 MG/1
30 CAPSULE ORAL EVERY MORNING
COMMUNITY
End: 2021-09-13

## 2021-07-28 RX ORDER — METFORMIN HCL 500 MG
500 TABLET, EXTENDED RELEASE 24 HR ORAL DAILY
Qty: 90 TABLET | Refills: 1 | Status: SHIPPED | OUTPATIENT
Start: 2021-07-28 | End: 2023-05-22

## 2021-07-28 RX ORDER — TOPIRAMATE 25 MG/1
50 TABLET, FILM COATED ORAL DAILY
Qty: 180 TABLET | Refills: 1 | Status: SHIPPED | OUTPATIENT
Start: 2021-07-28 | End: 2023-05-22

## 2021-07-28 ASSESSMENT — ENCOUNTER SYMPTOMS: CONSTITUTIONAL NEGATIVE: 1

## 2021-07-28 NOTE — ASSESSMENT & PLAN NOTE
The patient has lost weight despite her increasing struggles with alcoholism and the poisonous dynamic between her and her family.  Comorbidities include hypertension, prediabetes, metabolic syndrome.  While she is participating in inpatient treatment, anticipate she will not be offered phentermine.  We discussed implications with appetite.  I suggest that she utilize the dietary support that is available at the facility that she is going to be staying with.  I suggested that she focus on eating protein at every meal.  If she struggling, I also suggested that she request naltrexone which would also serve the purpose of suppressing cravings for alcohol as well.

## 2021-07-28 NOTE — PROGRESS NOTES
Type of service:  Video Visit    Mana Bull is a 50 year old female who is being evaluated via a billable video visit.      How would you like to obtain your AVS? MyChart  If dropped from the video visit, the video invitation should be resent by: Text to cell phone: [phone]  Will anyone else be joining your video visit? No    Video Start Time: 8:29 AM  Video End Time (time video stopped): 8:54 AM  Originating Location (pt. Location): Home  Distant Location (provider location):  [facility]   Platform used for Video Visit: [facility]    Assessment/Plan:    Class 1 obesity due to excess calories with serious comorbidity and body mass index (BMI) of 34.0 to 34.9 in adult  The patient has lost weight despite her increasing struggles with alcoholism and the poisonous dynamic between her and her family.  Comorbidities include hypertension, prediabetes, metabolic syndrome.  While she is participating in inpatient treatment, anticipate she will not be offered phentermine.  We discussed implications with appetite.  I suggest that she utilize the dietary support that is available at the facility that she is going to be staying with.  I suggested that she focus on eating protein at every meal.  If she struggling, I also suggested that she request naltrexone which would also serve the purpose of suppressing cravings for alcohol as well.    Alcoholism /alcohol abuse (H)  This patient is previously done inpatient treatment with some success.  Recently, out of despair and as a result of poisonous dynamics between herself and her family she has been drinking excessive amounts of vodka on the weekends.  She has sought out support and will be starting inpatient chemical dependency treatment at [facility] tomorrow.  Duration of that therapy is to be determined.  She has not had recent laboratory testing.  If she needs us to complete FMLA paperwork we will do so.  She is aware that they will  "take her off of phentermine as it is a controlled substance.  She will otherwise continue her vacations.  Consider naltrexone.  She does not have a history of abuse of stimulating medicine such as phentermine and I believe it would be reasonable to resume this medication after she completes her therapy.      Return for Weight loss follow-up visit (after treatment).    Abimael Walker MD  _______________________________    Chief Complaint   Patient presents with     Forms     Subjective: Mana Bull is a 50 year old year old female who returns to clinic for the following chronic complaints/concerns:     Alcohol concerns   - history of alcohol in the past   - on FMLA.  Struggles with depression and alcoholism in the past.  \"Break down\" last week.     - weekend drinking.  1.5 quarts of vodka.  No consumption during the week.     - she completed treatment a couple of years ago which included a sober living set-up.  She felt family pressure to work and help pay bills. She says that she \"feels trapped\" with her family.  She feels like she pays for her family to live in her house.    - she starts [employer] tomorrow.  A few weeks ago she tried to seek help at [facility] but could not because of insurance concerns.      Review of Systems   Constitutional: Negative.         Review of systems negative except as noted in the HPI.   All other systems reviewed and are negative.       Reviewed history:  Allergies   Problems  Med Hx  Surg Hx  Fam Hx  Soc Hx        Objective:    weight is 79.4 kg (175 lb).   Physical Exam  Nursing note reviewed.   Constitutional:       General: She is not in acute distress.     Appearance: Normal appearance. She is not ill-appearing.   HENT:      Head: Normocephalic and atraumatic.   Eyes:      Extraocular Movements: Extraocular movements intact.      Conjunctiva/sclera: Conjunctivae normal.   Pulmonary:      Effort: Pulmonary effort is normal.   Neurological:      Mental Status: She " is alert and oriented to person, place, and time.   Psychiatric:         Attention and Perception: Attention normal.         Mood and Affect: Mood is anxious. Affect is angry and tearful.         Speech: Speech normal.         Behavior: Behavior is not aggressive.         Thought Content: Thought content normal. Thought content does not include suicidal ideation. Thought content does not include suicidal plan.         Cognition and Memory: Cognition normal.         Judgment: Judgment normal.      Comments: Patient is upset as she describes the dynamics between her and her family.        No flowsheet data found.  No flowsheet data found.  ACT Total Scores 6/1/2020   ACT TOTAL SCORE (Goal Greater than or Equal to 20) 24   In the past 12 months, how many times did you visit the emergency room for your asthma without being admitted to the hospital? 0   In the past 12 months, how many times were you hospitalized overnight because of your asthma? 0     No flowsheet data found.  No results found for this or any previous visit (from the past 48 hour(s)).  No results found for this visit on 07/28/21.    This note has been dictated using voice recognition software. Any grammatical or context distortions are unintentional and inherent to the software

## 2021-07-28 NOTE — ASSESSMENT & PLAN NOTE
This patient is previously done inpatient treatment with some success.  Recently, out of despair and as a result of poisonous dynamics between herself and her family she has been drinking excessive amounts of vodka on the weekends.  She has sought out support and will be starting inpatient chemical dependency treatment at [facility] tomorrow.  Duration of that therapy is to be determined.  She has not had recent laboratory testing.  If she needs us to complete [facility] paperwork we will do so.  She is aware that they will take her off of phentermine as it is a controlled substance.  She will otherwise continue her vacations.  Consider naltrexone.  She does not have a history of abuse of stimulating medicine such as phentermine and I believe it would be reasonable to resume this medication after she completes her therapy.

## 2021-09-01 ENCOUNTER — TELEPHONE (OUTPATIENT)
Dept: FAMILY MEDICINE | Facility: CLINIC | Age: 51
End: 2021-09-01

## 2021-09-01 NOTE — TELEPHONE ENCOUNTER
Left message to call back for: Zahraa  Information to relay to patient: see message below and relay/help schedule a follow up appointment ( 30 minutes please)     Regarding: Please call and offer appt  Please call patient and offer weight loss follow-up visit and post treatment visit.  She may still be in treatment.    Jordyn Loving LPN  9/1/2021  4:22 PM

## 2021-09-01 NOTE — TELEPHONE ENCOUNTER
----- Message from Abimael Walker MD sent at 9/1/2021  6:15 AM CDT -----  Regarding: Please call and offer appt  Please call patient and offer weight loss follow-up visit and post treatment visit.  She may still be in treatment.    -bruce

## 2021-09-03 NOTE — TELEPHONE ENCOUNTER
Left message to call back for: appt  Information to relay to patient:see below relay and schedule

## 2021-09-13 ENCOUNTER — VIRTUAL VISIT (OUTPATIENT)
Dept: FAMILY MEDICINE | Facility: CLINIC | Age: 51
End: 2021-09-13
Payer: COMMERCIAL

## 2021-09-13 VITALS — WEIGHT: 180 LBS | BODY MASS INDEX: 33.46 KG/M2

## 2021-09-13 DIAGNOSIS — E66.811 CLASS 1 OBESITY DUE TO EXCESS CALORIES WITH SERIOUS COMORBIDITY AND BODY MASS INDEX (BMI) OF 34.0 TO 34.9 IN ADULT: ICD-10-CM

## 2021-09-13 DIAGNOSIS — E66.09 CLASS 1 OBESITY DUE TO EXCESS CALORIES WITH SERIOUS COMORBIDITY AND BODY MASS INDEX (BMI) OF 34.0 TO 34.9 IN ADULT: ICD-10-CM

## 2021-09-13 DIAGNOSIS — I10 BENIGN ESSENTIAL HYPERTENSION: ICD-10-CM

## 2021-09-13 DIAGNOSIS — E88.810 METABOLIC SYNDROME X: ICD-10-CM

## 2021-09-13 DIAGNOSIS — R73.03 PREDIABETES: ICD-10-CM

## 2021-09-13 PROCEDURE — 99214 OFFICE O/P EST MOD 30 MIN: CPT | Mod: 95 | Performed by: FAMILY MEDICINE

## 2021-09-13 RX ORDER — PRAZOSIN HYDROCHLORIDE 2 MG/1
CAPSULE ORAL
COMMUNITY
Start: 2021-09-01 | End: 2021-09-13

## 2021-09-13 RX ORDER — PRAZOSIN HYDROCHLORIDE 2 MG/1
2 CAPSULE ORAL AT BEDTIME
Qty: 30 CAPSULE | Refills: 1 | Status: SHIPPED | OUTPATIENT
Start: 2021-09-13 | End: 2023-05-22

## 2021-09-13 RX ORDER — NALTREXONE HYDROCHLORIDE 50 MG/1
TABLET, FILM COATED ORAL
COMMUNITY
Start: 2021-09-01 | End: 2021-09-13

## 2021-09-13 RX ORDER — TRAZODONE HYDROCHLORIDE 50 MG/1
TABLET, FILM COATED ORAL
COMMUNITY
Start: 2021-08-29 | End: 2023-05-22

## 2021-09-13 RX ORDER — NALTREXONE HYDROCHLORIDE 50 MG/1
50 TABLET, FILM COATED ORAL DAILY
Qty: 30 TABLET | Refills: 1 | Status: SHIPPED | OUTPATIENT
Start: 2021-09-13 | End: 2023-05-22

## 2021-09-13 RX ORDER — TOPIRAMATE 50 MG/1
TABLET, FILM COATED ORAL
COMMUNITY
Start: 2021-08-09 | End: 2021-09-13

## 2021-09-13 RX ORDER — PHENTERMINE HYDROCHLORIDE 30 MG/1
30 CAPSULE ORAL EVERY MORNING
Qty: 90 CAPSULE | Refills: 0 | Status: SHIPPED | OUTPATIENT
Start: 2021-09-13 | End: 2023-05-22

## 2021-09-13 NOTE — ASSESSMENT & PLAN NOTE
50 year old year old female in clinic today to discuss treatment of the following conditions through diet and lifestyle modification and weight loss:  1. Alcoholism /alcohol abuse (H)    2. Class 1 obesity due to excess calories with serious comorbidity and body mass index (BMI) of 34.0 to 34.9 in adult    3. Metabolic syndrome X    4. Prediabetes    5. Benign essential hypertension      The patient's weight loss result since the last visit was successful based on weight stability.  She was off phentermine and her topiramate dose was adjusted upward.  She is looking forward to building her post-treatment lifestyle.   - continue phentermine/topiramate   - continue metforming   - cravings?  ultility of naltrexone for alcohol or carb cravings.  Continue for now.   Onset: today   Location / description: Spoke with pt who stated her temp was 105.9 oral   Pt was seen in the ER and diagnosed with UTI possible Pyelonephritis and started on antibiotics   Pt fells very nauseated and hot   Pt could not keep medications down  Pt is currently shaking  Recheck temp was 104.7-oral   Precipitating Factors: possible UTI  Pain Scale (1-10), 10 highest:   Associated Symptoms: nausea   What improves/worsens symptoms: none  Symptom specific medications: zofran   LMP : Patient's last menstrual period was 07/16/2020.  Are you pregnant or breast feeding: no  Recent Care: today ED pyelonephritis   Did the patient have a positive coronavirus screening?: No    PLAN:  Directed to Emergency Department    Patient/Caller agrees to follow recommendations.    Reason for Disposition  • Fever > 104 F (40 C)    Protocols used: FEVER-A-AH

## 2021-09-13 NOTE — PROGRESS NOTES
Type of service:  Video Visit    Mana Bull is a 50 year old female who is being evaluated via a billable video visit.      How would you like to obtain your AVS? MyChart  If dropped from the video visit, the video invitation should be resent by: Text to cell phone: [phone]  Will anyone else be joining your video visit? No    Video Start Time: 11:52 AM  Video End Time (time video stopped): 12:09 PM  Originating Location (pt. Location): Home  Distant Location (provider location):  [facility]   Platform used for Video Visit: [employer]    Assessment/Plan:    Class 1 obesity due to excess calories with serious comorbidity and body mass index (BMI) of 34.0 to 34.9 in adult  50 year old year old female in clinic today to discuss treatment of the following conditions through diet and lifestyle modification and weight loss:  1. Alcoholism /alcohol abuse (H)    2. Class 1 obesity due to excess calories with serious comorbidity and body mass index (BMI) of 34.0 to 34.9 in adult    3. Metabolic syndrome X    4. Prediabetes    5. Benign essential hypertension      The patient's weight loss result since the last visit was successful based on weight stability.  She was off phentermine and her topiramate dose was adjusted upward.  She is looking forward to building her post-treatment lifestyle.   - continue phentermine/topiramate   - continue metforming   - cravings?  ultility of naltrexone for alcohol or carb cravings.  Continue for now.    Alcoholism /alcohol abuse (H)  She is now 45 days without a drink of alcohol.  She completed 30 days and [place] [facility] Mck[facility].  We reviewed the medications that she is taking at the time of discharge which now include presence of naltrexone.  She had no cravings for alcohol.  I encouraged her to continue these medicines at least for now if she continues to transition.  She is participating in an intensive outpatient program as well as frequent AA groups.  She  "is working to move out of her family's home as they have been a source of stress in the past.  We discussed nutrition in the context of our ongoing conversation around medical weight loss.  We will continue phentermine and a low-dose of topiramate.  I like to see her in 6 weeks to review progress towards all of these lifestyle nutritional goals.  He will form that would be reasonable.  She can check in with us or meet with us sooner if she needs to.    Return in about 6 weeks (around 10/25/2021) for Weight loss follow-up visit.    Abimael Walker MD  _______________________________    Chief Complaint   Patient presents with     Clinic Care Coordination - Follow-up     treatment for alcohol      Subjective: Mana Bull is a 50 year old year old female who returns to clinic for the following chronic complaints/concerns:     Follow-up:   - 30 days at [place].  \"Very good.\"  She has been home for 15 days.   Some challenging days.  She works to stay busy.  She is 45 days sober.  Now back to work.  She participates in \"IOP.\"  [intensive outpatient therapy 4 days per week].  She does in-person and virtual AA meetings.  She has kept in touch with some of her group from treatment.   -  Family has been supportive.  They participated in the family program while she was in treatment.  The patient is in the process of moving out of her families home.  \"Something I need to do for myself.\"    - nightmares: on prazosin.  \"going okay.\" She had nightmares and anxiety when she was at treatment and working with the therapist.    - topiramate: dose was adjusted upward but it makes her tired.  She was not on phentermine in treatment.   - weight: appetite increased early during treatment.  Topiramate helped blunt her appetite    Review of Systems   Constitutional:        Review of systems negative except as noted in the HPI.   All other systems reviewed and are negative.       Reviewed history:  Allergies           "     Objective:    weight is 81.6 kg (180 lb).   Physical Exam  Nursing note reviewed.   Constitutional:       General: She is not in acute distress.     Appearance: Normal appearance. She is not ill-appearing.   HENT:      Head: Normocephalic and atraumatic.   Eyes:      Extraocular Movements: Extraocular movements intact.      Conjunctiva/sclera: Conjunctivae normal.   Pulmonary:      Effort: Pulmonary effort is normal.   Neurological:      Mental Status: She is alert and oriented to person, place, and time.   Psychiatric:         Attention and Perception: Attention normal.         Mood and Affect: Mood normal.         Speech: Speech normal.         Thought Content: Thought content normal.       No flowsheet data found.  No flowsheet data found.  ACT Total Scores 6/1/2020   ACT TOTAL SCORE (Goal Greater than or Equal to 20) 24   In the past 12 months, how many times did you visit the emergency room for your asthma without being admitted to the hospital? 0   In the past 12 months, how many times were you hospitalized overnight because of your asthma? 0     No flowsheet data found.  No results found for this or any previous visit (from the past 48 hour(s)).  No results found for this visit on 09/13/21.    This note has been dictated using voice recognition software. Any grammatical or context distortions are unintentional and inherent to the software

## 2021-09-13 NOTE — ASSESSMENT & PLAN NOTE
She is now 45 days without a drink of alcohol.  She completed 30 days and Isaac Mckenzie.  We reviewed the medications that she is taking at the time of discharge which now include presence of naltrexone.  She had no cravings for alcohol.  I encouraged her to continue these medicines at least for now if she continues to transition.  She is participating in an intensive outpatient program as well as frequent AA groups.  She is working to move out of her family's home as they have been a source of stress in the past.  We discussed nutrition in the context of our ongoing conversation around medical weight loss.  We will continue phentermine and a low-dose of topiramate.  I like to see her in 6 weeks to review progress towards all of these lifestyle nutritional goals.  He will form that would be reasonable.  She can check in with us or meet with us sooner if she needs to.

## 2021-09-19 ENCOUNTER — HEALTH MAINTENANCE LETTER (OUTPATIENT)
Age: 51
End: 2021-09-19

## 2021-12-09 ENCOUNTER — OFFICE VISIT (OUTPATIENT)
Dept: FAMILY MEDICINE | Facility: CLINIC | Age: 51
End: 2021-12-09
Payer: COMMERCIAL

## 2021-12-09 VITALS
SYSTOLIC BLOOD PRESSURE: 117 MMHG | TEMPERATURE: 98.1 F | HEART RATE: 80 BPM | WEIGHT: 200 LBS | RESPIRATION RATE: 15 BRPM | OXYGEN SATURATION: 97 % | BODY MASS INDEX: 37.18 KG/M2 | DIASTOLIC BLOOD PRESSURE: 82 MMHG

## 2021-12-09 DIAGNOSIS — R59.0 POSTAURICULAR ADENOPATHY: Primary | ICD-10-CM

## 2021-12-09 DIAGNOSIS — R05.9 COUGH: ICD-10-CM

## 2021-12-09 DIAGNOSIS — R09.81 NASAL CONGESTION: ICD-10-CM

## 2021-12-09 LAB
BASOPHILS # BLD AUTO: 0 10E3/UL (ref 0–0.2)
BASOPHILS NFR BLD AUTO: 0 %
DEPRECATED S PYO AG THROAT QL EIA: NEGATIVE
EOSINOPHIL # BLD AUTO: 0.2 10E3/UL (ref 0–0.7)
EOSINOPHIL NFR BLD AUTO: 2 %
ERYTHROCYTE [DISTWIDTH] IN BLOOD BY AUTOMATED COUNT: 14.8 % (ref 10–15)
HCT VFR BLD AUTO: 39.3 % (ref 35–47)
HGB BLD-MCNC: 13.8 G/DL (ref 11.7–15.7)
IMM GRANULOCYTES # BLD: 0 10E3/UL
IMM GRANULOCYTES NFR BLD: 0 %
LYMPHOCYTES # BLD AUTO: 3.3 10E3/UL (ref 0.8–5.3)
LYMPHOCYTES NFR BLD AUTO: 34 %
MCH RBC QN AUTO: 29.1 PG (ref 26.5–33)
MCHC RBC AUTO-ENTMCNC: 35.1 G/DL (ref 31.5–36.5)
MCV RBC AUTO: 83 FL (ref 78–100)
MONOCYTES # BLD AUTO: 0.9 10E3/UL (ref 0–1.3)
MONOCYTES NFR BLD AUTO: 10 %
NEUTROPHILS # BLD AUTO: 5 10E3/UL (ref 1.6–8.3)
NEUTROPHILS NFR BLD AUTO: 53 %
PLATELET # BLD AUTO: 253 10E3/UL (ref 150–450)
RBC # BLD AUTO: 4.75 10E6/UL (ref 3.8–5.2)
WBC # BLD AUTO: 9.5 10E3/UL (ref 4–11)

## 2021-12-09 PROCEDURE — U0003 INFECTIOUS AGENT DETECTION BY NUCLEIC ACID (DNA OR RNA); SEVERE ACUTE RESPIRATORY SYNDROME CORONAVIRUS 2 (SARS-COV-2) (CORONAVIRUS DISEASE [COVID-19]), AMPLIFIED PROBE TECHNIQUE, MAKING USE OF HIGH THROUGHPUT TECHNOLOGIES AS DESCRIBED BY CMS-2020-01-R: HCPCS | Performed by: FAMILY MEDICINE

## 2021-12-09 PROCEDURE — 99213 OFFICE O/P EST LOW 20 MIN: CPT | Performed by: FAMILY MEDICINE

## 2021-12-09 PROCEDURE — 87651 STREP A DNA AMP PROBE: CPT | Performed by: FAMILY MEDICINE

## 2021-12-09 PROCEDURE — 36415 COLL VENOUS BLD VENIPUNCTURE: CPT | Performed by: FAMILY MEDICINE

## 2021-12-09 PROCEDURE — 85025 COMPLETE CBC W/AUTO DIFF WBC: CPT | Performed by: FAMILY MEDICINE

## 2021-12-09 PROCEDURE — U0005 INFEC AGEN DETEC AMPLI PROBE: HCPCS | Performed by: FAMILY MEDICINE

## 2021-12-09 RX ORDER — FLUTICASONE PROPIONATE 50 MCG
SPRAY, SUSPENSION (ML) NASAL
Qty: 16 G | Refills: 0 | Status: SHIPPED | OUTPATIENT
Start: 2021-12-09 | End: 2023-05-22

## 2021-12-10 LAB
GROUP A STREP BY PCR: NOT DETECTED
SARS-COV-2 RNA RESP QL NAA+PROBE: NEGATIVE

## 2021-12-10 NOTE — PROGRESS NOTES
Assessment/Plan:   Postauricular adenopathy  Cough  Nasal congestion  Cervical and posterior auricular adenopathy following a week of upper respiratory congestion with sore throat cough and congestion.  Rapid strep test was negative today.  CBC is normal.  Covid test is pending.  No ear infection noted on exam, no mastoiditis or peritonsillar abscess.  We will treat with an antibiotic for a secondary bacterial adenitis.  Add Flonase for persistent congestion.  - Streptococcus A Rapid Screen w/Reflex to PCR - Clinic Collect  - CBC with platelets and differential  - Group A Streptococcus PCR Throat Swab  - amoxicillin-clavulanate (AUGMENTIN) 875-125 MG tablet; Take 1 tablet by mouth 2 times daily for 10 days  Dispense: 20 tablet; Refill: 0  - Symptomatic COVID-19 Virus (Coronavirus) by PCR Nose  - fluticasone (FLONASE) 50 MCG/ACT nasal spray; One spray each nostril once or twice daily  Dispense: 16 g; Refill: 0    I discussed red flag symptoms, return precautions to clinic/ER and follow up care with patient/parent.  Expected clinical course, symptomatic cares advised. Questions answered. Patient/parent amenable with plan.    Take the antibiotic as directed with food   Yogurt or probiotics   Warm or cold packs if soothing  Recheck if worse or no better    Subjective:     Mana Bull is a 51 year old female who presents for evaluation of a tender swollen lymph node behind her left ear.  This appeared yesterday and has been quite tender.  Last week she developed sore throat and cough.  No fever as she did have fatigue and some chest tightness.  She had had nasal congestion and cold/allergy symptoms for a couple weeks prior to that.  Nose alternated between stuffiness and running.  She now has left ear pain and pain behind the ear where the glands are swollen.  She has headache and some back pain since last night.  Mild nausea as well.  No vomiting.  She has had some loose stools for about a week.  She has had  ongoing fatigue.  Cough fluctuates in severity.  No wheezing or shortness of breath.  No chest pain.  No leg swelling or calf tenderness.  No specific sinus pain or pressure.  No known ill contacts.  She has had the flu shot this year as well as 2 doses of Covid vaccine with the booster.     No Known Allergies    Current Outpatient Medications   Medication     fluticasone (FLONASE) 50 MCG/ACT nasal spray     metFORMIN (GLUCOPHAGE-XR) 500 MG 24 hr tablet     naltrexone (DEPADE/REVIA) 50 MG tablet     phentermine (ADIPEX-P) 30 MG capsule     prazosin (MINIPRESS) 2 MG capsule     topiramate (TOPAMAX) 25 MG tablet     traZODone (DESYREL) 50 MG tablet     No current facility-administered medications for this visit.     Patient Active Problem List   Diagnosis     Class 1 obesity due to excess calories with serious comorbidity and body mass index (BMI) of 34.0 to 34.9 in adult     Benign essential hypertension     Prediabetes     Metabolic syndrome X     Avitaminosis D     Binge eating     Alcoholism /alcohol abuse     Morbid obesity (H)       Objective:     /82   Pulse 80   Temp 98.1  F (36.7  C)   Resp 15   Wt 90.7 kg (200 lb)   SpO2 97%   Breastfeeding No   BMI 37.18 kg/m      Physical  General Appearance: Alert, cooperative, no distress but low energy, afebrile vital signs stable  Head: Normocephalic, without obvious abnormality, atraumatic  Eyes: Conjunctivae are normal.   Ears: Normal TMs and external ear canals, both ears.  No significant pain with retraction of the pinnae.  There is palpable tender but not fluctuant postauricular lymph node about a centimeter behind the left ear.    Nose: congestion.  No sinus pain with percussion  Throat: Throat is red posteriorly.  No exudate.  No vesicular lesions  Neck: Mild left greater than right tender anterior adenopathy no posterior adenopathy.  Neck is supple  Lungs: Clear to auscultation bilaterally, respirations unlabored  Heart: Regular rate and  rhythm  Skin: Skin color, texture, turgor normal, no rashes or lesions  Psychiatric: Patient has a normal mood and affect.       Results for orders placed or performed in visit on 12/09/21   CBC with platelets and differential     Status: None   Result Value Ref Range    WBC Count 9.5 4.0 - 11.0 10e3/uL    RBC Count 4.75 3.80 - 5.20 10e6/uL    Hemoglobin 13.8 11.7 - 15.7 g/dL    Hematocrit 39.3 35.0 - 47.0 %    MCV 83 78 - 100 fL    MCH 29.1 26.5 - 33.0 pg    MCHC 35.1 31.5 - 36.5 g/dL    RDW 14.8 10.0 - 15.0 %    Platelet Count 253 150 - 450 10e3/uL    % Neutrophils 53 %    % Lymphocytes 34 %    % Monocytes 10 %    % Eosinophils 2 %    % Basophils 0 %    % Immature Granulocytes 0 %    Absolute Neutrophils 5.0 1.6 - 8.3 10e3/uL    Absolute Lymphocytes 3.3 0.8 - 5.3 10e3/uL    Absolute Monocytes 0.9 0.0 - 1.3 10e3/uL    Absolute Eosinophils 0.2 0.0 - 0.7 10e3/uL    Absolute Basophils 0.0 0.0 - 0.2 10e3/uL    Absolute Immature Granulocytes 0.0 <=0.4 10e3/uL   Streptococcus A Rapid Screen w/Reflex to PCR - Clinic Collect     Status: Normal    Specimen: Throat; Swab   Result Value Ref Range    Group A Strep antigen Negative Negative   CBC with platelets and differential     Status: None    Narrative    The following orders were created for panel order CBC with platelets and differential.  Procedure                               Abnormality         Status                     ---------                               -----------         ------                     CBC with platelets and d...[[id number]]                      Final result                 Please view results for these tests on the individual orders.

## 2021-12-10 NOTE — PATIENT INSTRUCTIONS
Take the antibiotic as directed with food   Yogurt or probiotics   Warm or cold packs if soothing  Recheck if worse or no better

## 2021-12-22 ENCOUNTER — HOSPITAL ENCOUNTER (OUTPATIENT)
Dept: CT IMAGING | Facility: CLINIC | Age: 51
End: 2021-12-22
Attending: PHYSICIAN ASSISTANT
Payer: COMMERCIAL

## 2021-12-22 ENCOUNTER — HOSPITAL ENCOUNTER (OUTPATIENT)
Dept: ULTRASOUND IMAGING | Facility: CLINIC | Age: 51
End: 2021-12-22
Attending: PHYSICIAN ASSISTANT
Payer: COMMERCIAL

## 2021-12-22 ENCOUNTER — OFFICE VISIT (OUTPATIENT)
Dept: FAMILY MEDICINE | Facility: CLINIC | Age: 51
End: 2021-12-22
Payer: COMMERCIAL

## 2021-12-22 VITALS
HEART RATE: 87 BPM | RESPIRATION RATE: 16 BRPM | DIASTOLIC BLOOD PRESSURE: 100 MMHG | SYSTOLIC BLOOD PRESSURE: 155 MMHG | TEMPERATURE: 98.8 F | OXYGEN SATURATION: 98 % | WEIGHT: 202 LBS | BODY MASS INDEX: 37.55 KG/M2

## 2021-12-22 DIAGNOSIS — R10.31 ABDOMINAL PAIN, RIGHT LOWER QUADRANT: Primary | ICD-10-CM

## 2021-12-22 DIAGNOSIS — R10.31 ABDOMINAL PAIN, RIGHT LOWER QUADRANT: ICD-10-CM

## 2021-12-22 PROBLEM — E66.01 MORBID OBESITY (H): Status: ACTIVE | Noted: 2021-12-22

## 2021-12-22 LAB
ALBUMIN UR-MCNC: NEGATIVE MG/DL
APPEARANCE UR: CLEAR
BILIRUB UR QL STRIP: NEGATIVE
COLOR UR AUTO: YELLOW
GLUCOSE UR STRIP-MCNC: NEGATIVE MG/DL
HGB UR QL STRIP: NEGATIVE
KETONES UR STRIP-MCNC: NEGATIVE MG/DL
LEUKOCYTE ESTERASE UR QL STRIP: NEGATIVE
NITRATE UR QL: NEGATIVE
PH UR STRIP: 6 [PH] (ref 5–8)
SP GR UR STRIP: >=1.03 (ref 1–1.03)
UROBILINOGEN UR STRIP-ACNC: 0.2 E.U./DL

## 2021-12-22 PROCEDURE — 250N000011 HC RX IP 250 OP 636: Performed by: PHYSICIAN ASSISTANT

## 2021-12-22 PROCEDURE — 76705 ECHO EXAM OF ABDOMEN: CPT

## 2021-12-22 PROCEDURE — 81003 URINALYSIS AUTO W/O SCOPE: CPT | Performed by: PHYSICIAN ASSISTANT

## 2021-12-22 PROCEDURE — 99214 OFFICE O/P EST MOD 30 MIN: CPT | Performed by: PHYSICIAN ASSISTANT

## 2021-12-22 PROCEDURE — 74177 CT ABD & PELVIS W/CONTRAST: CPT

## 2021-12-22 RX ORDER — IOPAMIDOL 755 MG/ML
100 INJECTION, SOLUTION INTRAVASCULAR ONCE
Status: COMPLETED | OUTPATIENT
Start: 2021-12-22 | End: 2021-12-22

## 2021-12-22 RX ADMIN — IOPAMIDOL 100 ML: 755 INJECTION, SOLUTION INTRAVENOUS at 16:37

## 2021-12-22 NOTE — PROGRESS NOTES
Assessment & Plan     Abdominal pain, right lower quadrant  Progressively worsening RLQ.    Labs as ordered.    DDx includes gastroenteritis, ovarian cyst, cystitis, pylonephritis, renal colic, appendicitis, diverticiultis. R sided upper quadrent pain is less severe on exam though includes DDx of pancreatitis, cholestasis, acute cholecystitis, gall stone pancreatitis.     Urine unremarkable thus less likely pylonephritis or stone.  CBC WNL . Will obtain CT abdomen and pelvs  To RO appendicitis, pancratitis.      CT returned concerning for possible early/mild acute cholecystitis. US obtained and negative for gall bladder thickening, stones, biliary duct obstruction though mild widening of common bile duct.  Lipase slightly elevated, though after the radiology evaluation pt pt much improved without fevers, nausea, vomiting. She was feeling much better.  Can not exclude passage of a small stone.  I would recommend observation, push fluids. Follow-up with pcp in 1 week if not improving or persisting. Consider further work up including NM evaluation if persisting or worsening.  Consider repeat lipase in the future to ensure it returns to normal given mild elevation.   - CBC with platelets and differential  - Basic metabolic panel  (Ca, Cl, CO2, Creat, Gluc, K, Na, BUN)  - UA macro with reflex to Microscopic and Culture - Clinc Collect  - CT Abdomen Pelvis w Contrast  - US Abdomen Limited  - Lipase  - Hepatic panel (Albumin, ALT, AST, Bili, Alk Phos, TP)             No follow-ups on file.    CALLUM Flores [facility]    Chidi Vital is a 51 year old female who presents to clinic today for the following health issues:  Chief Complaint   Patient presents with     Abdominal Pain     intense right sided pain     Nausea     HPI Pt presents to urgent care with concerns re: Right sided abdomen pain.    Started at 11;00. Progressively worse    Started with mild anorexia, general  malaise, progressive worsening abodomen pain.  Was not post prandrial. Not able to eat lunch due to pain. No radiation.  Nausea, no vomiting.  No fever.   No dysuria, frequency, urgency or hematuria.  No vaginal sx. No diarrhea or constipation.    Passing gas.  No hx of abdomen surgeries.  Pain 10/10 , improved later to be more 6/10 pain.     Review of Systems  Constitutional, HEENT, cardiovascular, pulmonary, gi and gu systems are negative, except as otherwise noted.      Objective    BP (!) 155/100 (BP Location: Right arm, Patient Position: Sitting, Cuff Size: Adult Large)   Pulse 87   Temp 98.8  F (37.1  C) (Oral)   Resp 16   Wt 91.6 kg (202 lb)   SpO2 98%   BMI 37.55 kg/m    Physical Exam   Pt is in no acute distress and appears well  Ears patent B:  TM s intact, non-injected. All land marks easily visibile    Nasal mucosa is non-edematous, no discharge.    Pharynx: non erythematous, tonsils non hypertrophied, No exudate   Neck supple: no adenopathy  Lungs: CTA  Heart: RRR, no murmur, no thrills or heaves   Ext: no edema  Skin: no rashes    Abdomen: BS hypoactive, soft, nondistended, TTP RLQ, R mid quardent. Milder in the RUQ.  No masses or hsm.  No L sided pain. No CVAT.    Results for orders placed or performed during the hospital encounter of 12/22/21   US Abdomen Limited     Status: None    Narrative    EXAM: US ABDOMEN LIMITED  LOCATION: [facility]  DATE/TIME: 12/22/2021 6:47 PM    INDICATION: RUQ abdomen US: R side pain with CT findings with stranding suggestive of ? acute cholecystitis  COMPARISON: CT 12/22/2021  TECHNIQUE: Limited abdominal ultrasound.    FINDINGS:    GALLBLADDER: Prominent pericholecystic fat. Gallbladder wall measures up to 3 mm in thickness. No shadowing stones. Negative sonographic Solo's sign. Incidental adenomyomatosis.    BILE DUCTS: No intrahepatic biliary dilatation. The common duct measures 8 mm.    LIVER: Normal parenchyma with smooth contour.  No focal mass.    RIGHT KIDNEY: No hydronephrosis.    PANCREAS: The visualized portions are normal.    No ascites.      Impression    IMPRESSION:  1.  No cholelithiasis and no sonographic evidence of acute cholecystitis.  2.  Borderline dilated common duct. No sonographic evidence of choledocholithiasis.    A phone call report regarding the critical findings on this exam was made to Dr. Zamora at 7:11 PM on 12/22/2021.   Results for orders placed or performed during the hospital encounter of 12/22/21   CT Abdomen Pelvis w Contrast     Status: None    Narrative    EXAM: CT ABDOMEN PELVIS W CONTRAST  LOCATION: [facility]  DATE/TIME: 12/22/2021 4:27 PM    INDICATION: RLQ abdominal pain, appendicitis suspected (Age >= 14y)  COMPARISON: None.  TECHNIQUE: CT scan of the abdomen and pelvis was performed following injection of IV contrast. Multiplanar reformats were obtained. Dose reduction techniques were used.  CONTRAST: Isovue-370 100mL    FINDINGS:   LOWER CHEST: Normal.    HEPATOBILIARY: Some subtle soft tissue stranding around the gallbladder concerning for possible very mild cholecystitis. No significant findings in the liver.    PANCREAS: Normal.    SPLEEN: Normal.    ADRENAL GLANDS: Normal.    KIDNEYS/BLADDER: Normal.    BOWEL: Normal including normal appendix.    LYMPH NODES: Normal.    VASCULATURE: Unremarkable.    PELVIC ORGANS: Normal.    MUSCULOSKELETAL: Normal.      Impression    IMPRESSION:   1.  Very subtle stranding around the gallbladder concerning for mild acute cholecystitis.    2.  No other significant findings.   Results for orders placed or performed in visit on 12/22/21   Basic metabolic panel  (Ca, Cl, CO2, Creat, Gluc, K, Na, BUN)     Status: Normal   Result Value Ref Range    Sodium 143 136 - 145 mmol/L    Potassium 4.8 3.5 - 5.0 mmol/L    Chloride 106 98 - 107 mmol/L    Carbon Dioxide (CO2) 24 22 - 31 mmol/L    Anion Gap 13 5 - 18 mmol/L    Urea Nitrogen 9 8 - 22  mg/dL    Creatinine 0.75 0.60 - 1.10 mg/dL    Calcium 9.5 8.5 - 10.5 mg/dL    Glucose 114 70 - 125 mg/dL    GFR Estimate >90 >60 mL/min/1.73m2   CBC with platelets and differential     Status: None   Result Value Ref Range    WBC Count 8.6 4.0 - 11.0 10e3/uL    RBC Count 4.73 3.80 - 5.20 10e6/uL    Hemoglobin 14.0 11.7 - 15.7 g/dL    Hematocrit 39.3 35.0 - 47.0 %    MCV 83 78 - 100 fL    MCH 29.6 26.5 - 33.0 pg    MCHC 35.6 31.5 - 36.5 g/dL    RDW 14.4 10.0 - 15.0 %    Platelet Count 263 150 - 450 10e3/uL    % Neutrophils 57 %    % Lymphocytes 33 %    % Monocytes 8 %    % Eosinophils 2 %    % Basophils 1 %    % Immature Granulocytes 0 %    Absolute Neutrophils 4.9 1.6 - 8.3 10e3/uL    Absolute Lymphocytes 2.8 0.8 - 5.3 10e3/uL    Absolute Monocytes 0.7 0.0 - 1.3 10e3/uL    Absolute Eosinophils 0.2 0.0 - 0.7 10e3/uL    Absolute Basophils 0.0 0.0 - 0.2 10e3/uL    Absolute Immature Granulocytes 0.0 <=0.4 10e3/uL   Lipase     Status: Abnormal   Result Value Ref Range    Lipase 77 (H) 0 - 52 U/L   Hepatic panel (Albumin, ALT, AST, Bili, Alk Phos, TP)     Status: Normal   Result Value Ref Range    Bilirubin Total 0.4 0.0 - 1.0 mg/dL    Bilirubin Direct 0.1 <=0.5 mg/dL    Protein Total 7.0 6.0 - 8.0 g/dL    Albumin 3.7 3.5 - 5.0 g/dL    Alkaline Phosphatase 104 45 - 120 U/L    AST 14 0 - 40 U/L    ALT <9 0 - 45 U/L   CBC with platelets and differential     Status: None    Narrative    The following orders were created for panel order CBC with platelets and differential.  Procedure                               Abnormality         Status                     ---------                               -----------         ------                     CBC with platelets and d...[[id number]]                      Final result                 Please view results for these tests on the individual orders.   Results for orders placed or performed in visit on 12/22/21   UA macro with reflex to Microscopic and Culture - Clinc Collect      Status: Normal    Specimen: Urine, Clean Catch   Result Value Ref Range    Color Urine Yellow Colorless, Straw, Light Yellow, Yellow    Appearance Urine Clear Clear    Glucose Urine Negative Negative mg/dL    Bilirubin Urine Negative Negative    Ketones Urine Negative Negative mg/dL    Specific Gravity Urine >=1.030 1.005 - 1.030    Blood Urine Negative Negative    pH Urine 6.0 5.0 - 8.0    Protein Albumin Urine Negative Negative mg/dL    Urobilinogen Urine 0.2 0.2, 1.0 E.U./dL    Nitrite Urine Negative Negative    Leukocyte Esterase Urine Negative Negative    Narrative    Microscopic not indicated

## 2022-01-09 ENCOUNTER — HEALTH MAINTENANCE LETTER (OUTPATIENT)
Age: 52
End: 2022-01-09

## 2022-01-12 ENCOUNTER — ALLIED HEALTH/NURSE VISIT (OUTPATIENT)
Dept: FAMILY MEDICINE | Facility: CLINIC | Age: 52
End: 2022-01-12
Payer: COMMERCIAL

## 2022-01-12 ENCOUNTER — LAB REQUISITION (OUTPATIENT)
Dept: LAB | Facility: CLINIC | Age: 52
End: 2022-01-12

## 2022-01-12 DIAGNOSIS — R68.83 CHILLS (WITHOUT FEVER): Primary | ICD-10-CM

## 2022-01-12 DIAGNOSIS — R51.9 HEADACHE: ICD-10-CM

## 2022-01-12 DIAGNOSIS — R50.9 FEVER: ICD-10-CM

## 2022-01-12 DIAGNOSIS — R68.83 CHILLS: ICD-10-CM

## 2022-01-12 PROCEDURE — U0003 INFECTIOUS AGENT DETECTION BY NUCLEIC ACID (DNA OR RNA); SEVERE ACUTE RESPIRATORY SYNDROME CORONAVIRUS 2 (SARS-COV-2) (CORONAVIRUS DISEASE [COVID-19]), AMPLIFIED PROBE TECHNIQUE, MAKING USE OF HIGH THROUGHPUT TECHNOLOGIES AS DESCRIBED BY CMS-2020-01-R: HCPCS | Performed by: INTERNAL MEDICINE

## 2022-01-12 PROCEDURE — 99207 PR NON-BILLABLE SERV PER CHARTING: CPT

## 2022-01-13 LAB — SARS-COV-2 RNA RESP QL NAA+PROBE: NEGATIVE

## 2022-01-18 ENCOUNTER — HOSPITAL ENCOUNTER (EMERGENCY)
Facility: CLINIC | Age: 52
Discharge: HOME | End: 2022-01-18
Attending: EMERGENCY MEDICINE | Admitting: EMERGENCY MEDICINE
Payer: COMMERCIAL

## 2022-01-18 VITALS
RESPIRATION RATE: 18 BRPM | HEART RATE: 89 BPM | SYSTOLIC BLOOD PRESSURE: 133 MMHG | DIASTOLIC BLOOD PRESSURE: 82 MMHG | HEIGHT: 62 IN | OXYGEN SATURATION: 94 % | BODY MASS INDEX: 36.8 KG/M2 | WEIGHT: 200 LBS | TEMPERATURE: 97.8 F

## 2022-01-18 DIAGNOSIS — Z20.822 PERSON UNDER INVESTIGATION FOR COVID-19: ICD-10-CM

## 2022-01-18 DIAGNOSIS — R05.9 COUGH: ICD-10-CM

## 2022-01-18 LAB
FLUAV RNA SPEC QL NAA+PROBE: NEGATIVE
FLUBV RNA RESP QL NAA+PROBE: NEGATIVE
SARS-COV-2 RNA RESP QL NAA+PROBE: NEGATIVE

## 2022-01-18 PROCEDURE — 87636 SARSCOV2 & INF A&B AMP PRB: CPT | Performed by: EMERGENCY MEDICINE

## 2022-01-18 PROCEDURE — C9803 HOPD COVID-19 SPEC COLLECT: HCPCS

## 2022-01-18 PROCEDURE — 99283 EMERGENCY DEPT VISIT LOW MDM: CPT

## 2022-01-18 ASSESSMENT — MIFFLIN-ST. JEOR: SCORE: 1485.44

## 2022-01-18 NOTE — ED PROVIDER NOTES
EMERGENCY DEPARTMENT ENCOUNTER      NAME: Mana Bull  AGE: 51 year old female  YOB: 1970  MRN: [id number]  EVALUATION DATE & TIME: 2022  2:28 PM    PCP: Abimael Walker    ED PROVIDER: Rick Almanzar M.D.      Chief Complaint   Patient presents with     Covid Concern         FINAL IMPRESSION:  1. Cough    2. Person under investigation for COVID-19          ED COURSE & MEDICAL DECISION MAKIN year old female presents to the Emergency Department for evaluation of body aches, cough, nausea, and diarrhea.  Multiple household contacts sick with COVID-19.  Denies any shortness of breath at rest.  She is vitally stable on arrival.  Cardiopulmonary and abdominal exams are benign.  Her symptoms do seem suggestive of a viral upper respiratory and gastrointestinal process.  Had initiated testing for COVID and influenza.  As the patient was being discharged these tests actually did result negative and I updated her about the test results.  Based on her benign exam otherwise and stable vital signs, I think she still should be stable for continued supportive cares at home.  She will put fluids, take Tylenol and ibuprofen for pain or fever, and plan to follow-up in clinic by next week to review any ongoing symptoms.  Patient was discharged in stable condition.    2:37 PM I met with the patient to gather history and to perform my initial exam. I discussed the plan for care while in the Emergency Department. PPE (gloves, glasses, N95 mask) was worn during patient encounters.  2:48 PM Discussed plan for discharge after COVID test, which patient is agreeable with.  3:00 PM Patient's COVID test came back before she was discharged, so I informed her it was negative.    At the conclusion of the encounter I discussed the results of all of the tests and the disposition. The questions were answered. The patient or family acknowledged understanding and was agreeable with the care plan.     MEDICATIONS  GIVEN IN THE EMERGENCY:  Medications - No data to display    NEW PRESCRIPTIONS STARTED AT TODAY'S ER VISIT  Discharge Medication List as of 1/18/2022  2:57 PM             =================================================================    HPI    Patient information was obtained from: Patient    Use of : N/A        Mana Bull is a 51 year old female with a pertinent history of depression, alcohol abuse, obesity, and HTN who presents to this ED by private vehicle for evaluation of COVID concern.     Patient reports that multiple family members in her household have tested positive for COVID. She had a negative test last week, but for the past 6 days she has been experiencing loose stools, nausea, occasional vomiting, myalgias, and cough. The cough is largely non-productive. Patient denies any shortness of breath. She is fully vaccinated against COVID. Denies chest pain, abdominal pain, leg swelling or pain, or additional medical concerns or complaints at this time.       REVIEW OF SYSTEMS   All systems reviewed and negative except as noted in HPI.    PAST MEDICAL HISTORY:  Past Medical History:   Diagnosis Date     Alcohol abuse      Hypertension 4-     OCD (obsessive compulsive disorder)     mostly resolved.  Tactile symptoms as teenager.     PTSD (post-traumatic stress disorder)      Suspected COVID-19 virus infection 11/30/2020       PAST SURGICAL HISTORY:  Past Surgical History:   Procedure Laterality Date     EYE SURGERY             CURRENT MEDICATIONS:    No current facility-administered medications for this encounter.     Current Outpatient Medications   Medication     fluticasone (FLONASE) 50 MCG/ACT nasal spray     metFORMIN (GLUCOPHAGE-XR) 500 MG 24 hr tablet     naltrexone (DEPADE/REVIA) 50 MG tablet     phentermine (ADIPEX-P) 30 MG capsule     prazosin (MINIPRESS) 2 MG capsule     topiramate (TOPAMAX) 25 MG tablet     traZODone (DESYREL) 50 MG tablet         ALLERGIES:  No Known  "Allergies    FAMILY HISTORY:  Family History   Problem Relation Age of Onset     Diabetes Mother      Hypertension Mother      Hyperlipidemia Mother      Cerebrovascular Disease Mother      Breast Cancer Mother         late 50s     Obesity Mother      Hypertension Father      Diabetes Father              No Known Problems Sister      Hyperlipidemia Maternal Grandmother              Hyperlipidemia Maternal Grandfather      Diabetes Maternal Grandfather              Hyperlipidemia Paternal Grandmother              Hyperlipidemia Paternal Grandfather              No Known Problems Sister        SOCIAL HISTORY:   Social History     Socioeconomic History     Marital status: Single     Spouse name: Not on file     Number of children: Not on file     Years of education: Not on file     Highest education level: Not on file   Occupational History     Not on file   Tobacco Use     Smoking status: Former Smoker     Packs/day: 0.00     Years: 20.00     Pack years: 0.00     Types: Cigarettes     Quit date: 2020     Years since quittin.5     Smokeless tobacco: Never Used   Substance and Sexual Activity     Alcohol use: Not Currently     Comment: OCCASTIONAL     Drug use: No     Sexual activity: Not Currently     Partners: Male     Birth control/protection: Condom   Other Topics Concern     Not on file   Social History Narrative     Not on file     Social Determinants of Health     Financial Resource Strain: Not on file   Food Insecurity: Not on file   Transportation Needs: Not on file   Physical Activity: Not on file   Stress: Not on file   Social Connections: Not on file   Intimate Partner Violence: Not on file   Housing Stability: Not on file       VITALS:  /82   Pulse 89   Temp 97.8  F (36.6  C) (Temporal)   Resp 18   Ht 1.575 m (5' 2\")   Wt 90.7 kg (200 lb)   SpO2 94%   Breastfeeding No   BMI 36.58 kg/m      PHYSICAL EXAM    Constitutional: Well developed, Well " nourished, NAD.  HENT: Normocephalic, Atraumatic. Neck Supple.  Eyes: EOMI, Conjunctiva normal.  Respiratory: Breathing comfortably on room air. Speaks full sentences easily. Lungs clear to ascultation.  Cardiovascular: Normal heart rate, Regular rhythm. No peripheral edema.  Abdomen: Soft, nontender  Musculoskeletal: Good range of motion in all major joints. No major deformities noted.  Integument: Warm, Dry.  Neurologic: Alert & awake, Normal motor function, Normal sensory function, No focal deficits noted.   Psychiatric: Cooperative.     LAB:  All pertinent labs reviewed and interpreted.  Labs Ordered and Resulted from Time of ED Arrival to Time of ED Departure   INFLUENZA A/B & SARS-COV2 PCR MULTIPLEX - Normal       Result Value    Influenza A PCR Negative      Influenza B PCR Negative      SARS CoV2 PCR Negative         EKG:    None    PROCEDURES:   None      I, Lorrie Herrmann, am serving as a scribe to document services personally performed by Dr. Rick Almanzar, based on my observation and the provider's statements to me. I, Rick Almanzar MD attest that Lorrie Herrmann is acting in a scribe capacity, has observed my performance of the services and has documented them in accordance with my direction.    Rick Almanzar M.D.  Emergency Medicine  [facility] EMERGENCY ROOM  [address] [id number] [phone]  Dept: [phone]       Rick Almanzar MD  01/18/22 [id number]

## 2022-01-18 NOTE — DISCHARGE INSTRUCTIONS
You were seen today in the emergency department at [facility] for cough, congestion, vomiting, diarrhea.  We think your symptoms are likely related to COVID-19 or similar upper respiratory type pathogen.  We are reassured by your vital signs and exam findings today.  We performed a COVID-19 test and influenza test, these tests are pending and will be likely resulted within the next 24 hours.  The fastest way to review your results is to sign up for MyChart.  Please stay strictly isolated at home.  Take Tylenol 500 mg every 6 hours and ibuprofen 400 mg every 6 hours for pain or fever.  Drink 2 to 3 L of water or Gatorade every day to keep yourself well-hydrated.  Maintain strict isolation until your infectious symptoms are resolved and your COVID test is returned.  You can call your clinic with any other additional concerns or return to the emergency department for any other immediate severe concerns like severe shortness of breath at rest or intractable vomiting.

## 2022-01-18 NOTE — ED TRIAGE NOTES
Patient presents to the ED with complaints of fever and chills, diarrhea, and chest congestion with a cough. Tested negative for Covid last week, several family members have tested positive recently.

## 2022-02-17 PROBLEM — F10.20 ALCOHOL DEPENDENCE, UNCOMPLICATED (H): Status: ACTIVE | Noted: 2021-07-28

## 2022-08-18 ENCOUNTER — E-VISIT (OUTPATIENT)
Dept: FAMILY MEDICINE | Facility: CLINIC | Age: 52
End: 2022-08-18
Payer: COMMERCIAL

## 2022-08-18 ENCOUNTER — VIRTUAL VISIT (OUTPATIENT)
Dept: INTERNAL MEDICINE | Facility: CLINIC | Age: 52
End: 2022-08-18

## 2022-08-18 DIAGNOSIS — Z11.59 NEED FOR HEPATITIS C SCREENING TEST: ICD-10-CM

## 2022-08-18 DIAGNOSIS — Z11.4 SCREENING FOR HIV (HUMAN IMMUNODEFICIENCY VIRUS): ICD-10-CM

## 2022-08-18 DIAGNOSIS — N90.7 LABIAL CYST: Primary | ICD-10-CM

## 2022-08-18 DIAGNOSIS — Z12.4 CERVICAL CANCER SCREENING: ICD-10-CM

## 2022-08-18 DIAGNOSIS — Z12.11 SCREEN FOR COLON CANCER: ICD-10-CM

## 2022-08-18 DIAGNOSIS — Z12.31 VISIT FOR SCREENING MAMMOGRAM: ICD-10-CM

## 2022-08-18 PROCEDURE — 99213 OFFICE O/P EST LOW 20 MIN: CPT | Mod: GT | Performed by: INTERNAL MEDICINE

## 2022-08-18 PROCEDURE — 99207 PR NON-BILLABLE SERV PER CHARTING: CPT | Performed by: FAMILY MEDICINE

## 2022-08-18 NOTE — PROGRESS NOTES
Zahraa is a 51 year old who is being evaluated via a billable video visit.      How would you like to obtain your AVS? MyChart  If the video visit is dropped, the invitation should be resent by: Text to cell phone: [phone]  Will anyone else be joining your video visit? No        Subjective   Zahraa is a 51 year old, presenting for the following health issues:  Cyst (-left- side of labia has a large cyst that now has popped and draining-very painful x6days)      Converted to telephone visit.    Missed work starting on Tuesday, August 16 because of the labial cyst.  Although the cyst started draining spontaneously, she is concerned that it could be becoming infected.    Prescribed for her Augmentin 875 mg twice a day, 10-day course.    I recommended she see a local gynecologist, and she will pursue that today.

## 2022-08-30 ENCOUNTER — OFFICE VISIT (OUTPATIENT)
Dept: FAMILY MEDICINE | Facility: CLINIC | Age: 52
End: 2022-08-30
Payer: COMMERCIAL

## 2022-08-30 ENCOUNTER — HOSPITAL ENCOUNTER (EMERGENCY)
Facility: CLINIC | Age: 52
Discharge: HOME | End: 2022-08-30
Attending: EMERGENCY MEDICINE | Admitting: EMERGENCY MEDICINE
Payer: COMMERCIAL

## 2022-08-30 VITALS
TEMPERATURE: 98.9 F | SYSTOLIC BLOOD PRESSURE: 143 MMHG | DIASTOLIC BLOOD PRESSURE: 87 MMHG | HEART RATE: 98 BPM | RESPIRATION RATE: 14 BRPM | OXYGEN SATURATION: 99 %

## 2022-08-30 VITALS
WEIGHT: 220 LBS | OXYGEN SATURATION: 96 % | TEMPERATURE: 98.2 F | DIASTOLIC BLOOD PRESSURE: 78 MMHG | SYSTOLIC BLOOD PRESSURE: 135 MMHG | RESPIRATION RATE: 16 BRPM | HEIGHT: 62 IN | BODY MASS INDEX: 40.48 KG/M2 | HEART RATE: 79 BPM

## 2022-08-30 DIAGNOSIS — N75.0 INFECTED CYST OF BARTHOLIN'S GLAND DUCT: Primary | ICD-10-CM

## 2022-08-30 DIAGNOSIS — N73.9 ABSCESS OF FEMALE GENITALIA: ICD-10-CM

## 2022-08-30 PROCEDURE — 99214 OFFICE O/P EST MOD 30 MIN: CPT | Performed by: PHYSICIAN ASSISTANT

## 2022-08-30 PROCEDURE — 250N000013 HC RX MED GY IP 250 OP 250 PS 637

## 2022-08-30 PROCEDURE — 10060 I&D ABSCESS SIMPLE/SINGLE: CPT

## 2022-08-30 PROCEDURE — 99284 EMERGENCY DEPT VISIT MOD MDM: CPT | Mod: 25

## 2022-08-30 RX ORDER — MUPIROCIN 20 MG/G
OINTMENT TOPICAL
Qty: 15 G | Refills: 0 | Status: SHIPPED | OUTPATIENT
Start: 2022-08-30 | End: 2023-05-22

## 2022-08-30 RX ORDER — SULFAMETHOXAZOLE/TRIMETHOPRIM 800-160 MG
1 TABLET ORAL 2 TIMES DAILY
Qty: 20 TABLET | Refills: 0 | Status: SHIPPED | OUTPATIENT
Start: 2022-08-30 | End: 2022-09-09

## 2022-08-30 RX ORDER — SULFAMETHOXAZOLE/TRIMETHOPRIM 800-160 MG
1 TABLET ORAL ONCE
Status: COMPLETED | OUTPATIENT
Start: 2022-08-30 | End: 2022-08-30

## 2022-08-30 RX ORDER — IBUPROFEN 600 MG/1
600 TABLET, FILM COATED ORAL ONCE
Status: COMPLETED | OUTPATIENT
Start: 2022-08-30 | End: 2022-08-30

## 2022-08-30 RX ADMIN — SULFAMETHOXAZOLE AND TRIMETHOPRIM 1 TABLET: 800; 160 TABLET ORAL at 21:09

## 2022-08-30 RX ADMIN — IBUPROFEN 600 MG: 600 TABLET ORAL at 21:09

## 2022-08-30 ASSESSMENT — ACTIVITIES OF DAILY LIVING (ADL): ADLS_ACUITY_SCORE: 33

## 2022-08-30 ASSESSMENT — ENCOUNTER SYMPTOMS: RHINORRHEA: 1

## 2022-08-30 NOTE — ED TRIAGE NOTES
Pt arrives to ED with c/o left labia cyst. Pt states she had one two weeks ago and it popped on it own and now its back. Pt endorses to redness and pain to area, difficulty walking.       Triage Assessment     Row Name 08/30/22 8579       Triage Assessment (Adult)    Airway WDL WDL       Respiratory WDL    Respiratory WDL WDL       Skin Circulation/Temperature WDL    Skin Circulation/Temperature WDL WDL       Cardiac WDL    Cardiac WDL WDL       Peripheral/Neurovascular WDL    Peripheral Neurovascular WDL WDL       Cognitive/Neuro/Behavioral WDL    Cognitive/Neuro/Behavioral WDL WDL

## 2022-08-30 NOTE — PROGRESS NOTES
Patient presents with:  Cyst: Has cyst on left side vaginal area had abt 2 weeks again opened up and drained now last 3 days back and  larger and more painful      Clinical Decision Making:  Infected cyst of Bartholin's gland with recent 10-day course of Augmentin therapy that did not resolve the infection.  Patient had return of worsening symptoms with increased pain and swelling.  Patient is prediabetic.  Patient is sent to the next higher level of care for definitive evaluation and treatment with incision and drainage and placement of Word catheter.  This was explained to patient and questions were answered to her satisfaction before discharge.      ICD-10-CM    1. Infected cyst of Bartholin's gland duct  N75.0        Patient Instructions   Present to the ER for Incision and drainage and possible placement of Word catheter.          HPI:  Mana Bull is a 51 year old female who has a past medical history for prediabetes elevated BMI who presents today for worsening pain and swelling to the left labia.  She is describing what amounts to a infected Bartholin's cyst.  She has had reaccumulation after spontaneous drainage and antibiotic therapy.  Currently she does not have fever chills night sweats or fatigue.    Patient was placed on Augmentin 875 mg twice a day for 10 days starting 8/18/2022.  Patient completed the course of antibiotic and now it has come back much larger and more painful.    History obtained from chart review and the patient.    Problem List:  2021-12: Morbid obesity (H)  2021-07: Alcoholism /alcohol abuse  2020-06: Binge eating  2019-06: Avitaminosis D  2019-06: Class 1 obesity due to excess calories with serious   comorbidity and body mass index (BMI) of 34.0 to 34.9 in adult  2019-06: Benign essential hypertension  2019-06: Prediabetes  2019-06: Metabolic syndrome X      Past Medical History:   Diagnosis Date     Alcohol abuse      Hypertension 4-     OCD (obsessive compulsive  disorder)     mostly resolved.  Tactile symptoms as teenager.     PTSD (post-traumatic stress disorder)      Suspected COVID-19 virus infection 2020       Social History     Tobacco Use     Smoking status: Former Smoker     Packs/day: 0.00     Years: 20.00     Pack years: 0.00     Types: Cigarettes     Quit date: 2020     Years since quittin.1     Smokeless tobacco: Never Used   Substance Use Topics     Alcohol use: Not Currently     Comment: OCCASTIONAL       Review of Systems  As above in HPI otherwise negative.    Vitals:    22 1701   BP: (!) 143/87   Pulse: 98   Resp: 14   Temp: 98.9  F (37.2  C)   TempSrc: Oral   SpO2: 99%       General: Patient is resting comfortably no acute distress is afebrile  HEENT: Head is normocephalic atraumatic   : is deferred.    No other physical exam performed.    Physical Exam    At the end of the encounter, I discussed results, diagnosis, medications. Discussed red flags for immediate return to clinic/ER, as well as indications for follow up if no improvement. Patient understood and agreed to plan. Patient was stable for discharge.

## 2022-08-31 NOTE — ED PROVIDER NOTES
EMERGENCY DEPARTMENT ENCOUNTER      NAME: Mana Bull  AGE: 51 year old female  YOB: 1970  MRN: [id number]  EVALUATION DATE & TIME: 2022  7:52 PM    PCP: Abimael Walker    ED PROVIDER: Lizz Mahajan DO      Chief Complaint   Patient presents with     Cyst         FINAL IMPRESSION:  1. Abscess of female genitalia          ED COURSE & MEDICAL DECISION MAKIN-year-old female presented to the ED for evaluation of an abscess located in her left lower pelvic region.  The patient states that the abscess initially ruptured a few weeks ago and she was placed on Augmentin.  However, the abscess reformed and her pain increased spite being on the antibiotics.  Upon arrival to the ED the patient was noted to be slightly hypertensive but she was otherwise hemodynamically stable.  She did not appear to be in any acute distress or discomfort at the time of her initial evaluation.  On exam the patient was noted to have a small abscess located in the left pelvic region.  There was a small amount of purulent drainage noted from the most fluctuant area of the abscess.  There was surrounding erythema and induration noted.    Following initial evaluation the abscess was I&D ED here in the ED and a small to moderate amount of purulent material was expressed from the abscess.  Please see the procedure note for further details.      Patient was informed that her abscess is likely due to MRSA and that the Augmentin did not likely treat the infection.  The patient was given ibuprofen here in the ED for further pain control.  After educating and reassuring the patient she felt comfortable returning home.  The patient was sent home with Bactrim to treat the infection.  She was also sent home with Hibiclens and mupirocin ointment to treat suspected MRSA carrier status.  The patient was instructed to continue taking over-the-counter ibuprofen as needed for any further pain.  She was instructed to apply warm  compresses multiple times a day to continue to allow the abscess to drain.  The patient was instructed to follow-up with her primary care provider for reevaluation or to return back to ED sooner for any worsening pain or any other new or concerning symptoms.    Pertinent Labs & Imaging studies reviewed. (See chart for details)  7:55 PM Medical resident completed the initial interview and examination.   8:16 PM I met with the patient to gather history and to perform my initial exam. We discussed plans for the ED course, including diagnostic testing and treatment.   8:20PM I preformed an I&D procedure.       At the conclusion of the encounter I discussed the results of all of the tests and the disposition. The questions were answered. The patient or family acknowledged understanding and was agreeable with the care plan.       PPE worn: n95 mask, goggles    MEDICATIONS GIVEN IN THE EMERGENCY:  Medications   sulfamethoxazole-trimethoprim (BACTRIM DS) 800-160 MG per tablet 1 tablet (1 tablet Oral Given 8/30/22 2109)   ibuprofen (ADVIL/MOTRIN) tablet 600 mg (600 mg Oral Given 8/30/22 2109)       NEW PRESCRIPTIONS STARTED AT TODAY'S ER VISIT  Discharge Medication List as of 8/30/2022  9:12 PM      START taking these medications    Details   chlorhexidine (HIBICLENS) 4 % liquid Apply to body and wash daily for 7 days.Disp-473 mL, [id number]      mupirocin (BACTROBAN) 2 % external ointment Apply to both nostrils twice daily for one weekDisp-15 g, [id number]      sulfamethoxazole-trimethoprim (BACTRIM DS) 800-160 MG tablet Take 1 tablet by mouth 2 times daily for 10 days, Disp-20 tablet, R-0, E-Prescribe                =================================================================    HPI    Patient information was obtained from: Patient     Use of : N/A        Mana Bull is a 51 year old female with a pertinent history of hypertension, alcohol abuse, and morbid obesity who presents to this ED via  walk-in for evaluation of cyst.     Patient reports three weeks of an abscess below her umbilicus and above her genitalia. Two weeks ago the abscess ruptured while she was sleeping and she was started on Augmentin. This week, the abscess as started reforming. She rates her pain a 6/10 and notes it is provoked by sitting and bending over. Additionally, patient endorses a runny nose and sneezing more frequently.     Of note, patient has been experiencing one month of intermittent heart palpitations but notes she is drinking more coffee and has been experiencing more anxiety in her life.     SHx: 1/2 pack of cigarettes/day for the past 20 years.       REVIEW OF SYSTEMS   Review of Systems   HENT: Positive for rhinorrhea and sneezing.    Genitourinary:        Positive for abscess.    All other systems reviewed and are negative.      PAST MEDICAL HISTORY:  Past Medical History:   Diagnosis Date     Alcohol abuse      Hypertension 4-     OCD (obsessive compulsive disorder)     mostly resolved.  Tactile symptoms as teenager.     PTSD (post-traumatic stress disorder)      Suspected COVID-19 virus infection 11/30/2020       PAST SURGICAL HISTORY:  Past Surgical History:   Procedure Laterality Date     EYE SURGERY             CURRENT MEDICATIONS:    chlorhexidine (HIBICLENS) 4 % liquid  mupirocin (BACTROBAN) 2 % external ointment  sulfamethoxazole-trimethoprim (BACTRIM DS) 800-160 MG tablet  fluticasone (FLONASE) 50 MCG/ACT nasal spray  metFORMIN (GLUCOPHAGE-XR) 500 MG 24 hr tablet  naltrexone (DEPADE/REVIA) 50 MG tablet  phentermine (ADIPEX-P) 30 MG capsule  prazosin (MINIPRESS) 2 MG capsule  topiramate (TOPAMAX) 25 MG tablet  traZODone (DESYREL) 50 MG tablet        ALLERGIES:  No Known Allergies    FAMILY HISTORY:  Family History   Problem Relation Age of Onset     Diabetes Mother      Hypertension Mother      Hyperlipidemia Mother      Cerebrovascular Disease Mother      Breast Cancer Mother         late 50s      "Obesity Mother      Hypertension Father      Diabetes Father              No Known Problems Sister      Hyperlipidemia Maternal Grandmother              Hyperlipidemia Maternal Grandfather      Diabetes Maternal Grandfather              Hyperlipidemia Paternal Grandmother              Hyperlipidemia Paternal Grandfather              No Known Problems Sister        SOCIAL HISTORY:   Social History     Socioeconomic History     Marital status: Single     Spouse name: None     Number of children: None     Years of education: None     Highest education level: None   Tobacco Use     Smoking status: Former Smoker     Packs/day: 0.00     Years: 20.00     Pack years: 0.00     Types: Cigarettes     Quit date: 2020     Years since quittin.1     Smokeless tobacco: Never Used   Substance and Sexual Activity     Alcohol use: Not Currently     Comment: OCCASTIONAL     Drug use: No     Sexual activity: Not Currently     Partners: Male     Birth control/protection: Condom       VITALS:  /78   Pulse 79   Temp 98.2  F (36.8  C) (Temporal)   Resp 16   Ht 1.575 m (5' 2\")   Wt 99.8 kg (220 lb)   SpO2 96%   BMI 40.24 kg/m      PHYSICAL EXAM    General presentation: Alert, Vital signs reviewed. No apparent distress.   HENT: ENT inspection is normal. Oropharynx is moist and clear.   Eye: Pupils are equal and reactive to light. EOMI  Neck: The neck is supple.  Pulmonary: Currently in no acute respiratory distress.   Circulatory: Peripheral pulses are strong and equal in the bilateral upper lower extremities.   Neurologic: Alert, oriented to person, place, and time. No gross motor or sensory deficits noted in the upper or lower extremities. Cranial nerves II through XII are grossly intact.  Musculoskeletal: No extremity tenderness. Full range of motion in all extremities. No extremity edema.   Skin: Skin color is normal. No rash. Warm. Dry to touch. Fluctuant area in left pelvic " region that has purulent drainage with a small area of surrounding induration, warmth, and erythema. Area in question is moderately tender to palpation.      LAB:  All pertinent labs reviewed and interpreted.       RADIOLOGY:  Reviewed all pertinent imaging. Please see official radiology report.  No orders to display       PROCEDURES:   PROCEDURE: Incision and Drainage   INDICATIONS: Localized abscess   PROCEDURE PROVIDER: Dr Lizz Mahajan   SITE: Left pelvic region   MEDICATION: None   NOTE: The area was prepped with alcohol and draped off in the usual sterile fashion.  The area of maximal fluctuance was opened with a # 11 Blade ([place]) using a Single Straight incision to allow for drainage.  The abscess was drained.  The abscess cavity was bluntly explored to separate any loculations. No Packing was placed into the abscess cavity.  A sterile dressing was placed over the area.   COMPLEXITY: Simple    Simple = single, furuncle, paronychia, superficial  Complex = multiple or abscess requiring probing, loculations, packing placement   COMPLICATIONS: Patient tolerated procedure well, without complication         I, Jerrica Lr , am serving as a scribe to document services personally performed by Lizz Mahajan DO based on my observation and the provider's statements to me. I, Lizz Mahajan, attest that Jerrica Lr is acting in a scribe capacity, has observed my performance of the services and has documented them in accordance with my direction.    Lizz Mahajan DO  Emergency Medicine  [facility] EMERGENCY ROOM  [address] [id number] [phone]       Lizz Mahajan DO  08/30/22 7096

## 2022-08-31 NOTE — DISCHARGE INSTRUCTIONS
Follow-up with PCP in one week to discuss hospital visit and to re-check abscess. Take antibiotics as directed and use ibuprofen as needed. Use Hibiclens wash daily for 7 days and apply mupirocin to both nostrils twice daily for one week.

## 2022-11-10 ENCOUNTER — APPOINTMENT (OUTPATIENT)
Dept: RADIOLOGY | Facility: CLINIC | Age: 52
End: 2022-11-10
Attending: EMERGENCY MEDICINE
Payer: COMMERCIAL

## 2022-11-10 ENCOUNTER — HOSPITAL ENCOUNTER (EMERGENCY)
Facility: CLINIC | Age: 52
Discharge: HOME | End: 2022-11-10
Attending: EMERGENCY MEDICINE | Admitting: EMERGENCY MEDICINE
Payer: COMMERCIAL

## 2022-11-10 VITALS
WEIGHT: 220 LBS | RESPIRATION RATE: 18 BRPM | OXYGEN SATURATION: 97 % | HEART RATE: 92 BPM | HEIGHT: 62 IN | BODY MASS INDEX: 40.48 KG/M2 | TEMPERATURE: 98.9 F | DIASTOLIC BLOOD PRESSURE: 92 MMHG | SYSTOLIC BLOOD PRESSURE: 144 MMHG

## 2022-11-10 DIAGNOSIS — J06.9 VIRAL URI WITH COUGH: ICD-10-CM

## 2022-11-10 LAB
FLUAV RNA SPEC QL NAA+PROBE: NEGATIVE
FLUBV RNA RESP QL NAA+PROBE: NEGATIVE
RSV RNA SPEC NAA+PROBE: NEGATIVE
SARS-COV-2 RNA RESP QL NAA+PROBE: NEGATIVE

## 2022-11-10 PROCEDURE — 99284 EMERGENCY DEPT VISIT MOD MDM: CPT | Mod: 25

## 2022-11-10 PROCEDURE — C9803 HOPD COVID-19 SPEC COLLECT: HCPCS

## 2022-11-10 PROCEDURE — 87637 SARSCOV2&INF A&B&RSV AMP PRB: CPT | Performed by: EMERGENCY MEDICINE

## 2022-11-10 PROCEDURE — 71046 X-RAY EXAM CHEST 2 VIEWS: CPT

## 2022-11-10 NOTE — ED PROVIDER NOTES
EMERGENCY DEPARTMENT ENCOUNTER      NAME: Mana Bull  AGE: 52 year old female  YOB: 1970  MRN: [id number]  EVALUATION DATE & TIME: No admission date for patient encounter.    PCP: Abimael Walker    ED PROVIDER: Stephania Delgado MD    Chief Complaint   Patient presents with     Pharyngitis     Nasal Congestion     Cough     Generalized Body Aches         FINAL IMPRESSION:  1. Viral URI with cough          ED COURSE & MEDICAL DECISION MAKING:    Pertinent Labs & Imaging studies reviewed. (See chart for details)  52 year old female with history of HTN, prediabetes who presents to the Emergency Department for evaluation of flulike symptoms since yesterday.  Differential includes viral syndrome, influenza, COVID-19.  She does also have some more coarse breath sounds on the right, clear on the left concerning for possible pneumonia.    Patient seen and evaluated initially by myself in the triage area due to boarding crisis.  Was swabbed for COVID/influenza/flu, all negative.  Chest x-ray showed no signs of pneumonia.  No indications for antibiotics.  Patient counseled and discharged home.     7:00 AM I met the patient and performed my initial interview and exam.     ED Course as of 11/10/22 0906   Thu Nov 10, 2022   0858 Called lab to release chest x-ray imaging so I can view it       Medical Decision Making    Supplemental history from: N/A    External Record(s) Reviewed: Outpatient Record    Differential Diagnosis: See MDM charting for differential considered.     I performed an independent interpretation of the: N/A    Discussed with radiology regarding test interpretation: N/A    Discussion of management with another provider: See ED Course    The following testing was considered but ultimately not selected: None    I considered prescription management with: Antibiotic    The patient's care impacted: Hypertension    Consideration of Admission/Observation: N/A - Patient discharged without  consideration for admission    Care significantly affected by Social Determinants of Health including: N/A    At the conclusion of the encounter I discussed the results of all of the tests and the disposition. The questions were answered. The patient or family acknowledged understanding and was agreeable with the care plan.    MEDICATIONS GIVEN IN THE EMERGENCY:  Medications - No data to display    NEW PRESCRIPTIONS STARTED AT TODAY'S ER VISIT  New Prescriptions    No medications on file          =================================================================    HPI    Patient information was obtained from: patient     Use of Intrepreter: N/A        Mana Bull is a 52 year old female with pertinent medical history of HTN, metabolic syndrome, and alcohol abuse who presents to the ED for evaluation of pharyngitis, fever, and rhinorrhea.    The patient reports flu-like symptoms ongoing since yesterday 11/9 morning. She endorses fevers of 99F-100F, rhinorrhea, pharyngitis, and nonproductive cough. She also notes shortness of breath with exertion. The patient denies taking any medications for her symptoms. 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 She does report being around her 2 nieces who are sick with URI symptoms. Patient denies additional medical concerns or complaints at this time.      SHx: The patient denies current alcohol use, denies any drug use.      REVIEW OF SYSTEMS  Constitutional:  Denies weight loss or weakness. Endorses fever and chills.  HENT:  Denies ear pain. Endorses nasal congestion, rhinorrhea, pharyngitis.  Respiratory: Endorses cough (nonproductive) and shortness of breath (with exertion).  Cardiovascular:  No CP, palpitations  GI:  Denies abdominal pain, nausea, vomiting, diarrhea  Neurologic:  Denies headache  All other systems negative unless noted in  "HPI.      PAST MEDICAL HISTORY:  Past Medical History:   Diagnosis Date     Alcohol abuse      Hypertension 2015     OCD (obsessive compulsive disorder)     mostly resolved.  Tactile symptoms as teenager.     PTSD (post-traumatic stress disorder)      Suspected COVID-19 virus infection 2020       PAST SURGICAL HISTORY:  Past Surgical History:   Procedure Laterality Date     EYE SURGERY         CURRENT MEDICATIONS:    Cannot display prior to admission medications because the patient has not been admitted in this contact.       ALLERGIES:  No Known Allergies    FAMILY HISTORY:  Family History   Problem Relation Age of Onset     Diabetes Mother      Hypertension Mother      Hyperlipidemia Mother      Cerebrovascular Disease Mother      Breast Cancer Mother         late 50s     Obesity Mother      Hypertension Father      Diabetes Father              No Known Problems Sister      Hyperlipidemia Maternal Grandmother              Hyperlipidemia Maternal Grandfather      Diabetes Maternal Grandfather              Hyperlipidemia Paternal Grandmother              Hyperlipidemia Paternal Grandfather              No Known Problems Sister        SOCIAL HISTORY:  Social History     Tobacco Use     Smoking status: Former     Packs/day: 0.00     Years: 20.00     Pack years: 0.00     Types: Cigarettes     Quit date: 2020     Years since quittin.3     Smokeless tobacco: Never   Substance Use Topics     Alcohol use: Not Currently     Comment: OCCASTIONAL     Drug use: No        VITALS:  Patient Vitals for the past 24 hrs:   BP Temp Temp src Pulse Resp SpO2 Height Weight   11/10/22 0700 (!) 144/92 98.9  F (37.2  C) Oral 92 18 97 % 1.575 m (5' 2\") 99.8 kg (220 lb)       PHYSICAL EXAM    General Appearance: Well-appearing, well-nourished, no acute distress   Head:  Normocephalic  Eyes:  conjunctiva/corneas clear  ENT:  membranes are moist without pallor  Neck:  Supple  Cardio:  " Regular rate and rhythm, no murmur/gallop/rub  Pulm:  No respiratory distress. Left side clear, right side coarse to ascultation.  Abdomen: Obese  Extremities: Moves all extremities normally, normal gait  Skin:  Skin warm, dry, no rashes  Neuro:  Alert and oriented ×3, moving all extremities, no gross sensory defects       RADIOLOGY/LABS:  Reviewed all pertinent imaging. Please see official radiology report. All pertinent labs reviewed and interpreted.    Results for orders placed or performed during the hospital encounter of 11/10/22   Chest XR,  PA & LAT    Impression    IMPRESSION: Minimal streaky basilar opacities most suggestive of atelectasis or scarring. Remaining lungs are clear. No focal consolidation. No pleural effusion or pneumothorax. Stable cardiomediastinal silhouette.       Symptomatic; Unknown Influenza A/B & SARS-CoV2 (COVID-19) Virus PCR Multiplex Nasopharyngeal    Specimen: Nasopharyngeal; Swab   Result Value Ref Range    Influenza A PCR Negative Negative    Influenza B PCR Negative Negative    RSV PCR Negative Negative    SARS CoV2 PCR Negative Negative       The creation of this record is based on the scribe s observations of the work being performed by Stephania Delgado MD and the provider s statements to them. It was created on her behalf by Cherelle Maya, a trained medical scribe. This document has been checked and approved by the attending provider.    Stephania Delgado MD  Emergency Medicine  [facility] [place] [place] EMERGENCY ROOM  [address] [id number] [phone]  Dept: [phone]     Stephania Delgado MD  11/10/22 0906

## 2022-11-10 NOTE — Clinical Note
Mana Bull was seen and treated in our emergency department on 11/10/2022.  She may return to work on 11/11/2022.       If you have any questions or concerns, please don't hesitate to call.      Stephania Delgado MD

## 2022-11-10 NOTE — ED TRIAGE NOTES
Pt presents to ED with c/o sore throat, cough, nasal congestion, and fever starting yesterday morning.  Denies breathing problems.  No medications taken PTA.       Triage Assessment     Row Name 11/10/22 0700       Triage Assessment (Adult)    Airway WDL WDL       Respiratory WDL    Respiratory WDL X;cough    Cough Frequency infrequent    Cough Type productive       Skin Circulation/Temperature WDL    Skin Circulation/Temperature WDL X;temperature    Skin Temperature warm       Cardiac WDL    Cardiac WDL WDL       Peripheral/Neurovascular WDL    Peripheral Neurovascular WDL WDL       Cognitive/Neuro/Behavioral WDL    Cognitive/Neuro/Behavioral WDL WDL

## 2023-03-01 DIAGNOSIS — J02.9 SORE THROAT: Primary | ICD-10-CM

## 2023-03-01 DIAGNOSIS — R05.9 COUGH, UNSPECIFIED TYPE: ICD-10-CM

## 2023-03-02 ENCOUNTER — APPOINTMENT (OUTPATIENT)
Dept: LAB | Facility: CLINIC | Age: 53
End: 2023-03-02
Payer: COMMERCIAL

## 2023-03-02 LAB
DEPRECATED S PYO AG THROAT QL EIA: NEGATIVE
FLUAV AG SPEC QL IA: NEGATIVE
FLUBV AG SPEC QL IA: NEGATIVE
GROUP A STREP BY PCR: NOT DETECTED

## 2023-03-02 PROCEDURE — 87651 STREP A DNA AMP PROBE: CPT | Performed by: NURSE PRACTITIONER

## 2023-03-02 PROCEDURE — 87804 INFLUENZA ASSAY W/OPTIC: CPT | Performed by: NURSE PRACTITIONER

## 2023-03-15 ENCOUNTER — MYC REFILL (OUTPATIENT)
Dept: FAMILY MEDICINE | Facility: CLINIC | Age: 53
End: 2023-03-15
Payer: COMMERCIAL

## 2023-03-15 DIAGNOSIS — E88.810 METABOLIC SYNDROME X: ICD-10-CM

## 2023-03-15 DIAGNOSIS — E66.09 CLASS 1 OBESITY DUE TO EXCESS CALORIES WITH SERIOUS COMORBIDITY AND BODY MASS INDEX (BMI) OF 34.0 TO 34.9 IN ADULT: ICD-10-CM

## 2023-03-15 DIAGNOSIS — R73.03 PREDIABETES: ICD-10-CM

## 2023-03-15 DIAGNOSIS — E66.811 CLASS 1 OBESITY DUE TO EXCESS CALORIES WITH SERIOUS COMORBIDITY AND BODY MASS INDEX (BMI) OF 34.0 TO 34.9 IN ADULT: ICD-10-CM

## 2023-03-15 DIAGNOSIS — I10 BENIGN ESSENTIAL HYPERTENSION: ICD-10-CM

## 2023-03-16 ENCOUNTER — MYC MEDICAL ADVICE (OUTPATIENT)
Dept: FAMILY MEDICINE | Facility: CLINIC | Age: 53
End: 2023-03-16
Payer: COMMERCIAL

## 2023-03-16 RX ORDER — PHENTERMINE HYDROCHLORIDE 30 MG/1
30 CAPSULE ORAL EVERY MORNING
Qty: 90 CAPSULE | Refills: 0 | OUTPATIENT
Start: 2023-03-16

## 2023-03-16 NOTE — TELEPHONE ENCOUNTER
"Left message to call back for: patient x1  Information to relay to patient: Patient needs to be seen in clinic before any medication fills.    See provider message below for details and assist moving up appointment date if needed.    \"  Abimael Walker MD 3 hours ago (12:01 PM)     NS  Unclear plan.  History of hypertension.  Refill when appropriate without a clinic visit.          "

## 2023-03-16 NOTE — TELEPHONE ENCOUNTER
Patient states that she is fine with waiting until scheduled appointment to discuss restarting meds. Has appointment for 05/15/23

## 2023-03-31 ENCOUNTER — LAB (OUTPATIENT)
Dept: LAB | Facility: CLINIC | Age: 53
End: 2023-03-31
Payer: COMMERCIAL

## 2023-03-31 ENCOUNTER — TELEPHONE (OUTPATIENT)
Dept: INTERNAL MEDICINE | Facility: CLINIC | Age: 53
End: 2023-03-31
Payer: COMMERCIAL

## 2023-03-31 DIAGNOSIS — J02.9 SORE THROAT: Primary | ICD-10-CM

## 2023-03-31 DIAGNOSIS — J02.9 SORE THROAT: ICD-10-CM

## 2023-03-31 LAB
DEPRECATED S PYO AG THROAT QL EIA: NEGATIVE
GROUP A STREP BY PCR: NOT DETECTED

## 2023-03-31 PROCEDURE — 87651 STREP A DNA AMP PROBE: CPT

## 2023-04-15 ENCOUNTER — HEALTH MAINTENANCE LETTER (OUTPATIENT)
Age: 53
End: 2023-04-15

## 2023-05-22 ENCOUNTER — OFFICE VISIT (OUTPATIENT)
Dept: FAMILY MEDICINE | Facility: CLINIC | Age: 53
End: 2023-05-22
Payer: COMMERCIAL

## 2023-05-22 VITALS
HEART RATE: 77 BPM | RESPIRATION RATE: 16 BRPM | SYSTOLIC BLOOD PRESSURE: 123 MMHG | HEIGHT: 62 IN | OXYGEN SATURATION: 98 % | DIASTOLIC BLOOD PRESSURE: 88 MMHG | WEIGHT: 206.44 LBS | TEMPERATURE: 98.4 F | BODY MASS INDEX: 37.99 KG/M2

## 2023-05-22 DIAGNOSIS — E66.812 CLASS 2 SEVERE OBESITY DUE TO EXCESS CALORIES WITH SERIOUS COMORBIDITY AND BODY MASS INDEX (BMI) OF 37.0 TO 37.9 IN ADULT (H): Primary | ICD-10-CM

## 2023-05-22 DIAGNOSIS — E88.810 METABOLIC SYNDROME X: ICD-10-CM

## 2023-05-22 DIAGNOSIS — E66.01 CLASS 2 SEVERE OBESITY DUE TO EXCESS CALORIES WITH SERIOUS COMORBIDITY AND BODY MASS INDEX (BMI) OF 37.0 TO 37.9 IN ADULT (H): Primary | ICD-10-CM

## 2023-05-22 DIAGNOSIS — Z12.4 CERVICAL CANCER SCREENING: ICD-10-CM

## 2023-05-22 DIAGNOSIS — Z11.59 ENCOUNTER FOR HEPATITIS C SCREENING TEST FOR LOW RISK PATIENT: ICD-10-CM

## 2023-05-22 DIAGNOSIS — Z12.11 SCREEN FOR COLON CANCER: ICD-10-CM

## 2023-05-22 DIAGNOSIS — E78.00 HYPERCHOLESTEREMIA: ICD-10-CM

## 2023-05-22 DIAGNOSIS — Z12.31 VISIT FOR SCREENING MAMMOGRAM: ICD-10-CM

## 2023-05-22 DIAGNOSIS — Z11.4 SCREENING FOR HIV WITHOUT PRESENCE OF RISK FACTORS: ICD-10-CM

## 2023-05-22 DIAGNOSIS — I10 BENIGN ESSENTIAL HYPERTENSION: ICD-10-CM

## 2023-05-22 DIAGNOSIS — E11.9 TYPE 2 DIABETES MELLITUS WITHOUT COMPLICATION, WITHOUT LONG-TERM CURRENT USE OF INSULIN (H): ICD-10-CM

## 2023-05-22 LAB
ALT SERPL W P-5'-P-CCNC: 17 U/L (ref 10–35)
ANION GAP SERPL CALCULATED.3IONS-SCNC: 13 MMOL/L (ref 7–15)
BUN SERPL-MCNC: 6.9 MG/DL (ref 6–20)
CALCIUM SERPL-MCNC: 9.8 MG/DL (ref 8.6–10)
CHLORIDE SERPL-SCNC: 101 MMOL/L (ref 98–107)
CHOLEST SERPL-MCNC: 255 MG/DL
CREAT SERPL-MCNC: 0.67 MG/DL (ref 0.51–0.95)
DEPRECATED HCO3 PLAS-SCNC: 25 MMOL/L (ref 22–29)
GFR SERPL CREATININE-BSD FRML MDRD: >90 ML/MIN/1.73M2
GLUCOSE SERPL-MCNC: 129 MG/DL (ref 70–99)
HBA1C MFR BLD: 6.5 % (ref 0–5.6)
HCV AB SERPL QL IA: NONREACTIVE
HDLC SERPL-MCNC: 49 MG/DL
HIV 1+2 AB+HIV1 P24 AG SERPL QL IA: NONREACTIVE
LDLC SERPL CALC-MCNC: 177 MG/DL
NONHDLC SERPL-MCNC: 206 MG/DL
POTASSIUM SERPL-SCNC: 5 MMOL/L (ref 3.4–5.3)
SODIUM SERPL-SCNC: 139 MMOL/L (ref 136–145)
TRIGL SERPL-MCNC: 145 MG/DL

## 2023-05-22 PROCEDURE — 84460 ALANINE AMINO (ALT) (SGPT): CPT | Performed by: FAMILY MEDICINE

## 2023-05-22 PROCEDURE — 99215 OFFICE O/P EST HI 40 MIN: CPT | Performed by: FAMILY MEDICINE

## 2023-05-22 PROCEDURE — 36415 COLL VENOUS BLD VENIPUNCTURE: CPT | Performed by: FAMILY MEDICINE

## 2023-05-22 PROCEDURE — 87389 HIV-1 AG W/HIV-1&-2 AB AG IA: CPT | Performed by: FAMILY MEDICINE

## 2023-05-22 PROCEDURE — 80061 LIPID PANEL: CPT | Performed by: FAMILY MEDICINE

## 2023-05-22 PROCEDURE — 80048 BASIC METABOLIC PNL TOTAL CA: CPT | Performed by: FAMILY MEDICINE

## 2023-05-22 PROCEDURE — 86803 HEPATITIS C AB TEST: CPT | Performed by: FAMILY MEDICINE

## 2023-05-22 PROCEDURE — 83036 HEMOGLOBIN GLYCOSYLATED A1C: CPT | Performed by: FAMILY MEDICINE

## 2023-05-22 RX ORDER — ASPIRIN 81 MG/1
81 TABLET ORAL DAILY
Qty: 3 TABLET | Status: ON HOLD | COMMUNITY
Start: 2023-05-22 | End: 2024-09-13

## 2023-05-22 ASSESSMENT — ENCOUNTER SYMPTOMS: CONSTITUTIONAL NEGATIVE: 1

## 2023-05-22 NOTE — ASSESSMENT & PLAN NOTE
Diabetes is a new diagnosis today although she has had measurements in the past in the diabetic range.  We reviewed dietary considerations with respect to her history of metabolic syndrome.  I believe that she does require additional therapy and that she is high risk for dramatically worsening diabetes.  - Trial of semaglutide.  No personal/family history of thyroid cancer.  No personal history of pancreatitis.  We will opt for the Wegovy formulation as I believe she will get an insurance benefit and achieve more weight loss on this formulation.  - Start aspirin.  - I will likely start a cholesterol-lowering medicine after I review her laboratory testing.  This testing will be available tonight/tomorrow.  - Non-smoker.  - Blood pressure controlled.  - Recheck hemoglobin A1c in 3-6 months.

## 2023-05-22 NOTE — PROGRESS NOTES
Assessment & Plan   Problem List Items Addressed This Visit     Benign essential hypertension    Relevant Medications    Semaglutide-Weight Management (WEGOVY) 0.25 MG/0.5ML pen    Other Relevant Orders    Basic metabolic panel  (Ca, Cl, CO2, Creat, Gluc, K, Na, BUN)    Class 2 severe obesity due to excess calories with serious comorbidity and body mass index (BMI) of 37.0 to 37.9 in adult (H) - Primary     52 year old year old female in clinic today to discuss treatment of the following conditions through diet and lifestyle modification and weight loss:  1. Class 2 severe obesity due to excess calories with serious comorbidity and body mass index (BMI) of 37.0 to 37.9 in adult (H)    2. Screen for colon cancer    3. Cervical cancer screening    4. Visit for screening mammogram    5. Metabolic syndrome X    6. Benign essential hypertension    7. Type 2 diabetes mellitus without complication, without long-term current use of insulin (H)    8. Hypercholesteremia    9. Encounter for hepatitis C screening test for low risk patient    10. Screening for HIV without presence of risk factors      Restart: Patient presents for discussion of metabolic health.  She has struggled with weight gain and subsequent weight loss but has been struggling to achieve more weight loss.  Her living situation is better than it has been in the past.  She lives independently.  She has a job that she finds stressful but 1 that is stable.  She has been working to reduce sugar consumption.  - Trial of Wegovy.  She will participate in comprehensive weight management.  Consider referral to the bariatric dietitian.  - Follow-up 8-10 weeks recommended.         Relevant Medications    Semaglutide-Weight Management (WEGOVY) 0.25 MG/0.5ML pen    Metabolic syndrome X    Relevant Medications    Semaglutide-Weight Management (WEGOVY) 0.25 MG/0.5ML pen    Other Relevant Orders    Basic metabolic panel  (Ca, Cl, CO2, Creat, Gluc, K, Na, BUN)    ALT     Hemoglobin A1c (Completed)    Lipid panel reflex to direct LDL Fasting    Type 2 diabetes mellitus without complication, without long-term current use of insulin (H)     Diabetes is a new diagnosis today although she has had measurements in the past in the diabetic range.  We reviewed dietary considerations with respect to her history of metabolic syndrome.  I believe that she does require additional therapy and that she is high risk for dramatically worsening diabetes.  - Trial of semaglutide.  No personal/family history of thyroid cancer.  No personal history of pancreatitis.  We will opt for the Wegovy formulation as I believe she will get an insurance benefit and achieve more weight loss on this formulation.  - Start aspirin.  - I will likely start a cholesterol-lowering medicine after I review her laboratory testing.  This testing will be available tonight/tomorrow.  - Non-smoker.  - Blood pressure controlled.  - Recheck hemoglobin A1c in 3-6 months.         Relevant Medications    Semaglutide-Weight Management (WEGOVY) 0.25 MG/0.5ML pen    Other Relevant Orders    Basic metabolic panel  (Ca, Cl, CO2, Creat, Gluc, K, Na, BUN)    ALT    Hemoglobin A1c (Completed)   Other Visit Diagnoses     Screen for colon cancer        Relevant Orders    Colonoscopy Screening  Referral    Cervical cancer screening        Visit for screening mammogram        Relevant Orders    MA SCREENING DIGITAL BILAT - Future  (s+30)    Hypercholesteremia        Relevant Medications    Semaglutide-Weight Management (WEGOVY) 0.25 MG/0.5ML pen    Other Relevant Orders    Lipid panel reflex to direct LDL Fasting    Encounter for hepatitis C screening test for low risk patient        Relevant Orders    Hepatitis C Screen Reflex to HCV RNA Quant and Genotype    Screening for HIV without presence of risk factors        Relevant Orders    HIV Antigen Antibody Combo         40 minutes spent by me on the date of the encounter doing chart  "review, history and exam, documentation and further activities per the note     BMI:   Estimated body mass index is 37.76 kg/m  as calculated from the following:    Height as of this encounter: 1.575 m (5' 2\").    Weight as of this encounter: 93.6 kg (206 lb 7 oz).   Weight management plan: Specific weight management program called Comprehensive weight management through [facility] discussed    Abimael Walker MD  [facility] KATIE Vital is a 52 year old, presenting for the following health issues:  Weight Loss (Follow-up/)        5/22/2023     9:06 AM   Additional Questions   Roomed by sac   Accompanied by self         5/22/2023     9:06 AM   Patient Reported Additional Medications   Patient reports taking the following new medications no     Patient presents for treatment of chronic, comorbid conditions using intensive therapeutic lifestyle interventions including nutrition, physical activity, and behavior management.   - RESTART   - sober: she completed laith.  Sober for a year. She lives in her own place (close to work).  \"Work is still stressful.\"  SHe has meetings and a therapist. She replaced alcohol with eating.     - previously successful with ketogenic nutrition.  She went back to soda and sweats.  \"What I am eating.\"     - she is down ~20 lbs in the past couple of months   - struggles: ~5 lbs weight regain.     - exercise plan: \"cardio drumming.\"    - tracking/journaling: aware of calories      History of Present Illness       Reason for visit:  Weight Loss    She eats 2-3 servings of fruits and vegetables daily.She consumes 2 sweetened beverage(s) daily. She exercises with enough effort to increase her heart rate 3 or less days per week.   She is taking medications regularly.    Review of Systems   Constitutional: Negative.    All other systems reviewed and are negative.         Objective    /88 (BP Location: Left arm, Patient Position: Sitting, Cuff " "Size: Adult Large)   Pulse 77   Temp 98.4  F (36.9  C) (Oral)   Resp 16   Ht 1.575 m (5' 2\")   Wt 93.6 kg (206 lb 7 oz)   SpO2 98%   BMI 37.76 kg/m    Body mass index is 37.76 kg/m .  Physical Exam  Nursing note reviewed.   Constitutional:       General: She is not in acute distress.     Appearance: Normal appearance. She is not ill-appearing.   HENT:      Head: Normocephalic and atraumatic.   Eyes:      Extraocular Movements: Extraocular movements intact.      Conjunctiva/sclera: Conjunctivae normal.   Pulmonary:      Effort: Pulmonary effort is normal.   Neurological:      Mental Status: She is alert and oriented to person, place, and time.   Psychiatric:         Attention and Perception: Attention normal.         Mood and Affect: Mood normal.         Speech: Speech normal.         Thought Content: Thought content normal.                  "

## 2023-05-22 NOTE — ASSESSMENT & PLAN NOTE
52 year old year old female in clinic today to discuss treatment of the following conditions through diet and lifestyle modification and weight loss:  1. Class 2 severe obesity due to excess calories with serious comorbidity and body mass index (BMI) of 37.0 to 37.9 in adult (H)    2. Screen for colon cancer    3. Cervical cancer screening    4. Visit for screening mammogram    5. Metabolic syndrome X    6. Benign essential hypertension    7. Type 2 diabetes mellitus without complication, without long-term current use of insulin (H)    8. Hypercholesteremia    9. Encounter for hepatitis C screening test for low risk patient    10. Screening for HIV without presence of risk factors      Restart: Patient presents for discussion of metabolic health.  She has struggled with weight gain and subsequent weight loss but has been struggling to achieve more weight loss.  Her living situation is better than it has been in the past.  She lives independently.  She has a job that she finds stressful but 1 that is stable.  She has been working to reduce sugar consumption.  - Trial of Wegovy.  She will participate in comprehensive weight management.  Consider referral to the bariatric dietitian.  - Follow-up 8-10 weeks recommended.

## 2023-05-23 RX ORDER — ROSUVASTATIN CALCIUM 10 MG/1
10 TABLET, COATED ORAL DAILY
Qty: 90 TABLET | Refills: 1 | Status: SHIPPED | OUTPATIENT
Start: 2023-05-23

## 2023-05-25 ENCOUNTER — TELEPHONE (OUTPATIENT)
Dept: FAMILY MEDICINE | Facility: CLINIC | Age: 53
End: 2023-05-25
Payer: COMMERCIAL

## 2023-05-25 NOTE — TELEPHONE ENCOUNTER
Prior Authorization Request  Who s requesting:  Pharmacy  Pharmacy Name and Location: [facility] [address] [place] [address]  Medication Name: Semaglutide-Weight Management (WEGOVY) 0.25 MG/0.5ML pen  Insurance Plan: [facility] CORE  Insurance Member ID Number:  [id number]  CoverMyMeds Key: [id number]  Informed patient that prior authorizations can take up to 10 business days for response:   Yes  Okay to leave a detailed message: Yes     Route to pool:  KYLAH HODGES MED

## 2023-05-27 NOTE — TELEPHONE ENCOUNTER
Prior Authorization Approval    Medication: WEGOVY 0.25 MG/0.5ML SC SOAJ  Authorization Effective Date: 5/26/2023  Authorization Expiration Date: 12/15/2023  Approved Dose/Quantity:   Reference #:     Insurance Company: [employer] - Phone [phone] Fax [phone]  Expected CoPay:       CoPay Card Available:      Financial Assistance Needed:   Which Pharmacy is filling the prescription: [facility] [address] [place] [address]  Pharmacy Notified: Yes  Patient Notified: Yes

## 2023-05-30 ENCOUNTER — MYC MEDICAL ADVICE (OUTPATIENT)
Dept: FAMILY MEDICINE | Facility: CLINIC | Age: 53
End: 2023-05-30
Payer: COMMERCIAL

## 2023-05-30 DIAGNOSIS — E78.00 HYPERCHOLESTEREMIA: ICD-10-CM

## 2023-05-30 DIAGNOSIS — E66.812 CLASS 2 SEVERE OBESITY DUE TO EXCESS CALORIES WITH SERIOUS COMORBIDITY AND BODY MASS INDEX (BMI) OF 37.0 TO 37.9 IN ADULT (H): ICD-10-CM

## 2023-05-30 DIAGNOSIS — E66.01 CLASS 2 SEVERE OBESITY DUE TO EXCESS CALORIES WITH SERIOUS COMORBIDITY AND BODY MASS INDEX (BMI) OF 37.0 TO 37.9 IN ADULT (H): ICD-10-CM

## 2023-05-30 DIAGNOSIS — E11.9 TYPE 2 DIABETES MELLITUS WITHOUT COMPLICATION, WITHOUT LONG-TERM CURRENT USE OF INSULIN (H): ICD-10-CM

## 2023-05-30 DIAGNOSIS — I10 BENIGN ESSENTIAL HYPERTENSION: ICD-10-CM

## 2023-05-30 DIAGNOSIS — E88.810 METABOLIC SYNDROME X: ICD-10-CM

## 2023-06-05 ENCOUNTER — HOSPITAL ENCOUNTER (OUTPATIENT)
Dept: MAMMOGRAPHY | Facility: CLINIC | Age: 53
Discharge: HOME | End: 2023-06-05
Attending: FAMILY MEDICINE | Admitting: FAMILY MEDICINE
Payer: COMMERCIAL

## 2023-06-05 DIAGNOSIS — Z12.31 VISIT FOR SCREENING MAMMOGRAM: ICD-10-CM

## 2023-06-05 PROCEDURE — 77067 SCR MAMMO BI INCL CAD: CPT

## 2023-06-07 ENCOUNTER — TELEPHONE (OUTPATIENT)
Dept: FAMILY MEDICINE | Facility: CLINIC | Age: 53
End: 2023-06-07
Payer: COMMERCIAL

## 2023-06-07 NOTE — TELEPHONE ENCOUNTER
Prior Authorization Request  Who s requesting:  Pharmacy  Pharmacy Name and Location: [facility] #[id number] [place] [address]  Medication Name: liraglutide - Weight Management (SAXENDA) 18 MG/3ML pen  Insurance Plan: [facility] CORE  Insurance Member ID Number:  [id number]  CoverMyMeds Key: NA  Informed patient that prior authorizations can take up to 10 business days for response:   NA  Okay to leave a detailed message: No     Route to pool:  KYLAH HODGES MED

## 2023-06-13 NOTE — TELEPHONE ENCOUNTER
Central Prior Authorization Team   Phone: [phone]    PA Initiation    Medication: Saxenda  Insurance Company: [employer] - Phone [phone] Fax [phone]  Pharmacy Filling the Rx: [facility] #[id number] [place], [place] - [address] [place] [address]  AT [facility]  Filling Pharmacy Phone: [phone]  Filling Pharmacy Fax:    Start Date: 6/13/2023

## 2023-06-15 NOTE — TELEPHONE ENCOUNTER
Prior Authorization Approval    Authorization Effective Date: 6/14/2023  Authorization Expiration Date: 10/4/2023  Medication: Saxenda  Approved Dose/Quantity:    Reference #:     Insurance Company: [employer] - Phone [phone] Fax [phone]  Expected CoPay:       CoPay Card Available:      Foundation Assistance Needed:    Which Pharmacy is filling the prescription (Not needed for infusion/clinic administered): [facility] #[id number] [place] [address] [address] AT [facility] OF [facility]  Pharmacy Notified: Yes  Patient Notified: Yes

## 2023-06-28 ENCOUNTER — OFFICE VISIT (OUTPATIENT)
Dept: FAMILY MEDICINE | Facility: CLINIC | Age: 53
End: 2023-06-28
Payer: COMMERCIAL

## 2023-06-28 VITALS
RESPIRATION RATE: 16 BRPM | TEMPERATURE: 99.5 F | SYSTOLIC BLOOD PRESSURE: 132 MMHG | DIASTOLIC BLOOD PRESSURE: 88 MMHG | HEART RATE: 92 BPM | OXYGEN SATURATION: 97 %

## 2023-06-28 DIAGNOSIS — J06.9 VIRAL URI: Primary | ICD-10-CM

## 2023-06-28 DIAGNOSIS — R06.2 WHEEZING: ICD-10-CM

## 2023-06-28 PROCEDURE — 99213 OFFICE O/P EST LOW 20 MIN: CPT | Performed by: FAMILY MEDICINE

## 2023-06-28 RX ORDER — ALBUTEROL SULFATE 90 UG/1
2 AEROSOL, METERED RESPIRATORY (INHALATION) EVERY 6 HOURS PRN
Qty: 8.5 G | Refills: 11 | Status: SHIPPED | OUTPATIENT
Start: 2023-06-28 | End: 2023-07-31

## 2023-06-28 RX ORDER — BENZONATATE 200 MG/1
200 CAPSULE ORAL 3 TIMES DAILY PRN
Qty: 21 CAPSULE | Refills: 1 | Status: SHIPPED | OUTPATIENT
Start: 2023-06-28 | End: 2023-07-31

## 2023-06-28 NOTE — PATIENT INSTRUCTIONS
Pseudoephdrine as needed for congestion.  Oxymetozaline nasal spray as needed for congestion no longer than 3 days.  Acetominaphen or ibuprofen as needed for pain or fever.  Dextromethorphan as needed for cough.  Saline nasal spray.  Warm moist compresses to face.  Warm humidified air.  F/U if worsening or not improving.     Albuterol inhaler instructions given.  2 puffs TID for 10-14 days and prn and then as needed..     Benzonatate as needed for cough.

## 2023-06-28 NOTE — PROGRESS NOTES
OUTPATIENT VISIT NOTE                                                   Date of Visit: 2023     Chief Complaint   Patient presents with:  Cough: Has had  cough congestion for 3 days  had negative COVID test yesterday low grade temp            History of Present Illness   Mana Bull is a 52 year old female c/o frequent cough.  Runny, stuffy nose.  Achy.  Temp to 99.5.  Negative home covid test.  Has taken dayquil.  Smokes but not during this.  No h/o asthma         MEDICATIONS   Current Outpatient Medications   Medication     albuterol (PROAIR HFA/PROVENTIL HFA/VENTOLIN HFA) 108 (90 Base) MCG/ACT inhaler     aspirin 81 MG EC tablet     benzonatate (TESSALON) 200 MG capsule     liraglutide - Weight Management (SAXENDA) 18 MG/3ML pen     rosuvastatin (CRESTOR) 10 MG tablet     insulin pen needle (32G X 4 MM) 32G X 4 MM miscellaneous     No current facility-administered medications for this visit.         SOCIAL HISTORY   Social History     Tobacco Use     Smoking status: Former     Packs/day: 0.00     Years: 20.00     Pack years: 0.00     Types: Cigarettes     Quit date: 2020     Years since quittin.9     Smokeless tobacco: Never   Substance Use Topics     Alcohol use: Not Currently     Comment: OCCASTIONAL           Physical Exam   Vitals:    23 1005   BP: 132/88   Pulse: 92   Resp: 16   Temp: 99.5  F (37.5  C)   TempSrc: Oral   SpO2: 97%        GENERAL:   Alert. Oriented.  EYES: Clear  HENT:  Ears: R TM pearly gray. Normal landmarks. L TM pearly gray.  Normal landmarks  Nose: Clear.  Sinuses: Nontender.  Oropharynx:  No erythema. No exudate.  NECK: Supple. No adenopathy.  LUNGS:few rhonchi RUL, scattered expiratory wheezing.  HEART: RRR  SKIN:  No rash.          Assessment and Plan     Viral URI  - benzonatate (TESSALON) 200 MG capsule  Dispense: 21 capsule; Refill: 1    Wheezing  - albuterol (PROAIR HFA/PROVENTIL HFA/VENTOLIN HFA) 108 (90 Base) MCG/ACT inhaler  Dispense: 8.5 g; Refill:  11     No respiratory distress.  Pseudoephdrine as needed for congestion.  Oxymetozaline nasal spray as needed for congestion no longer than 3 days.  Acetominaphen or ibuprofen as needed for pain or fever.  Dextromethorphan as needed for cough.  Saline nasal spray.  Warm moist compresses to face.  Warm humidified air.  F/U if worsening or not improving.     Benzonatate for cough.    Albuterol inhaler instructions given.  2 puffs TID for 10-14 days and prn and then as needed.    Advised to quit smoking.              Discussed signs / symptoms that warrant urgent / emergent medical attention.     Recheck if worsening or not improving.       Gillian Luque MD          Pertinent History     The following portions of the patient's history were reviewed and updated as appropriate: allergies, current medications, past family history, past medical history, past social history, past surgical history and problem list.

## 2023-06-28 NOTE — LETTER
June 28, 2023      Mana Bull  [address] [facility] [address]        To Whom It May Concern:    Mana Bull  was seen on 6/28/2023 .  Please excuse her.        Sincerely,        Gillian Luque MD

## 2023-07-13 ENCOUNTER — MYC MEDICAL ADVICE (OUTPATIENT)
Dept: FAMILY MEDICINE | Facility: CLINIC | Age: 53
End: 2023-07-13
Payer: COMMERCIAL

## 2023-07-13 DIAGNOSIS — R63.2 BINGE EATING: Primary | ICD-10-CM

## 2023-07-13 DIAGNOSIS — E11.9 TYPE 2 DIABETES MELLITUS WITHOUT COMPLICATION, WITHOUT LONG-TERM CURRENT USE OF INSULIN (H): ICD-10-CM

## 2023-07-13 DIAGNOSIS — E66.01 CLASS 2 SEVERE OBESITY DUE TO EXCESS CALORIES WITH SERIOUS COMORBIDITY AND BODY MASS INDEX (BMI) OF 37.0 TO 37.9 IN ADULT (H): ICD-10-CM

## 2023-07-13 DIAGNOSIS — E88.810 METABOLIC SYNDROME X: ICD-10-CM

## 2023-07-13 DIAGNOSIS — E66.812 CLASS 2 SEVERE OBESITY DUE TO EXCESS CALORIES WITH SERIOUS COMORBIDITY AND BODY MASS INDEX (BMI) OF 37.0 TO 37.9 IN ADULT (H): ICD-10-CM

## 2023-07-13 NOTE — TELEPHONE ENCOUNTER
Please advise how to proceed, patient unable to locate stock of saxenda (has tried multiple pharmacies)

## 2023-07-14 RX ORDER — SEMAGLUTIDE 1.7 MG/.75ML
1.7 INJECTION, SOLUTION SUBCUTANEOUS WEEKLY
Qty: 3 ML | Refills: 0 | Status: SHIPPED | OUTPATIENT
Start: 2023-07-14 | End: 2023-07-14

## 2023-07-17 ENCOUNTER — TELEPHONE (OUTPATIENT)
Dept: FAMILY MEDICINE | Facility: CLINIC | Age: 53
End: 2023-07-17
Payer: COMMERCIAL

## 2023-07-17 NOTE — TELEPHONE ENCOUNTER
Prior Authorization Request  Who s requesting:  Pharmacy  Pharmacy Name and Location: [facility] [address] [place] [address]   Medication Name: Semaglutide, 1 MG/DOSE, (OZEMPIC) 4 MG/3ML pen  Insurance Plan: [facility] CORE  Insurance Member ID Number:  [id number]  CoverMyMeds Key: [id number]  Informed patient that prior authorizations can take up to 10 business days for response:   NA  Okay to leave a detailed message: No     Route to pool:  KYLAH HODGES MED

## 2023-07-20 NOTE — TELEPHONE ENCOUNTER
Central Prior Authorization Team - Phone: [phone]     PA Initiation    Medication: OZEMPIC (1 MG/DOSE) 4 MG/3ML SC SOPN  Insurance Company: [employer] - Phone [phone] Fax [phone]  Pharmacy Filling the Rx: [facility] #[id number] [place], [place] - [facility]  AT [facility]  Filling Pharmacy Phone: [phone]  Filling Pharmacy Fax:    Start Date: 7/20/2023

## 2023-07-24 ENCOUNTER — TELEPHONE (OUTPATIENT)
Dept: FAMILY MEDICINE | Facility: CLINIC | Age: 53
End: 2023-07-24

## 2023-07-24 NOTE — TELEPHONE ENCOUNTER
Reason for Call:  Appointment Request    Patient requesting this type of appt:  other    Requested provider: Abimael Walker    Reason patient unable to be scheduled: Not within requested timeframe    When does patient want to be seen/preferred time: 3-7 days    Comments: 7/31 video virtual     Could we send this information to you in [employer] or would you prefer to receive a phone call?:   Patient would prefer a phone call   Okay to leave a detailed message?: Yes at Cell number on file:    Telephone Information:   Mobile [phone]       Call taken on 7/24/2023 at 11:17 AM by Coby Cerda

## 2023-07-24 NOTE — TELEPHONE ENCOUNTER
Central Prior Authorization Team - Phone: [phone]     Prior Authorization Approval    Medication: OZEMPIC (1 MG/DOSE) 4 MG/3ML SC SOPN  Authorization Effective Date: 7/20/2023  Authorization Expiration Date: 7/19/2024  Approved Dose/Quantity: 3  Reference #:     Insurance Company: [facility] - Phone [phone] Fax [phone]  Expected CoPay:       CoPay Card Available:      Financial Assistance Needed:   Which Pharmacy is filling the prescription: [facility] #[id number] [address]  AT [facility] OF [facility]  Pharmacy Notified: Yes  Patient Notified: Yes pharmacy will notify when ready

## 2023-07-27 ENCOUNTER — MYC MEDICAL ADVICE (OUTPATIENT)
Dept: FAMILY MEDICINE | Facility: CLINIC | Age: 53
End: 2023-07-27
Payer: COMMERCIAL

## 2023-07-31 ENCOUNTER — TELEPHONE (OUTPATIENT)
Dept: FAMILY MEDICINE | Facility: CLINIC | Age: 53
End: 2023-07-31

## 2023-07-31 ENCOUNTER — VIRTUAL VISIT (OUTPATIENT)
Dept: FAMILY MEDICINE | Facility: CLINIC | Age: 53
End: 2023-07-31
Payer: COMMERCIAL

## 2023-07-31 DIAGNOSIS — F10.11 HISTORY OF ALCOHOL ABUSE: ICD-10-CM

## 2023-07-31 DIAGNOSIS — E88.810 METABOLIC SYNDROME X: ICD-10-CM

## 2023-07-31 DIAGNOSIS — E66.812 CLASS 2 SEVERE OBESITY DUE TO EXCESS CALORIES WITH SERIOUS COMORBIDITY AND BODY MASS INDEX (BMI) OF 37.0 TO 37.9 IN ADULT (H): ICD-10-CM

## 2023-07-31 DIAGNOSIS — E11.9 TYPE 2 DIABETES MELLITUS WITHOUT COMPLICATION, WITHOUT LONG-TERM CURRENT USE OF INSULIN (H): Primary | ICD-10-CM

## 2023-07-31 DIAGNOSIS — E66.01 CLASS 2 SEVERE OBESITY DUE TO EXCESS CALORIES WITH SERIOUS COMORBIDITY AND BODY MASS INDEX (BMI) OF 37.0 TO 37.9 IN ADULT (H): ICD-10-CM

## 2023-07-31 PROCEDURE — 99213 OFFICE O/P EST LOW 20 MIN: CPT | Mod: VID | Performed by: FAMILY MEDICINE

## 2023-07-31 RX ORDER — ONDANSETRON 4 MG/1
4 TABLET, FILM COATED ORAL EVERY 8 HOURS PRN
Qty: 45 TABLET | Refills: 0 | Status: SHIPPED | OUTPATIENT
Start: 2023-07-31 | End: 2024-02-09

## 2023-07-31 ASSESSMENT — ENCOUNTER SYMPTOMS: CONSTITUTIONAL NEGATIVE: 1

## 2023-07-31 NOTE — ASSESSMENT & PLAN NOTE
52 year old year old female in clinic today to discuss treatment of the following conditions through diet and lifestyle modification and weight loss:  1. Type 2 diabetes mellitus without complication, without long-term current use of insulin (H)    2. Class 2 severe obesity due to excess calories with serious comorbidity and body mass index (BMI) of 37.0 to 37.9 in adult (H)    3. Metabolic syndrome X      The patient's weight loss result since the last visit was mixed based on appetite improvement. She has severe side effects while on liraglutide.  She has also struggled with hunger.     - switch to ozempic. Plan to stay at 1mg/wk dose for 2 months before considering move back to wegovy 1.7mg.    - follow-up 6 weeks.    - alcoholism: none. Some cravings that come and go.  Better with liraglutide.

## 2023-07-31 NOTE — TELEPHONE ENCOUNTER
Patient Quality Outreach    Patient is due for the following:   Cervical Cancer Screening - PAP Needed, Diabetes and Colonoscopy EYE    Next Steps:   No follow up needed at this time.    Type of outreach:    Patient notified HMP due.      Questions for provider review:    None           Lauren Alfaro

## 2023-07-31 NOTE — PROGRESS NOTES
Zahraa is a 52 year old who is being evaluated via a billable video visit.      How would you like to obtain your AVS? MyChart  If the video visit is dropped, the invitation should be resent by: Text to cell phone: [phone]  Will anyone else be joining your video visit? No    Assessment & Plan   Problem List Items Addressed This Visit       Class 2 severe obesity due to excess calories with serious comorbidity and body mass index (BMI) of 37.0 to 37.9 in adult (H)     52 year old year old female in clinic today to discuss treatment of the following conditions through diet and lifestyle modification and weight loss:  1. Type 2 diabetes mellitus without complication, without long-term current use of insulin (H)    2. Class 2 severe obesity due to excess calories with serious comorbidity and body mass index (BMI) of 37.0 to 37.9 in adult (H)    3. Metabolic syndrome X    The patient's weight loss result since the last visit was mixed based on appetite improvement. She has severe side effects while on liraglutide.  She has also struggled with hunger.     - switch to ozempic. Plan to stay at 1mg/wk dose for 2 months before considering move back to wegovy 1.7mg.    - follow-up 6 weeks.    - alcoholism: none. Some cravings that come and go.  Better with liraglutide.          Relevant Medications    ondansetron (ZOFRAN) 4 MG tablet    History of alcohol abuse     Almost 2 years without alcohol.          Metabolic syndrome X    Relevant Medications    ondansetron (ZOFRAN) 4 MG tablet    Type 2 diabetes mellitus without complication, without long-term current use of insulin (H) - Primary    Relevant Medications    ondansetron (ZOFRAN) 4 MG tablet       Abimael Walker MD  [facility]   Zahraa is a 52 year old, presenting for the following health issues:  Weight Loss (Follow-up/)        7/31/2023    10:07 AM   Additional Questions   Roomed by sac   Accompanied by self         7/31/2023  "   10:07 AM   Patient Reported Additional Medications   Patient reports taking the following new medications no       Patient presents for treatment of chronic, comorbid conditions using intensive therapeutic lifestyle interventions including nutrition, physical activity, and behavior management.   - successes/struggles: on saxenda.  Up to 2.4mg/day. headache, nausea, fatigue.  Appetite is diminished.  She believes that her insurance covers ozempic.    - exercise plan: active on job   - tracking/journaling: ad esther.  \"Trying to diet.\"  She has done better with not snacking at work.    - following nutritional plan: improved.    - hunger: suppressed   - medication benefits: ? side effects: nausea      History of Present Illness       Reason for visit:  Follow up    She eats 4 or more servings of fruits and vegetables daily.She consumes 2 sweetened beverage(s) daily.She exercises with enough effort to increase her heart rate 30 to 60 minutes per day.  She exercises with enough effort to increase her heart rate 3 or less days per week.   She is taking medications regularly.    Review of Systems   Constitutional: Negative.    All other systems reviewed and are negative.           Objective    Vitals - Patient Reported  Weight (Patient Reported): 97.1 kg (214 lb 2 oz)  Height (Patient Reported): 157.5 cm (5' 2\")  BMI (Based on Pt Reported Ht/Wt): 39.16        Physical Exam  Nursing note reviewed.   Constitutional:       General: She is not in acute distress.     Appearance: Normal appearance. She is not ill-appearing.   HENT:      Head: Normocephalic and atraumatic.   Eyes:      Extraocular Movements: Extraocular movements intact.      Conjunctiva/sclera: Conjunctivae normal.   Pulmonary:      Effort: Pulmonary effort is normal.   Neurological:      Mental Status: She is alert and oriented to person, place, and time.   Psychiatric:         Attention and Perception: Attention normal.         Mood and Affect: Mood normal.    "      Speech: Speech normal.         Thought Content: Thought content normal.             Video-Visit Details    Type of service:  Video Visit     Originating Location (pt. Location): Home  Distant Location (provider location):  On-site  Platform used for Video Visit: KaylaWell

## 2023-09-12 ENCOUNTER — MYC MEDICAL ADVICE (OUTPATIENT)
Dept: FAMILY MEDICINE | Facility: CLINIC | Age: 53
End: 2023-09-12
Payer: COMMERCIAL

## 2023-09-12 DIAGNOSIS — E11.9 TYPE 2 DIABETES MELLITUS WITHOUT COMPLICATION, WITHOUT LONG-TERM CURRENT USE OF INSULIN (H): Primary | ICD-10-CM

## 2023-09-16 ENCOUNTER — HEALTH MAINTENANCE LETTER (OUTPATIENT)
Age: 53
End: 2023-09-16

## 2023-09-21 ENCOUNTER — TELEPHONE (OUTPATIENT)
Dept: FAMILY MEDICINE | Facility: CLINIC | Age: 53
End: 2023-09-21
Payer: COMMERCIAL

## 2023-09-21 NOTE — TELEPHONE ENCOUNTER
Prior Authorization Request  Who s requesting:  Pharmacy  Pharmacy Name and Location: [place] [address]  Medication Name: Semaglutide-Weight Management (WEGOVY) 1.7 MG/0.75ML pen   Insurance Plan: [facility]  Insurance Member ID Number:  [id number]   CoverMyMeds Key: [id number]  Informed patient that prior authorizations can take up to 10 business days for response:   NA  Okay to leave a detailed message: No     Route to pool:  KYLAH HODGES MED

## 2023-09-26 NOTE — TELEPHONE ENCOUNTER
PA Initiation    Medication: WEGOVY 1.7 MG/0.75ML SC SOAJ  Insurance Company: [employer] - Phone [phone] Fax [phone]  Pharmacy Filling the Rx: [facility] #[id number] [place], [place] - [address] [place] [address]  AT [facility]  Filling Pharmacy Phone: [phone]  Filling Pharmacy Fax:    Start Date: 9/26/2023

## 2023-09-26 NOTE — TELEPHONE ENCOUNTER
Prior Authorization Approval    Medication: WEGOVY 1.7 MG/0.75ML SC SOAJ  Authorization Effective Date: 9/26/2023  Authorization Expiration Date: 12/15/2023  Approved Dose/Quantity: 3/28  Reference #: [id number]   Insurance Company: [employer] - Phone [phone] Fax [phone]  Expected CoPay: $    CoPay Card Available:      Financial Assistance Needed:   Which Pharmacy is filling the prescription: [facility] #[id number] [address]  AT [facility] OF [facility]  Pharmacy Notified:   Patient Notified:

## 2023-10-23 ENCOUNTER — MYC REFILL (OUTPATIENT)
Dept: FAMILY MEDICINE | Facility: CLINIC | Age: 53
End: 2023-10-23
Payer: COMMERCIAL

## 2023-10-23 DIAGNOSIS — E11.9 TYPE 2 DIABETES MELLITUS WITHOUT COMPLICATION, WITHOUT LONG-TERM CURRENT USE OF INSULIN (H): ICD-10-CM

## 2023-11-21 ENCOUNTER — MYC REFILL (OUTPATIENT)
Dept: FAMILY MEDICINE | Facility: CLINIC | Age: 53
End: 2023-11-21
Payer: COMMERCIAL

## 2023-11-21 DIAGNOSIS — E11.9 TYPE 2 DIABETES MELLITUS WITHOUT COMPLICATION, WITHOUT LONG-TERM CURRENT USE OF INSULIN (H): ICD-10-CM

## 2023-11-21 NOTE — TELEPHONE ENCOUNTER
MyChart reply sent.  Us scheduling ticket and help patient schedule OV in return MWM slot when she returns call.

## 2023-12-11 ENCOUNTER — OFFICE VISIT (OUTPATIENT)
Dept: FAMILY MEDICINE | Facility: CLINIC | Age: 53
End: 2023-12-11
Payer: COMMERCIAL

## 2023-12-11 ENCOUNTER — LAB (OUTPATIENT)
Dept: FAMILY MEDICINE | Facility: CLINIC | Age: 53
End: 2023-12-11

## 2023-12-11 VITALS
RESPIRATION RATE: 16 BRPM | SYSTOLIC BLOOD PRESSURE: 123 MMHG | HEART RATE: 100 BPM | TEMPERATURE: 98.8 F | BODY MASS INDEX: 41.07 KG/M2 | HEIGHT: 61 IN | WEIGHT: 217.5 LBS | DIASTOLIC BLOOD PRESSURE: 85 MMHG | OXYGEN SATURATION: 97 %

## 2023-12-11 DIAGNOSIS — E88.810 METABOLIC SYNDROME X: ICD-10-CM

## 2023-12-11 DIAGNOSIS — E66.01 CLASS 3 SEVERE OBESITY DUE TO EXCESS CALORIES WITH SERIOUS COMORBIDITY AND BODY MASS INDEX (BMI) OF 40.0 TO 44.9 IN ADULT (H): ICD-10-CM

## 2023-12-11 DIAGNOSIS — E11.9 TYPE 2 DIABETES MELLITUS WITHOUT COMPLICATION, WITHOUT LONG-TERM CURRENT USE OF INSULIN (H): Primary | ICD-10-CM

## 2023-12-11 DIAGNOSIS — Z12.4 CERVICAL CANCER SCREENING: ICD-10-CM

## 2023-12-11 DIAGNOSIS — E66.813 CLASS 3 SEVERE OBESITY DUE TO EXCESS CALORIES WITH SERIOUS COMORBIDITY AND BODY MASS INDEX (BMI) OF 40.0 TO 44.9 IN ADULT (H): ICD-10-CM

## 2023-12-11 DIAGNOSIS — I10 BENIGN ESSENTIAL HYPERTENSION: ICD-10-CM

## 2023-12-11 DIAGNOSIS — Z12.11 SCREEN FOR COLON CANCER: ICD-10-CM

## 2023-12-11 LAB
HBA1C MFR BLD: 6.3 % (ref 0–5.6)
HOLD SPECIMEN: NORMAL
HOLD SPECIMEN: NORMAL

## 2023-12-11 PROCEDURE — 82947 ASSAY GLUCOSE BLOOD QUANT: CPT | Performed by: FAMILY MEDICINE

## 2023-12-11 PROCEDURE — 83036 HEMOGLOBIN GLYCOSYLATED A1C: CPT | Performed by: FAMILY MEDICINE

## 2023-12-11 PROCEDURE — 36415 COLL VENOUS BLD VENIPUNCTURE: CPT | Performed by: FAMILY MEDICINE

## 2023-12-11 PROCEDURE — 99215 OFFICE O/P EST HI 40 MIN: CPT | Performed by: FAMILY MEDICINE

## 2023-12-11 PROCEDURE — 80061 LIPID PANEL: CPT | Performed by: FAMILY MEDICINE

## 2023-12-11 NOTE — ASSESSMENT & PLAN NOTE
The patient's overall diabetic control is stable in comparison to recent measurements.  However, she is gaining weight and eating in an erratic/unstructured way.  We had a lengthy conversation regarding nutrition.  I am concerned that, while she has short-term stability of blood sugar, her dietary patterns will ultimately lead to much worse control of her diabetes.  She is struggling with an excess consumption of sugar sweetened beverages and snacking is despite being on a GLP-1 receptor agonist.  She has had some side effects while on semaglutide.  She is gained weight while on Wegovy which is surprising and can only be compatible with the access of liquid calories.  We discussed elimination of pop if at all possible.  I encouraged her to deemphasize calories and focus on eating foods that promote satiety and a more structured way including breakfast.  Given her weight gain, she does meet eligibility criteria for a GLP-1 receptor agonist for weight loss.  Semaglutide has not been effective.  I think she may benefit from tirzepatide (either a Zepbound or as Mounjaro).  Zepbound was prescribed.  In the meantime, as long as it is covered she will continue Wegovy.  If she needs to go off of the medication for any period of time we will adjust the tirzepatide dose to avoid side effects and follow the more normal titration schedule.  I think she would be able to tolerate 7.5 mg tirzepatide per week.  This medication was sent to the pharmacy.  We reviewed that there will be a new process for approval as part of [facility]'s criteria changes.

## 2023-12-11 NOTE — PROGRESS NOTES
Assessment & Plan   Problem List Items Addressed This Visit       Benign essential hypertension     Stable/improved.         Relevant Orders    Lipid panel reflex to direct LDL Fasting    Class 3 severe obesity due to excess calories with serious comorbidity and body mass index (BMI) of 40.0 to 44.9 in adult (H)     Weight gain despite use of Wegovy.  Nutrition is discussed under header of diabetes.  Medication changes as discussed under diabetes.  She does meet criteria for a GLP-1 receptor agonist (both the standard FDA criteria and the insurance criteria through [employer]).  Reviewed nutrition.  Consider sending her back to dietitian.  Trial of tirzepatide recommended.         Relevant Medications    tirzepatide-Weight Management (ZEPBOUND) 7.5 MG/0.5ML prefilled pen    Metabolic syndrome X    Relevant Orders    Lipid panel reflex to direct LDL Fasting    Glucose    Type 2 diabetes mellitus without complication, without long-term current use of insulin (H) - Primary     The patient's overall diabetic control is stable in comparison to recent measurements.  However, she is gaining weight and eating in an erratic/unstructured way.  We had a lengthy conversation regarding nutrition.  I am concerned that, while she has short-term stability of blood sugar, her dietary patterns will ultimately lead to much worse control of her diabetes.  She is struggling with an excess consumption of sugar sweetened beverages and snacking is despite being on a GLP-1 receptor agonist.  She has had some side effects while on semaglutide.  She is gained weight while on Wegovy which is surprising and can only be compatible with the access of liquid calories.  We discussed elimination of pop if at all possible.  I encouraged her to deemphasize calories and focus on eating foods that promote satiety and a more structured way including breakfast.  Given her weight gain, she does meet eligibility criteria for a GLP-1 receptor agonist for  "weight loss.  Semaglutide has not been effective.  I think she may benefit from tirzepatide (either a Zepbound or as Mounjaro).  Zepbound was prescribed.  In the meantime, as long as it is covered she will continue Wegovy.  If she needs to go off of the medication for any period of time we will adjust the tirzepatide dose to avoid side effects and follow the more normal titration schedule.  I think she would be able to tolerate 7.5 mg tirzepatide per week.  This medication was sent to the pharmacy.  We reviewed that there will be a new process for approval as part of [facility]'s criteria changes.         Relevant Medications    tirzepatide-Weight Management (ZEPBOUND) 7.5 MG/0.5ML prefilled pen    Other Relevant Orders    Albumin Random Urine Quantitative with Creat Ratio    HEMOGLOBIN A1C (Completed)     Other Visit Diagnoses       Screen for colon cancer        Cervical cancer screening        Relevant Orders    COLOGUARD([employer])           41 minutes spent by me on the date of the encounter doing chart review, history and exam, documentation and further activities per the note     BMI:   Estimated body mass index is 41.78 kg/m  as calculated from the following:    Height as of this encounter: 1.537 m (5' 0.5\").    Weight as of this encounter: 98.7 kg (217 lb 8 oz).   Weight management plan: Specific weight management program called comprehensive weight management discussed    Abimael Walker MD  [facility] KATIE Vital is a 53 year old, presenting for the following health issues:  Weight Loss (Follow-up/)        12/11/2023    11:20 AM   Additional Questions   Roomed by jeanette     DM:   - continues to struggle with sweats and pop.  She does not think that she is overeating.     - portions have been okay.     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  Some cravings that come and go (such as holiday timeframe).   - mood: pretty good.   - lunch is leftovers.  \"Baked chicken (with mac and " "cheese or french fries) or taco meat (with low carb shell, cheese).\" No breakfast.  Fasting from 7pm-11am.  Pop is with lunch. [employer].  She has another in the afternoon.  She has to snack on chips.  No veggies generally.  Fruit: grapes   - she has been most concerned with calories and estimates that she gets 4487-5492 calories.       Review of Systems   All other systems reviewed and are negative.        Objective    /85 (BP Location: Left arm, Patient Position: Sitting, Cuff Size: Adult Regular)   Pulse 100   Temp 98.8  F (37.1  C) (Oral)   Resp 16   Ht 1.537 m (5' 0.5\")   Wt 98.7 kg (217 lb 8 oz)   SpO2 97%   BMI 41.78 kg/m    Body mass index is 41.78 kg/m .  Physical Exam  Nursing note reviewed.   Constitutional:       General: She is not in acute distress.     Appearance: Normal appearance. She is not ill-appearing.   HENT:      Head: Normocephalic and atraumatic.   Eyes:      Extraocular Movements: Extraocular movements intact.      Conjunctiva/sclera: Conjunctivae normal.   Pulmonary:      Effort: Pulmonary effort is normal.   Neurological:      Mental Status: She is alert and oriented to person, place, and time.   Psychiatric:         Attention and Perception: Attention normal.         Mood and Affect: Mood normal.         Speech: Speech normal.         Thought Content: Thought content normal.                              "

## 2023-12-11 NOTE — ASSESSMENT & PLAN NOTE
Weight gain despite use of Wegovy.  Nutrition is discussed under header of diabetes.  Medication changes as discussed under diabetes.  She does meet criteria for a GLP-1 receptor agonist (both the standard FDA criteria and the insurance criteria through [employer]).  Reviewed nutrition.  Consider sending her back to dietitian.  Trial of tirzepatide recommended.

## 2023-12-12 LAB
CHOLEST SERPL-MCNC: 237 MG/DL
GLUCOSE SERPL-MCNC: 97 MG/DL (ref 70–99)
HDLC SERPL-MCNC: 41 MG/DL
LDLC SERPL CALC-MCNC: 158 MG/DL
NONHDLC SERPL-MCNC: 196 MG/DL
TRIGL SERPL-MCNC: 189 MG/DL

## 2023-12-15 ENCOUNTER — OFFICE VISIT (OUTPATIENT)
Dept: INTERNAL MEDICINE | Facility: CLINIC | Age: 53
End: 2023-12-15
Payer: COMMERCIAL

## 2023-12-15 ENCOUNTER — NURSE TRIAGE (OUTPATIENT)
Dept: FAMILY MEDICINE | Facility: CLINIC | Age: 53
End: 2023-12-15

## 2023-12-15 ENCOUNTER — TELEPHONE (OUTPATIENT)
Dept: NURSING | Facility: CLINIC | Age: 53
End: 2023-12-15

## 2023-12-15 ENCOUNTER — ANCILLARY PROCEDURE (OUTPATIENT)
Dept: GENERAL RADIOLOGY | Facility: CLINIC | Age: 53
End: 2023-12-15
Attending: NURSE PRACTITIONER
Payer: COMMERCIAL

## 2023-12-15 VITALS
HEART RATE: 100 BPM | HEIGHT: 61 IN | WEIGHT: 217 LBS | DIASTOLIC BLOOD PRESSURE: 80 MMHG | OXYGEN SATURATION: 97 % | SYSTOLIC BLOOD PRESSURE: 120 MMHG | TEMPERATURE: 97.8 F | BODY MASS INDEX: 40.97 KG/M2 | RESPIRATION RATE: 16 BRPM

## 2023-12-15 DIAGNOSIS — R05.1 ACUTE COUGH: ICD-10-CM

## 2023-12-15 DIAGNOSIS — R53.83 FATIGUE, UNSPECIFIED TYPE: ICD-10-CM

## 2023-12-15 DIAGNOSIS — R05.1 ACUTE COUGH: Primary | ICD-10-CM

## 2023-12-15 DIAGNOSIS — R52 BODY ACHES: ICD-10-CM

## 2023-12-15 DIAGNOSIS — U07.1 COVID-19: Primary | ICD-10-CM

## 2023-12-15 LAB
ANION GAP SERPL CALCULATED.3IONS-SCNC: 11 MMOL/L (ref 7–15)
BASOPHILS # BLD AUTO: 0 10E3/UL (ref 0–0.2)
BASOPHILS NFR BLD AUTO: 0 %
BUN SERPL-MCNC: 7.4 MG/DL (ref 6–20)
CALCIUM SERPL-MCNC: 9.1 MG/DL (ref 8.6–10)
CHLORIDE SERPL-SCNC: 102 MMOL/L (ref 98–107)
CREAT SERPL-MCNC: 0.76 MG/DL (ref 0.51–0.95)
DEPRECATED HCO3 PLAS-SCNC: 22 MMOL/L (ref 22–29)
EGFRCR SERPLBLD CKD-EPI 2021: >90 ML/MIN/1.73M2
EOSINOPHIL # BLD AUTO: 0.1 10E3/UL (ref 0–0.7)
EOSINOPHIL NFR BLD AUTO: 2 %
ERYTHROCYTE [DISTWIDTH] IN BLOOD BY AUTOMATED COUNT: 13.5 % (ref 10–15)
FLUAV RNA SPEC QL NAA+PROBE: NEGATIVE
FLUBV RNA RESP QL NAA+PROBE: NEGATIVE
GLUCOSE SERPL-MCNC: 96 MG/DL (ref 70–99)
HCT VFR BLD AUTO: 39.8 % (ref 35–47)
HGB BLD-MCNC: 14.3 G/DL (ref 11.7–15.7)
IMM GRANULOCYTES # BLD: 0.1 10E3/UL
IMM GRANULOCYTES NFR BLD: 1 %
LYMPHOCYTES # BLD AUTO: 0.6 10E3/UL (ref 0.8–5.3)
LYMPHOCYTES NFR BLD AUTO: 12 %
MCH RBC QN AUTO: 28.1 PG (ref 26.5–33)
MCHC RBC AUTO-ENTMCNC: 35.9 G/DL (ref 31.5–36.5)
MCV RBC AUTO: 78 FL (ref 78–100)
MONOCYTES # BLD AUTO: 0.8 10E3/UL (ref 0–1.3)
MONOCYTES NFR BLD AUTO: 16 %
NEUTROPHILS # BLD AUTO: 3.5 10E3/UL (ref 1.6–8.3)
NEUTROPHILS NFR BLD AUTO: 69 %
PLATELET # BLD AUTO: 229 10E3/UL (ref 150–450)
POTASSIUM SERPL-SCNC: 4 MMOL/L (ref 3.4–5.3)
PROCALCITONIN SERPL IA-MCNC: 0.06 NG/ML
RBC # BLD AUTO: 5.09 10E6/UL (ref 3.8–5.2)
RSV RNA SPEC NAA+PROBE: NEGATIVE
SARS-COV-2 RNA RESP QL NAA+PROBE: POSITIVE
SODIUM SERPL-SCNC: 135 MMOL/L (ref 135–145)
WBC # BLD AUTO: 5.1 10E3/UL (ref 4–11)

## 2023-12-15 PROCEDURE — 80048 BASIC METABOLIC PNL TOTAL CA: CPT | Performed by: NURSE PRACTITIONER

## 2023-12-15 PROCEDURE — 85025 COMPLETE CBC W/AUTO DIFF WBC: CPT | Performed by: NURSE PRACTITIONER

## 2023-12-15 PROCEDURE — 87637 SARSCOV2&INF A&B&RSV AMP PRB: CPT | Performed by: NURSE PRACTITIONER

## 2023-12-15 PROCEDURE — 84145 PROCALCITONIN (PCT): CPT | Performed by: NURSE PRACTITIONER

## 2023-12-15 PROCEDURE — 99214 OFFICE O/P EST MOD 30 MIN: CPT | Performed by: NURSE PRACTITIONER

## 2023-12-15 PROCEDURE — 71046 X-RAY EXAM CHEST 2 VIEWS: CPT | Mod: TC | Performed by: RADIOLOGY

## 2023-12-15 PROCEDURE — 36415 COLL VENOUS BLD VENIPUNCTURE: CPT | Performed by: NURSE PRACTITIONER

## 2023-12-15 RX ORDER — ALBUTEROL SULFATE 90 UG/1
2 AEROSOL, METERED RESPIRATORY (INHALATION) EVERY 6 HOURS PRN
Qty: 18 G | Refills: 1 | Status: SHIPPED | OUTPATIENT
Start: 2023-12-15 | End: 2024-02-05

## 2023-12-15 NOTE — TELEPHONE ENCOUNTER
Fco with Jodi No CNP.   Medication was prescribed to pt.     Verbal order to hold Rosuvastatin. Pt repeat back correctly.     Red flags review with pt.   No further questions.     Reason for Disposition   HIGH RISK patient (e.g., weak immune system, age > 64 years, obesity with BMI of 30 or higher, pregnant, chronic lung disease or other chronic medical condition) and COVID symptoms (e.g., cough, fever)  (Exceptions: Already seen by doctor or NP/PA and no new or worsening symptoms.)    Additional Information   Negative: SEVERE difficulty breathing (e.g., struggling for each breath, speaks in single words)   Negative: Difficult to awaken or acting confused (e.g., disoriented, slurred speech)   Negative: Bluish (or gray) lips or face now   Negative: Shock suspected (e.g., cold/pale/clammy skin, too weak to stand, low BP, rapid pulse)   Negative: Sounds like a life-threatening emergency to the triager   Negative: Diagnosed or suspected COVID-19 and symptoms lasting 3 or more weeks   Negative: COVID-19 exposure and no symptoms   Negative: COVID-19 vaccine reaction suspected (e.g., fever, headache, muscle aches) occurring 1 to 3 days after getting vaccine   Negative: COVID-19 vaccine, questions about   Negative: Lives with someone known to have influenza (flu test positive) and flu-like symptoms (e.g., cough, runny nose, sore throat, SOB; with or without fever)   Negative: Possible COVID-19 symptoms and triager concerned about severity of symptoms or other causes   Negative: COVID-19 and breastfeeding, questions about   Negative: SEVERE or constant chest pain or pressure  (Exception: Mild central chest pain, present only when coughing.)   Negative: MODERATE difficulty breathing (e.g., speaks in phrases, SOB even at rest, pulse 100-120)   Negative: Headache and stiff neck (can't touch chin to chest)   Negative: Oxygen level (e.g., pulse oximetry) 90% or lower   Negative: Chest pain or pressure  (Exception:  MILD central chest pain, present only when coughing.)   Negative: Drinking very little and dehydration suspected (e.g., no urine > 12 hours, very dry mouth, very lightheaded)   Negative: Patient sounds very sick or weak to the triager   Negative: MILD difficulty breathing (e.g., minimal/no SOB at rest, SOB with walking, pulse <100)   Negative: Fever > 103 F (39.4 C)   Negative: Fever > 101 F (38.3 C) and over 60 years of age   Negative: Fever > 100.0 F (37.8 C) and bedridden (e.g., CVA, chronic illness, recovering from surgery)    Protocols used: Coronavirus (COVID-19) Diagnosed or [id number]

## 2023-12-15 NOTE — TELEPHONE ENCOUNTER
RN COVID TREATMENT VISIT  12/15/23      The patient has been triaged and does not require a higher level of care.    Mana Bull  53 year old  Current weight? 217    Has the patient been seen by a primary care provider at an [facility] or [facility] within the past two years? Yes.   Have you been in close proximity to/do you have a known exposure to a person with a confirmed case of influenza? No.     General treatment eligibility:  Date of positive COVID test (PCR or at home)?  12/15    Are you or have you been hospitalized for this COVID-19 infection? No.   Have you received monoclonal antibodies or antiviral treatment for COVID-19 since this positive test? No.   Do you have any of the following conditions that place you at risk of being very sick from COVID-19?   - Overweight or Obesity (BMI >85th percentile or BMI 25 or higher)  Yes, patient has at least one high risk condition as noted above.     Current COVID symptoms:   - fever or chills  - cough  - fatigue  - muscle or body aches  - headache  - sore throat  - congestion or runny nose  - nausea or vomiting  Yes. Patient has at least one symptom as selected.     How many days since symptoms started? 5 days or less. Established patient, 12 years or older weighing at least 88.2 lbs, who has symptoms that started in the past 5 days, has not been hospitalized nor received treatment already, and is at risk for being very sick from COVID-19.     Treatment eligibility by RN:  Are you currently pregnant or nursing? No  Do you have a clinically significant hypersensitivity to nirmatrelvir or ritonavir, or toxic epidermal necrolysis (TEN) or Lujan-Jett Syndrome? No  Do you have a history of hepatitis, any hepatic impairment on the Problem List (such as Child-Chappell Class C, cirrhosis, fatty liver disease, alcoholic liver disease), or was the last liver lab (hepatic panel, ALT, AST, ALK Phos, bilirubin) elevated in the past 6  months? No  Do you have any history of severe renal impairment (eGFR < 30mL/min)? No    Is patient eligible to continue? Yes, patient meets all eligibility requirements for the RN COVID treatment (as denoted by all no responses above).     Current Outpatient Medications   Medication Sig Dispense Refill    nirmatrelvir and ritonavir (PAXLOVID) 300 mg/100 mg therapy pack Take 3 tablets by mouth 2 times daily for 5 days 30 tablet 0    albuterol (PROAIR HFA/PROVENTIL HFA/VENTOLIN HFA) 108 (90 Base) MCG/ACT inhaler Inhale 2 puffs into the lungs every 6 hours as needed for shortness of breath, wheezing or cough 18 g 1    aspirin 81 MG EC tablet Take 1 tablet (81 mg) by mouth daily 3 tablet     insulin pen needle (32G X 4 MM) 32G X 4 MM miscellaneous Use 1 pen needles daily or as directed. 50 each 1    ondansetron (ZOFRAN) 4 MG tablet Take 1 tablet (4 mg) by mouth every 8 hours as needed for nausea (Patient not taking: Reported on 12/15/2023) 45 tablet 0    rosuvastatin (CRESTOR) 10 MG tablet Take 1 tablet (10 mg) by mouth daily 90 tablet 1    Semaglutide-Weight Management (WEGOVY) 1.7 MG/0.75ML pen Inject 1.7 mg Subcutaneous once a week 3 mL 1       Medications from List 1 of the standing order (on medications that exclude the use of Paxlovid) that patient is taking: NONE. Is patient taking Ayden's Wort? No  Is patient taking Ayden's Wort or any meds from List 1? No.   Medications from List 2 of the standing order (on meds that provider needs to adjust) that patient is taking: NONE. Is patient on any of the meds from List 2? No.   Medications from List 3 of standing order (on meds that a RN needs to adjust) that patient is taking: rosuvastatin (Crestor): Instructed patient to stop rosuvastatin while taking Paxlovid and restart rosuvastatin 1 day after the completion of Paxlovid.  Is patient on any meds from List 3? Yes. Patient is on meds from list 3. No meds require a provider visit and at least one med required RN  to adjust.     Paxlovid has an approximate 90% reduction in hospitalization. Paxlovid can possibly cause altered sense of taste, diarrhea (loose, watery stools), high blood pressure, muscle aches.     Would patient like a Paxlovid prescription?   Yes.   Lab Results   Component Value Date    GFRESTIMATED >90 12/15/2023       Was last eGFR reduced? No, eGFR 60 or greater/ No Result on record. Patient can receive the normal renal function dose. Paxlovid Rx sent to [place] pharmacy   [address]     Temporary change to home medications:  Instructed patient to stop rosuvastatin while taking Paxlovid and restart rosuvastatin 1 day after the completion of Paxlovid.      All medication adjustments (holds, etc) were discussed with the patient and patient was asked to repeat back (teachback) their med adjustment.  Did patient understand med adjustment? Yes, patient repeated back and understood correctly.        Reviewed the following instructions with the patient:    Paxlovid (nimatrelvir and ritonavir)    How it works  Two medicines (nirmatrelvir and ritonavir) are taken together. They stop the virus from growing. Less amount of virus is easier for your body to fight.    How to take  Medicine comes in a daily container with both medicine tablets. Take by mouth twice daily (once in the morning, once at night) for 5 days.  The number of tablets to take varies by patient.  Don't chew or break capsules. Swallow whole.    When to take  Take as soon as possible after positive COVID-19 test result, and within 5 days of your first symptoms.    Possible side effects  Can cause altered sense of taste, diarrhea (loose, watery stools), high blood pressure, muscle aches.    Shaun Van RN

## 2023-12-15 NOTE — TELEPHONE ENCOUNTER
Patient classified as COVID treatment eligible by Epic high risk algorithm:  No    Coronavirus (COVID-19) Notification    Reason for call  Notify of POSITIVE COVID-19 lab result, assess symptoms,  review [facility] recommendations    Lab Result   Lab test for 2019-nCoV rRt-PCR or SARS-COV-2 PCR  Oropharyngeal AND/OR nasopharyngeal swabs were POSITIVE for 2019-nCoV RNA [OR] SARS-COV-2 RNA (COVID-19) RNA     We have been unable to reach patient by phone at this time to notify of their Positive COVID-19 result.    Left voicemail message requesting a call back to [phone] [facility] for results.        A Positive COVID-19 letter will be sent via [employer] or the mail.    Sofya Ferro

## 2023-12-15 NOTE — PATIENT INSTRUCTIONS
Rest, push fluids.    Alternate Tylenol and Advil as needed for pain or fever.    Albuterol as needed to help open your lungs up.

## 2023-12-15 NOTE — PROGRESS NOTES
"  Assessment & Plan     Acute cough/Body aches/Fatigue, unspecified type: Started last evening at home, headache, body aches, fatigue, diarrhea, and cough. Wheezed throughout all fields to auscultation. X-ray read by provider shows scarring in her lungs, but no infiltrate. White count was normal, procal, BMP, and viral swabs processing. Sent the patient home as she was at work, rest push fluids. Follow up if symptoms persist or worsen.   - Symptomatic Influenza A/B, RSV, & SARS-CoV2 PCR (COVID-19)  - XR Chest 2 Views  - CBC with platelets and differential  - Procalcitonin  - Basic metabolic panel  (Ca, Cl, CO2, Creat, Gluc, K, Na, BUN)  - albuterol (PROAIR HFA/PROVENTIL HFA/VENTOLIN HFA) 108 (90 Base) MCG/ACT inhaler  Dispense: 18 g; Refill: 1    KYLEE Gardner [facility]    Chidi Vital is a 53 year old, presenting for the following health issues:  Generalized Body Aches (Cough, headache, nausea, foggy since this am)        12/15/2023     8:23 AM   Additional Questions   Roomed by Brandi MORGAN The patient presents today with concerns of a headache, cough, and body aches that started last evening.    She was out [place] shopping with her Mom, had a few episodes of diarrhea, so they headed home.    She denies any shortness of breath, chest pain, or chest pressure. Cough has not been productive.    She reports generalized fatigue. She is clammy today in office.     Review of Systems   Constitutional, HEENT, cardiovascular, pulmonary, GI, , musculoskeletal, neuro, skin, endocrine and psych systems are negative, except as otherwise noted.      Objective    /80 (BP Location: Right arm, Patient Position: Sitting)   Pulse 100   Temp 97.8  F (36.6  C)   Resp 16   Ht 1.537 m (5' 0.51\")   Wt 98.4 kg (217 lb)   LMP  (LMP Unknown)   SpO2 97%   BMI 41.67 kg/m    Body mass index is 41.67 kg/m .  Physical Exam  Vitals and nursing note reviewed. "   Constitutional:       Appearance: She is obese. She is ill-appearing and diaphoretic.   HENT:      Head: Normocephalic and atraumatic.      Right Ear: Tympanic membrane, ear canal and external ear normal.      Left Ear: Tympanic membrane, ear canal and external ear normal.      Nose: Congestion and rhinorrhea present.      Mouth/Throat:      Mouth: Mucous membranes are dry.      Pharynx: Posterior oropharyngeal erythema present.   Eyes:      Extraocular Movements: Extraocular movements intact.      Conjunctiva/sclera: Conjunctivae normal.      Pupils: Pupils are equal, round, and reactive to light.   Cardiovascular:      Rate and Rhythm: Normal rate and regular rhythm.      Pulses: Normal pulses.      Heart sounds: Normal heart sounds. No murmur heard.     No gallop.   Pulmonary:      Effort: Pulmonary effort is normal.      Breath sounds: Wheezing present.      Comments: Wheezes all fields   Musculoskeletal:      Cervical back: Normal range of motion and neck supple. No rigidity.   Skin:     General: Skin is warm.      Capillary Refill: Capillary refill takes less than 2 seconds.   Neurological:      General: No focal deficit present.      Mental Status: She is alert and oriented to person, place, and time. Mental status is at baseline.   Psychiatric:         Mood and Affect: Mood normal.         Behavior: Behavior normal.         Thought Content: Thought content normal.         Judgment: Judgment normal.

## 2023-12-29 ENCOUNTER — MYC MEDICAL ADVICE (OUTPATIENT)
Dept: FAMILY MEDICINE | Facility: CLINIC | Age: 53
End: 2023-12-29
Payer: COMMERCIAL

## 2023-12-29 DIAGNOSIS — E11.9 TYPE 2 DIABETES MELLITUS WITHOUT COMPLICATION, WITHOUT LONG-TERM CURRENT USE OF INSULIN (H): ICD-10-CM

## 2024-01-04 ENCOUNTER — MYC MEDICAL ADVICE (OUTPATIENT)
Dept: FAMILY MEDICINE | Facility: CLINIC | Age: 54
End: 2024-01-04
Payer: COMMERCIAL

## 2024-01-04 DIAGNOSIS — E66.813 CLASS 3 SEVERE OBESITY DUE TO EXCESS CALORIES WITH SERIOUS COMORBIDITY AND BODY MASS INDEX (BMI) OF 40.0 TO 44.9 IN ADULT (H): Primary | ICD-10-CM

## 2024-01-04 DIAGNOSIS — E66.01 CLASS 3 SEVERE OBESITY DUE TO EXCESS CALORIES WITH SERIOUS COMORBIDITY AND BODY MASS INDEX (BMI) OF 40.0 TO 44.9 IN ADULT (H): Primary | ICD-10-CM

## 2024-01-04 DIAGNOSIS — E11.9 TYPE 2 DIABETES MELLITUS WITHOUT COMPLICATION, WITHOUT LONG-TERM CURRENT USE OF INSULIN (H): ICD-10-CM

## 2024-01-04 DIAGNOSIS — E88.810 METABOLIC SYNDROME X: ICD-10-CM

## 2024-01-11 RX ORDER — TOPIRAMATE 25 MG/1
50 TABLET, FILM COATED ORAL
Qty: 60 TABLET | Refills: 1 | Status: SHIPPED | OUTPATIENT
Start: 2024-01-11 | End: 2024-02-02

## 2024-01-11 RX ORDER — PHENTERMINE HYDROCHLORIDE 15 MG/1
15 CAPSULE ORAL EVERY MORNING
Qty: 30 CAPSULE | Refills: 0 | Status: SHIPPED | OUTPATIENT
Start: 2024-01-11 | End: 2024-01-17

## 2024-01-13 ENCOUNTER — MYC MEDICAL ADVICE (OUTPATIENT)
Dept: FAMILY MEDICINE | Facility: CLINIC | Age: 54
End: 2024-01-13
Payer: COMMERCIAL

## 2024-01-13 DIAGNOSIS — E11.9 TYPE 2 DIABETES MELLITUS WITHOUT COMPLICATION, WITHOUT LONG-TERM CURRENT USE OF INSULIN (H): ICD-10-CM

## 2024-01-13 DIAGNOSIS — E66.813 CLASS 3 SEVERE OBESITY DUE TO EXCESS CALORIES WITH SERIOUS COMORBIDITY AND BODY MASS INDEX (BMI) OF 40.0 TO 44.9 IN ADULT (H): Primary | ICD-10-CM

## 2024-01-13 DIAGNOSIS — E66.01 CLASS 3 SEVERE OBESITY DUE TO EXCESS CALORIES WITH SERIOUS COMORBIDITY AND BODY MASS INDEX (BMI) OF 40.0 TO 44.9 IN ADULT (H): Primary | ICD-10-CM

## 2024-01-16 LAB — NONINV COLON CA DNA+OCC BLD SCRN STL QL: NEGATIVE

## 2024-01-17 ENCOUNTER — MYC MEDICAL ADVICE (OUTPATIENT)
Dept: FAMILY MEDICINE | Facility: CLINIC | Age: 54
End: 2024-01-17
Payer: COMMERCIAL

## 2024-01-17 DIAGNOSIS — E11.9 TYPE 2 DIABETES MELLITUS WITHOUT COMPLICATION, WITHOUT LONG-TERM CURRENT USE OF INSULIN (H): ICD-10-CM

## 2024-01-17 DIAGNOSIS — E66.813 CLASS 3 SEVERE OBESITY DUE TO EXCESS CALORIES WITH SERIOUS COMORBIDITY AND BODY MASS INDEX (BMI) OF 40.0 TO 44.9 IN ADULT (H): ICD-10-CM

## 2024-01-17 DIAGNOSIS — E88.810 METABOLIC SYNDROME X: ICD-10-CM

## 2024-01-17 DIAGNOSIS — E66.01 CLASS 3 SEVERE OBESITY DUE TO EXCESS CALORIES WITH SERIOUS COMORBIDITY AND BODY MASS INDEX (BMI) OF 40.0 TO 44.9 IN ADULT (H): ICD-10-CM

## 2024-01-17 RX ORDER — PHENTERMINE HYDROCHLORIDE 15 MG/1
15 CAPSULE ORAL EVERY MORNING
Qty: 30 CAPSULE | Refills: 0 | Status: SHIPPED | OUTPATIENT
Start: 2024-01-17 | End: 2024-02-09

## 2024-01-24 ENCOUNTER — TELEPHONE (OUTPATIENT)
Dept: INTERNAL MEDICINE | Facility: CLINIC | Age: 54
End: 2024-01-24
Payer: COMMERCIAL

## 2024-01-24 ENCOUNTER — ALLIED HEALTH/NURSE VISIT (OUTPATIENT)
Dept: FAMILY MEDICINE | Facility: CLINIC | Age: 54
End: 2024-01-24
Payer: COMMERCIAL

## 2024-01-24 DIAGNOSIS — J02.9 SORE THROAT: Primary | ICD-10-CM

## 2024-01-24 DIAGNOSIS — J02.9 SORE THROAT: ICD-10-CM

## 2024-01-24 PROCEDURE — 87637 SARSCOV2&INF A&B&RSV AMP PRB: CPT

## 2024-01-24 PROCEDURE — 99207 PR NO CHARGE NURSE ONLY: CPT

## 2024-01-25 ENCOUNTER — TELEPHONE (OUTPATIENT)
Dept: FAMILY MEDICINE | Facility: CLINIC | Age: 54
End: 2024-01-25
Payer: COMMERCIAL

## 2024-01-25 NOTE — TELEPHONE ENCOUNTER
MTM referral from: [facility] visit (referral by provider)    MTM referral outreach attempt #2 on January 25, 2024 at 10:02 AM      Outcome: Patient not reachable after several attempts, will route to MTM Pharmacist/Provider as an FYI.  MT scheduling number is .  Thank you for the referral.    Use [employer] (weight management employee program) for the carrier/Plan on the flowsheet      Bitbar Message Sent    Chikis Yoder CPhT  MTM      Patient called back and scheduled visit.

## 2024-02-02 ENCOUNTER — MYC REFILL (OUTPATIENT)
Dept: FAMILY MEDICINE | Facility: CLINIC | Age: 54
End: 2024-02-02
Payer: COMMERCIAL

## 2024-02-02 DIAGNOSIS — E66.813 CLASS 3 SEVERE OBESITY DUE TO EXCESS CALORIES WITH SERIOUS COMORBIDITY AND BODY MASS INDEX (BMI) OF 40.0 TO 44.9 IN ADULT (H): ICD-10-CM

## 2024-02-02 DIAGNOSIS — E88.810 METABOLIC SYNDROME X: ICD-10-CM

## 2024-02-02 DIAGNOSIS — E11.9 TYPE 2 DIABETES MELLITUS WITHOUT COMPLICATION, WITHOUT LONG-TERM CURRENT USE OF INSULIN (H): ICD-10-CM

## 2024-02-02 DIAGNOSIS — E66.01 CLASS 3 SEVERE OBESITY DUE TO EXCESS CALORIES WITH SERIOUS COMORBIDITY AND BODY MASS INDEX (BMI) OF 40.0 TO 44.9 IN ADULT (H): ICD-10-CM

## 2024-02-02 RX ORDER — TOPIRAMATE 25 MG/1
50 TABLET, FILM COATED ORAL
Qty: 60 TABLET | Refills: 1 | Status: SHIPPED | OUTPATIENT
Start: 2024-02-02 | End: 2024-04-11

## 2024-02-05 ENCOUNTER — TELEPHONE (OUTPATIENT)
Dept: SURGERY | Facility: CLINIC | Age: 54
End: 2024-02-05
Payer: COMMERCIAL

## 2024-02-05 ENCOUNTER — VIRTUAL VISIT (OUTPATIENT)
Dept: CARDIOLOGY | Facility: CLINIC | Age: 54
End: 2024-02-05
Attending: FAMILY MEDICINE
Payer: COMMERCIAL

## 2024-02-05 VITALS — WEIGHT: 207.6 LBS | HEIGHT: 60 IN | BODY MASS INDEX: 40.76 KG/M2

## 2024-02-05 DIAGNOSIS — E66.813 CLASS 3 SEVERE OBESITY DUE TO EXCESS CALORIES WITH SERIOUS COMORBIDITY AND BODY MASS INDEX (BMI) OF 40.0 TO 44.9 IN ADULT (H): Primary | ICD-10-CM

## 2024-02-05 DIAGNOSIS — E66.01 CLASS 3 SEVERE OBESITY DUE TO EXCESS CALORIES WITH SERIOUS COMORBIDITY AND BODY MASS INDEX (BMI) OF 40.0 TO 44.9 IN ADULT (H): Primary | ICD-10-CM

## 2024-02-05 ASSESSMENT — PAIN SCALES - GENERAL: PAINLEVEL: NO PAIN (0)

## 2024-02-05 NOTE — Clinical Note
Chris Ba,  I spoke with Zahraa as a part of the [facility] insurance requirements and coverage of GLP-1/GIP agonist therapies. She has been doing well with Phentermine and topiramate over the last few weeks and has a follow up with Monday 2/12. She was wary of transition to Zepbound given her historical relative non-response to Wegovy. Would you like to speak with her next Monday to decide on use of Zepbound now versus after a continued trial of her current therapies? Please let me know, I can enter orders if you would like.   Thank you, Mark

## 2024-02-05 NOTE — LETTER
2/5/2024      RE: Mana Bull  [address] [address]       Dear Colleague,    Thank you for the opportunity to participate in the care of your patient, Mana Bull, at the [facility] [place] [facility]. Please see a copy of my visit note below.    Medication Therapy Management (MTM) Encounter    ASSESSMENT:                            Medication Adherence/Access: No issues identified    Weight management: Positive weight progress with recent initiation of phentermine and topiramate.  Previous efforts to initiate Zepbound had been limited by insurance changes.  She is an appropriate candidate for GLP-1/GIP agonist therapy, pretreatment BMI greater than 40 kg/m .  She did have a relative nonresponse to use of Wegovy and historical intolerance to Saxenda.  Will confer with Dr. Ba on his preferred approach to initiation of Zepbound.  Negative baseline GI symptomatology. Negative history of pancreatitis, medullary thyroid cancer and multiple endocrine neoplasia type 2.      For patients that are under [employer] insurance coverage, it is mandated by insurance that each qualifying patient meet with hospital based Weight Management Medication Therapy Management pharmacist to continue therapy coverage. The following patient meets the below coverage criteria and can therefore continue GLP-1/GIP agonist therapy for Weight Management:    Adult  BMI >40 with or without comorbidities   OR   BMI >30 + NAFLD*   at time of initiating GLP-1/GIP agonist therapy Approved for 29 weeks  Met Updated Initial Criteria   At least 5% weight loss of baseline body weight  Approved for 12 months      PLAN:                            I will contact Dr. Ba regarding plans of initiation for Zepbound in the setting of your current use of phentermine and topiramate.  If Zepbound is initiated, begin as follows:    Start  Zepbound 2.5 mg once weekly for 4 weeks then increase to 5 mg once weekly for 4 weeks then increase to 7.5 mg once weekly thereafter.    Follow-up with me 6 to 8 weeks after initiation.    SUBJECTIVE/OBJECTIVE:                          Zahraa Bull is a 53 year old female coming in for an initial visit. She was referred to me from [facility] insurance requirements .      Reason for visit: GLP-1 agonist consult.    Allergies/ADRs: Reviewed in chart  Past Medical History: Reviewed in chart  Tobacco: She reports that she quit smoking about 3 years ago. Her smoking use included cigarettes. She has been exposed to tobacco smoke. She has never used smokeless tobacco.      Medication Adherence/Access: no issues reported    Weight management:  Phentermine 15 mg qam   Topiramate 25 mg 2 tabs qpm     Not currently on Wegovy, last dose she was on was 1.7 mg weekly in October 2023. Stopped due to lack of progress, had planned to use Zepbound at that time, but was unable to due to insurance barriers.  Continues to work with Dr. Ba. Denies any historical GI adverse effects with use of Wegovy. Did also historically use Saxenda, did have some troubles with nausea and lack of efficacy. Currently on phentermine and topiramate, has lost 10 lbs since starting in 2 weeks ago. Denies anxiety, palpitations, fogginess, fatigue with this combination. Has used this combination before. Does note some wariness about the potential with lack of success with the GLP-1/GIP agonists given her historical non-response.     Medication History:  Fluid/Water intake: Drinks 2-4 liters water daily   Diet: 1-2 meals/day, lunch and dinner, light lunch, salad, baked chicken, vegetables, rice, pasta, does try to keep low carb if possible   Physical activity: Tries to work out 2-3x/week, pilates, cardio drumming, fit boxing     Medical History:  MEN2/Medullary Thyroid Cancer: Negative   Pancreatitis: Negative     Wt Readings from Last 4 Encounters:   02/05/24  94.2 kg (207 lb 9.6 oz)   12/15/23 98.4 kg (217 lb)   12/11/23 98.7 kg (217 lb 8 oz)   05/22/23 93.6 kg (206 lb 7 oz)     Body Mass Index (BMI) Body mass index is 40.54 kg/m .    Today's Vitals: Ht 1.524 m (5')   Wt 94.2 kg (207 lb 9.6 oz)   LMP  (LMP Unknown)   BMI 40.54 kg/m      Lab Results   Component Value Date    A1C 6.3 12/11/2023    A1C 6.5 05/22/2023    A1C 5.5 05/03/2021    A1C 6.5 06/02/2020    A1C 6.8 06/03/2019    A1C 6.0 07/25/2017     Lab Results   Component Value Date    CHOL 237 12/11/2023     Lab Results   Component Value Date    HDL 41 12/11/2023     Lab Results   Component Value Date     12/11/2023     Lab Results   Component Value Date    TRIG 189 12/11/2023     ----------------      I spent 20 minutes with this patient today. All changes were made via collaborative practice agreement with Tresa Regalado PA-C . A copy of the visit note was provided to the patient's provider(s).    A summary of these recommendations was sent via [employer].    Conrado Rivera, PharmD, BCACP  Medication Therapy Management Pharmacist  [facility]     Telemedicine Visit Details  Type of service:  Telephone visit  Start Time:  1037am  End Time:  1057am     Medication Therapy Recommendations  No medication therapy recommendations to display         Please do not hesitate to contact me if you have any questions/concerns.     Sincerely,     CONRADO RIVERA [facility]

## 2024-02-05 NOTE — PROGRESS NOTES
Medication Therapy Management (MTM) Encounter    ASSESSMENT:                            Medication Adherence/Access: No issues identified    Weight management: Positive weight progress with recent initiation of phentermine and topiramate.  Previous efforts to initiate Zepbound had been limited by insurance changes.  She is an appropriate candidate for GLP-1/GIP agonist therapy, pretreatment BMI greater than 40 kg/m .  She did have a relative nonresponse to use of Wegovy and historical intolerance to Saxenda.  Will confer with Dr. Ba on his preferred approach to initiation of Zepbound.  Negative baseline GI symptomatology. Negative history of pancreatitis, medullary thyroid cancer and multiple endocrine neoplasia type 2.      For patients that are under [employer] insurance coverage, it is mandated by insurance that each qualifying patient meet with hospital based Weight Management Medication Therapy Management pharmacist to continue therapy coverage. The following patient meets the below coverage criteria and can therefore continue GLP-1/GIP agonist therapy for Weight Management:    Adult  BMI >40 with or without comorbidities   OR   BMI >30 + NAFLD*   at time of initiating GLP-1/GIP agonist therapy Approved for 29 weeks  Met Updated Initial Criteria   At least 5% weight loss of baseline body weight  Approved for 12 months      PLAN:                            I will contact Dr. Ba regarding plans of initiation for Zepbound in the setting of your current use of phentermine and topiramate.  If Zepbound is initiated, begin as follows:    Start Zepbound 2.5 mg once weekly for 4 weeks then increase to 5 mg once weekly for 4 weeks then increase to 7.5 mg once weekly thereafter.    Follow-up with me 6 to 8 weeks after initiation.    SUBJECTIVE/OBJECTIVE:                          Zahraa Bull is a 53 year old female coming in for an initial visit. She was referred to me from [facility] insurance  requirements .      Reason for visit: GLP-1 agonist consult.    Allergies/ADRs: Reviewed in chart  Past Medical History: Reviewed in chart  Tobacco: She reports that she quit smoking about 3 years ago. Her smoking use included cigarettes. She has been exposed to tobacco smoke. She has never used smokeless tobacco.      Medication Adherence/Access: no issues reported    Weight management:  Phentermine 15 mg qam   Topiramate 25 mg 2 tabs qpm     Not currently on Wegovy, last dose she was on was 1.7 mg weekly in October 2023. Stopped due to lack of progress, had planned to use Zepbound at that time, but was unable to due to insurance barriers.  Continues to work with Dr. Ba. Denies any historical GI adverse effects with use of Wegovy. Did also historically use Saxenda, did have some troubles with nausea and lack of efficacy. Currently on phentermine and topiramate, has lost 10 lbs since starting in 2 weeks ago. Denies anxiety, palpitations, fogginess, fatigue with this combination. Has used this combination before. Does note some wariness about the potential with lack of success with the GLP-1/GIP agonists given her historical non-response.     Medication History:  Fluid/Water intake: Drinks 2-4 liters water daily   Diet: 1-2 meals/day, lunch and dinner, light lunch, salad, baked chicken, vegetables, rice, pasta, does try to keep low carb if possible   Physical activity: Tries to work out 2-3x/week, pilates, cardio drumming, fit boxing     Medical History:  MEN2/Medullary Thyroid Cancer: Negative   Pancreatitis: Negative     Wt Readings from Last 4 Encounters:   02/05/24 94.2 kg (207 lb 9.6 oz)   12/15/23 98.4 kg (217 lb)   12/11/23 98.7 kg (217 lb 8 oz)   05/22/23 93.6 kg (206 lb 7 oz)     Body Mass Index (BMI) Body mass index is 40.54 kg/m .    Today's Vitals: Ht 1.524 m (5')   Wt 94.2 kg (207 lb 9.6 oz)   LMP  (LMP Unknown)   BMI 40.54 kg/m      Lab Results   Component Value Date    A1C 6.3 12/11/2023     A1C 6.5 05/22/2023    A1C 5.5 05/03/2021    A1C 6.5 06/02/2020    A1C 6.8 06/03/2019    A1C 6.0 07/25/2017     Lab Results   Component Value Date    CHOL 237 12/11/2023     Lab Results   Component Value Date    HDL 41 12/11/2023     Lab Results   Component Value Date     12/11/2023     Lab Results   Component Value Date    TRIG 189 12/11/2023     ----------------      I spent 20 minutes with this patient today. All changes were made via collaborative practice agreement with Tresa Regalado PA-C . A copy of the visit note was provided to the patient's provider(s).    A summary of these recommendations was sent via [employer]    Conrado Rivera, PharmD, BCACP  Medication Therapy Management Pharmacist  [facility]     Telemedicine Visit Details  Type of service:  Telephone visit  Start Time:  1037am  End Time:  1057am     Medication Therapy Recommendations  No medication therapy recommendations to display

## 2024-02-05 NOTE — PATIENT INSTRUCTIONS
"Recommendations from today's MTM visit:                                                    MTM (medication therapy management) is a service provided by a clinical pharmacist designed to help you get the most of out of your medicines.      I will contact Dr. Ba regarding plans of initiation for Zepbound in the setting of your current use of phentermine and topiramate.  If Zepbound is initiated, begin as follows:     Start Zepbound 2.5 mg once weekly for 4 weeks then increase to 5 mg once weekly for 4 weeks then increase to 7.5 mg once weekly thereafter.     Follow-up with me 6 to 8 weeks after initiation.    It was great speaking with you today.  I value your experience and would be very thankful for your time in providing feedback in our clinic survey. In the next few days, you may receive an email or text message from [employer] with a link to a survey related to your  clinical pharmacist.\"     To schedule another MTM appointment, please call the clinic directly or you may call the MTM scheduling line at [phone].    My Clinical Pharmacist's contact information:                                                      Please feel free to contact me with any questions or concerns you have.      Conrado Rivera, PharmD, BCACP  Medication Therapy Management Pharmacist  [employer]    "

## 2024-02-05 NOTE — NURSING NOTE
Is the patient currently in the state of [place]? YES    Visit mode:TELEPHONE    If the visit is dropped, the patient can be reconnected by: TELEPHONE VISIT: Phone number:   Telephone Information:   Mobile [phone]       Will anyone else be joining the visit? NO  (If patient encounters technical issues they should call [phone]    How would you like to obtain your AVS? MyChart    Are changes needed to the allergy or medication list? No    Reason for visit: Consult    Linda LARSEN

## 2024-02-09 ENCOUNTER — MYC REFILL (OUTPATIENT)
Dept: FAMILY MEDICINE | Facility: CLINIC | Age: 54
End: 2024-02-09
Payer: COMMERCIAL

## 2024-02-09 DIAGNOSIS — E66.813 CLASS 3 SEVERE OBESITY DUE TO EXCESS CALORIES WITH SERIOUS COMORBIDITY AND BODY MASS INDEX (BMI) OF 40.0 TO 44.9 IN ADULT (H): ICD-10-CM

## 2024-02-09 DIAGNOSIS — E11.9 TYPE 2 DIABETES MELLITUS WITHOUT COMPLICATION, WITHOUT LONG-TERM CURRENT USE OF INSULIN (H): ICD-10-CM

## 2024-02-09 DIAGNOSIS — E66.01 CLASS 3 SEVERE OBESITY DUE TO EXCESS CALORIES WITH SERIOUS COMORBIDITY AND BODY MASS INDEX (BMI) OF 40.0 TO 44.9 IN ADULT (H): ICD-10-CM

## 2024-02-09 DIAGNOSIS — E88.810 METABOLIC SYNDROME X: ICD-10-CM

## 2024-02-09 RX ORDER — PHENTERMINE HYDROCHLORIDE 15 MG/1
15 CAPSULE ORAL EVERY MORNING
Qty: 30 CAPSULE | Refills: 0 | Status: SHIPPED | OUTPATIENT
Start: 2024-02-09 | End: 2024-04-29

## 2024-02-12 ENCOUNTER — OFFICE VISIT (OUTPATIENT)
Dept: FAMILY MEDICINE | Facility: CLINIC | Age: 54
End: 2024-02-12
Payer: COMMERCIAL

## 2024-02-12 VITALS
WEIGHT: 205 LBS | TEMPERATURE: 98.7 F | OXYGEN SATURATION: 97 % | RESPIRATION RATE: 16 BRPM | BODY MASS INDEX: 40.25 KG/M2 | HEART RATE: 107 BPM | HEIGHT: 60 IN | DIASTOLIC BLOOD PRESSURE: 82 MMHG | SYSTOLIC BLOOD PRESSURE: 133 MMHG

## 2024-02-12 DIAGNOSIS — E66.01 CLASS 3 SEVERE OBESITY DUE TO EXCESS CALORIES WITH SERIOUS COMORBIDITY AND BODY MASS INDEX (BMI) OF 40.0 TO 44.9 IN ADULT (H): Primary | ICD-10-CM

## 2024-02-12 DIAGNOSIS — F10.11 HISTORY OF ALCOHOL ABUSE: ICD-10-CM

## 2024-02-12 DIAGNOSIS — E88.810 METABOLIC SYNDROME X: ICD-10-CM

## 2024-02-12 DIAGNOSIS — E66.813 CLASS 3 SEVERE OBESITY DUE TO EXCESS CALORIES WITH SERIOUS COMORBIDITY AND BODY MASS INDEX (BMI) OF 40.0 TO 44.9 IN ADULT (H): Primary | ICD-10-CM

## 2024-02-12 DIAGNOSIS — E11.9 TYPE 2 DIABETES MELLITUS WITHOUT COMPLICATION, WITHOUT LONG-TERM CURRENT USE OF INSULIN (H): ICD-10-CM

## 2024-02-12 DIAGNOSIS — I10 BENIGN ESSENTIAL HYPERTENSION: ICD-10-CM

## 2024-02-12 PROCEDURE — 99213 OFFICE O/P EST LOW 20 MIN: CPT | Performed by: FAMILY MEDICINE

## 2024-02-12 PROCEDURE — 82043 UR ALBUMIN QUANTITATIVE: CPT | Performed by: FAMILY MEDICINE

## 2024-02-12 PROCEDURE — 82570 ASSAY OF URINE CREATININE: CPT | Performed by: FAMILY MEDICINE

## 2024-02-12 NOTE — Clinical Note
Can you please send in the 2.5mg/wk zepbound for this patient?  She is still concerned about cost.  I am confused given her insurance and would assume she gets coverage.  Deductible?

## 2024-02-12 NOTE — ASSESSMENT & PLAN NOTE
53 year old year old female in clinic today to discuss treatment of the following conditions through diet and lifestyle modification and weight loss:  1. Class 3 severe obesity due to excess calories with serious comorbidity and body mass index (BMI) of 40.0 to 44.9 in adult (H)    2. Type 2 diabetes mellitus without complication, without long-term current use of insulin (H)    3. Cervical cancer screening    4. Benign essential hypertension    5. Metabolic syndrome X    6. History of alcohol abuse      The patient's weight loss result since the last visit was successful based on overall improved nutrition plan.  She is eliminated most of the products that she consumes that has sugar.  She feels subjectively better.  Her weight has responded favorably.  She is tolerant to phentermine/topiramate.  There is a question of coverage for Zepbound.  I believe her insurer will cover this medicine.  She got a bill when she tried to pick it up in the new year of $1100.  She has now gone through the [facility] pharmacist and had it approved.  Anticipate she will have coverage better than implied previously I will reach out to her pharmacist and ask to start the schedule of Zepbound.  I will see her back in June for her annual exam (and complete Pap smear at that time).

## 2024-02-12 NOTE — PROGRESS NOTES
Assessment & Plan   Problem List Items Addressed This Visit       Benign essential hypertension    Class 3 severe obesity due to excess calories with serious comorbidity and body mass index (BMI) of 40.0 to 44.9 in adult (H) - Primary     53 year old year old female in clinic today to discuss treatment of the following conditions through diet and lifestyle modification and weight loss:  1. Class 3 severe obesity due to excess calories with serious comorbidity and body mass index (BMI) of 40.0 to 44.9 in adult (H)    2. Type 2 diabetes mellitus without complication, without long-term current use of insulin (H)    3. Cervical cancer screening    4. Benign essential hypertension    5. Metabolic syndrome X    6. History of alcohol abuse    The patient's weight loss result since the last visit was successful based on overall improved nutrition plan.  She is eliminated most of the products that she consumes that has sugar.  She feels subjectively better.  Her weight has responded favorably.  She is tolerant to phentermine/topiramate.  There is a question of coverage for Zepbound.  I believe her insurer will cover this medicine.  She got a bill when she tried to pick it up in the new year of $1100.  She has now gone through the [facility] pharmacist and had it approved.  Anticipate she will have coverage better than implied previously I will reach out to her pharmacist and ask to start the schedule of Zepbound.  I will see her back in June for her annual exam (and complete Pap smear at that time).         History of alcohol abuse    Metabolic syndrome X    Type 2 diabetes mellitus without complication, without long-term current use of insulin (H)     Other Visit Diagnoses       Cervical cancer screening               Chidi Vital is a 53 year old, presenting for the following health issues:  Weight Loss (Follow-up/)        2/12/2024     1:50 PM   Additional Questions   Roomed by jeanette     Patient presents for treatment of  "chronic, comorbid conditions using intensive therapeutic lifestyle interventions including nutrition, physical activity, and behavior management.   - successes: no pop.  Some flavored water and diet soda.  Minimal sweets.  Energy and mood okay.    - struggles: frustrated about uncertainty for coverage   - movement: active on job   - tracking/journaling: [employer].  Portion control.  Tries to focus on protein,   - following nutritional plan: yes.  Deviations from plan: pasta, bagel, treats.  \"I am mindful.\"    - hunger: Improved.   - medication benefits: helpful. side effects: none.             Objective    /82 (BP Location: Left arm, Patient Position: Sitting, Cuff Size: Adult Regular)   Pulse 107   Temp 98.7  F (37.1  C) (Oral)   Resp 16   Ht 1.524 m (5')   Wt 93 kg (205 lb)   LMP  (LMP Unknown)   SpO2 97%   BMI 40.04 kg/m    Body mass index is 40.04 kg/m .  Physical Exam  Nursing note reviewed.   Constitutional:       General: She is not in acute distress.     Appearance: Normal appearance. She is not ill-appearing.   HENT:      Head: Normocephalic and atraumatic.   Eyes:      Extraocular Movements: Extraocular movements intact.      Conjunctiva/sclera: Conjunctivae normal.   Pulmonary:      Effort: Pulmonary effort is normal.   Neurological:      Mental Status: She is alert and oriented to person, place, and time.   Psychiatric:         Attention and Perception: Attention normal.         Mood and Affect: Mood normal.         Speech: Speech normal.         Thought Content: Thought content normal.                    Signed Electronically by: Abimael Walker MD    "

## 2024-02-13 LAB
CREAT UR-MCNC: 209 MG/DL
MICROALBUMIN UR-MCNC: <12 MG/L
MICROALBUMIN/CREAT UR: NORMAL MG/G{CREAT}

## 2024-03-04 ENCOUNTER — MYC MEDICAL ADVICE (OUTPATIENT)
Dept: FAMILY MEDICINE | Facility: CLINIC | Age: 54
End: 2024-03-04
Payer: COMMERCIAL

## 2024-03-07 ENCOUNTER — MYC REFILL (OUTPATIENT)
Dept: FAMILY MEDICINE | Facility: CLINIC | Age: 54
End: 2024-03-07
Payer: COMMERCIAL

## 2024-03-07 DIAGNOSIS — E88.810 METABOLIC SYNDROME X: ICD-10-CM

## 2024-03-07 DIAGNOSIS — E11.9 TYPE 2 DIABETES MELLITUS WITHOUT COMPLICATION, WITHOUT LONG-TERM CURRENT USE OF INSULIN (H): ICD-10-CM

## 2024-03-07 DIAGNOSIS — E66.813 CLASS 3 SEVERE OBESITY DUE TO EXCESS CALORIES WITH SERIOUS COMORBIDITY AND BODY MASS INDEX (BMI) OF 40.0 TO 44.9 IN ADULT (H): ICD-10-CM

## 2024-03-07 DIAGNOSIS — E66.01 CLASS 3 SEVERE OBESITY DUE TO EXCESS CALORIES WITH SERIOUS COMORBIDITY AND BODY MASS INDEX (BMI) OF 40.0 TO 44.9 IN ADULT (H): ICD-10-CM

## 2024-03-07 RX ORDER — TOPIRAMATE 25 MG/1
50 TABLET, FILM COATED ORAL
Qty: 60 TABLET | Refills: 1 | OUTPATIENT
Start: 2024-03-07

## 2024-03-07 RX ORDER — PHENTERMINE HYDROCHLORIDE 15 MG/1
15 CAPSULE ORAL EVERY MORNING
Qty: 30 CAPSULE | Refills: 0 | OUTPATIENT
Start: 2024-03-07

## 2024-03-07 NOTE — TELEPHONE ENCOUNTER
Left message to call back for: Patient  Information to relay to patient: Please relay provider message below.

## 2024-03-18 ENCOUNTER — VIRTUAL VISIT (OUTPATIENT)
Dept: CARDIOLOGY | Facility: CLINIC | Age: 54
End: 2024-03-18
Payer: COMMERCIAL

## 2024-03-18 VITALS — BODY MASS INDEX: 39.65 KG/M2 | WEIGHT: 203 LBS

## 2024-03-18 DIAGNOSIS — E66.813 CLASS 3 SEVERE OBESITY DUE TO EXCESS CALORIES WITH SERIOUS COMORBIDITY AND BODY MASS INDEX (BMI) OF 40.0 TO 44.9 IN ADULT (H): Primary | ICD-10-CM

## 2024-03-18 DIAGNOSIS — E66.01 CLASS 3 SEVERE OBESITY DUE TO EXCESS CALORIES WITH SERIOUS COMORBIDITY AND BODY MASS INDEX (BMI) OF 40.0 TO 44.9 IN ADULT (H): Primary | ICD-10-CM

## 2024-03-18 ASSESSMENT — PAIN SCALES - GENERAL: PAINLEVEL: NO PAIN (0)

## 2024-03-18 NOTE — PATIENT INSTRUCTIONS
"Recommendations from today's MTM visit:                                                    MTM (medication therapy management) is a service provided by a clinical pharmacist designed to help you get the most of out of your medicines.      Increase Zepbound to 5 mg once weekly as planned for at least 4 weeks.  If you have inadequate response to 5 mg Zepbound, increase to 7.5 mg once weekly.    Follow-up with Dr. Ba as scheduled in June.  We can plan on touching base after that point.  Please contact me with any questions or concerns.    It was great speaking with you today.  I value your experience and would be very thankful for your time in providing feedback in our clinic survey. In the next few days, you may receive an email or text message from [employer] with a link to a survey related to your  clinical pharmacist.\"     To schedule another MTM appointment, please call the clinic directly or you may call the MTM scheduling line at [phone].    My Clinical Pharmacist's contact information:                                                      Please feel free to contact me with any questions or concerns you have.      Conrado Rivera, PharmD, BCACP  Medication Therapy Management Pharmacist  [facility]    "

## 2024-03-18 NOTE — PROGRESS NOTES
Medication Therapy Management (MTM) Encounter    ASSESSMENT:                            Medication Adherence/Access: No issues identified    Weight management: Initiation of Zepbound has been well-tolerated with subtle/modest effects satiety and weight.  Notably improved response in comparison to Wegovy per patient report.  Some dietary related nausea, otherwise adverse effects are limited or not present.  Advised continued titration to 5 mg of Zepbound once weekly.  Did also discuss newly reported backorder/shortage of Zepbound 7.5 mg, potentially limiting temporarily further titration.  Will provide prescriptions for both 5 mg and 7.5 mg strengths, increase to 7.5 mg if inadequate response at 5 mg.      PLAN:                            Increase Zepbound to 5 mg once weekly as planned for at least 4 weeks.  If you have inadequate response to 5 mg Zepbound, increase to 7.5 mg once weekly.    Follow-up with Dr. Ba as scheduled in June.  We can plan on touching base after that point.  Please contact me with any questions or concerns.    SUBJECTIVE/OBJECTIVE:                          Zahraa Bull is a 53 year old female called for a follow-up visit.       Reason for visit: Zepbound follow-up.    Allergies/ADRs: Reviewed in chart  Past Medical History: Reviewed in chart  Tobacco: She reports that she quit smoking about 3 years ago. Her smoking use included cigarettes. She has been exposed to tobacco smoke. She has never used smokeless tobacco.      Medication Adherence/Access: no issues reported    Weight management:  Zepbound 2.5 mg weekly  Topiramate 25 mg 2 tabs qpm    Phone consult to follow-up initiation of Zepbound.  Today used last injection of 2.5 mg, hasn't had any troubles with any side effects. Hasn't observed weight change. Has encountered some troubles with greasy foods causing upset stomach. Has also been noting early satiety, requiring portion restriction. In comparison to Wegovy, where she felt she  was still able to overeat, with Zepbound has not been able to do the same. Stopped phentermine, continues Topiramate 25 mg at bedtime. Denies troubles with constipation, diarrhea, acid reflux. Does feel she is getting adequate caloric intake, estimates around 1200 calories, sometimes undershooting. Continued emphasis on protein and fiber.     Wt Readings from Last 4 Encounters:   03/18/24 92.1 kg (203 lb)   02/12/24 93 kg (205 lb)   02/05/24 94.2 kg (207 lb 9.6 oz)   12/15/23 98.4 kg (217 lb)     Body Mass Index (BMI) Body mass index is 39.65 kg/m .    Today's Vitals: Wt 92.1 kg (203 lb)   LMP  (LMP Unknown)   BMI 39.65 kg/m      Lab Results   Component Value Date    A1C 6.3 12/11/2023    A1C 6.5 05/22/2023    A1C 5.5 05/03/2021    A1C 6.5 06/02/2020    A1C 6.8 06/03/2019    A1C 6.0 07/25/2017     ----------------      I spent 15 minutes with this patient today. All changes were made via collaborative practice agreement with Tresa Regalado PA-C . A copy of the visit note was provided to the patient's provider(s).    A summary of these recommendations was sent via [employer].    Conrado Rivera, PharmD, BCACP  Medication Therapy Management Pharmacist  [facility]     Telemedicine Visit Details  Type of service:  Telephone visit  Start Time:  300pm  End Time:  315pm     Medication Therapy Recommendations  No medication therapy recommendations to display

## 2024-03-18 NOTE — NURSING NOTE
Is the patient currently in the state of [place]? YES    Visit mode:TELEPHONE    If the visit is dropped, the patient can be reconnected by: TELEPHONE VISIT: Phone number:   Telephone Information:   Mobile [phone]       Will anyone else be joining the visit? NO  (If patient encounters technical issues they should call [phone]    How would you like to obtain your AVS? MyChart    Are changes needed to the allergy or medication list? No    Reason for visit: NEAL LARSEN

## 2024-03-18 NOTE — Clinical Note
3/18/2024      RE: Mana Bull  [address] [facility] [address]       Dear Colleague,    Thank you for the opportunity to participate in the care of your patient, Mana Bull, at the [facility] [place] at [facility]. Please see a copy of my visit note below.    Medication Therapy Management (MTM) Encounter    ASSESSMENT:                            Medication Adherence/Access: No issues identified    Weight management: Initiation of Zepbound has been well-tolerated with subtle/modest effects satiety and weight.  Notably improved response in comparison to Wegovy per patient report.  Some dietary related nausea, otherwise adverse effects are limited or not present.  Advised continued titration to 5 mg of Zepbound once weekly.  Did also discuss newly reported backorder/shortage of Zepbound 7.5 mg, potentially limiting temporarily further titration.  Will provide prescriptions for both 5 mg and 7.5 mg strengths, increase to 7.5 mg if inadequate response at 5 mg.      PLAN:                            Increase Zepbound to 5 mg once weekly as planned for at least 4 weeks.  If you have inadequate response to 5 mg Zepbound, increase to 7.5 mg.    Follow-up with Dr. Ba as scheduled in June.  We can plan on touching base after that point.  Please contact me with any questions or concerns.    SUBJECTIVE/OBJECTIVE:                          Zahraa Bull is a 53 year old female called for a follow-up visit.       Reason for visit: Zepbound follow-up.    Allergies/ADRs: Reviewed in chart  Past Medical History: Reviewed in chart  Tobacco: She reports that she quit smoking about 3 years ago. Her smoking use included cigarettes. She has been exposed to tobacco smoke. She has never used smokeless tobacco.      Medication Adherence/Access: no issues reported    Weight management:  Zepbound 2.5 mg weekly  Topiramate 25 mg 2 tabs qpm    Phone  consult to follow-up initiation of Zepbound.  Today used last injection of 2.5 mg, hasn't had any troubles with any side effects. Hasn't observed weight change. Has encountered some troubles with greasy foods causing upset stomach. Has also been noting early satiety, requiring portion restriction. In comparison to Wegovy, where she felt she was still able to overeat, with Zepbound has not been able to do the same. Stopped phentermine, continues Topiramate 25 mg at bedtime. Denies troubles with constipation, diarrhea, acid reflux. Does feel she is getting adequate caloric intake, estimates around 1200 calories, sometimes undershooting. Continued emphasis on protein and fiber.     Wt Readings from Last 4 Encounters:   03/18/24 92.1 kg (203 lb)   02/12/24 93 kg (205 lb)   02/05/24 94.2 kg (207 lb 9.6 oz)   12/15/23 98.4 kg (217 lb)     Body Mass Index (BMI) Body mass index is 39.65 kg/m .    Today's Vitals: Wt 92.1 kg (203 lb)   LMP  (LMP Unknown)   BMI 39.65 kg/m      Lab Results   Component Value Date    A1C 6.3 12/11/2023    A1C 6.5 05/22/2023    A1C 5.5 05/03/2021    A1C 6.5 06/02/2020    A1C 6.8 06/03/2019    A1C 6.0 07/25/2017     ----------------      I spent 15 minutes with this patient today. All changes were made via collaborative practice agreement with Tresa Regalado PA-C . A copy of the visit note was provided to the patient's provider(s).    A summary of these recommendations was sent via Webt[employer].    Conrado Rivera, PharmD, BCACP  Medication Therapy Management Pharmacist  [facility]     Telemedicine Visit Details  Type of service:  Telephone visit  Start Time:  300pm  End Time:  315pm     Medication Therapy Recommendations  No medication therapy recommendations to display           Please do not hesitate to contact me if you have any questions/concerns.     Sincerely,     CONRADO RIVERA RPH

## 2024-03-18 NOTE — PROGRESS NOTES
"Virtual Visit Details    Type of service:  Telephone Visit   Phone call duration: *** minutes   Originating Location (pt. Location): {patient location:593054::\"Home\"}  {PROVIDER LOCATION On-site should be selected for visits conducted from your clinic location or adjoining [facility] hospital, academic office, or other nearby [facility] building. Off-site should be selected for all other provider locations, including home:[id number]}  Distant Location (provider location):  {virtual location provider:[id number]}  "

## 2024-03-20 ENCOUNTER — OFFICE VISIT (OUTPATIENT)
Dept: FAMILY MEDICINE | Facility: CLINIC | Age: 54
End: 2024-03-20
Payer: COMMERCIAL

## 2024-03-20 DIAGNOSIS — B00.1 COLD SORE: Primary | ICD-10-CM

## 2024-03-20 PROCEDURE — 99213 OFFICE O/P EST LOW 20 MIN: CPT | Performed by: STUDENT IN AN ORGANIZED HEALTH CARE EDUCATION/TRAINING PROGRAM

## 2024-03-20 RX ORDER — VALACYCLOVIR HYDROCHLORIDE 1 G/1
2000 TABLET, FILM COATED ORAL 2 TIMES DAILY
Qty: 4 TABLET | Refills: 0 | Status: SHIPPED | OUTPATIENT
Start: 2024-03-20 | End: 2024-03-20

## 2024-03-20 RX ORDER — VALACYCLOVIR HYDROCHLORIDE 1 G/1
2000 TABLET, FILM COATED ORAL 2 TIMES DAILY
Qty: 4 TABLET | Refills: 4 | Status: ON HOLD | OUTPATIENT
Start: 2024-03-20 | End: 2024-09-13

## 2024-03-20 NOTE — PROGRESS NOTES
Assessment & Plan     Cold sore  Zahraa is a 53-year-old female who presents today with recurrent cold sores, with prodrome of tingling sensation.  Last occurred two months ago but is usually years in between episodes.  Has vesicular appearance but crusted over with no fluid present currently, though patient reports clear fluid was coming out of sore yesterday.  Unable to get sample for HSV testing, but history and appearance on examination are consistent with herpes labialis.  Recommend treatment with valtrex, though effectiveness will likely be low due to taking the medication now 4 days since onset.  Will give her refills to take as needed, she understands that she needs to take this at the onset of her prodrome of tingling for maximum effectiveness.     - valACYclovir (VALTREX) 1000 mg tablet  Dispense: 4 tablet; Refill: 4        Subjective   Zahraa is a 53 year old, presenting for the following health issues:  She will get a cold sore on the lip that will last for a week or so at a time.  This current bump started on Sunday and it has gotten larger and wider, it is painful. It did have a clear fluid coming from it yesterday but this has stopped.  Two month ago had a similar lesion on the right lip.   This occurs every couple years.         2/12/2024     1:50 PM   Additional Questions   Roomed by xl     HPI       Reports that she gets cold sores however several years.  Usually it is years in between episodes however her last episode was only 2 months ago on the right lip.  On Sunday she noticed tingling on her left lower lip, and then a sore started to develop there which was exuding clear fluid, it became painful, and crusted over this morning.  This is similar to her prior cold sores that she has gotten.      Review of Systems  Constitutional, neuro, ENT, endocrine, pulmonary, cardiac, gastrointestinal, genitourinary, musculoskeletal, integument and psychiatric systems are negative, except as otherwise noted.       Objective    LMP  (LMP Unknown)   There is no height or weight on file to calculate BMI.  Physical Exam   GENERAL: alert and no distress  SKIN: Has 3 crusted over red papules which appear vesicular on the left lower lip.  No exudate present.    Office Visit on 02/12/2024   Component Date Value Ref Range Status    Creatinine Urine mg/dL 02/12/2024 209.0  mg/dL Final    The reference ranges have not been established in urine creatinine. The results should be integrated into the clinical context for interpretation.    Albumin Urine mg/L 02/12/2024 <12.0  mg/L Final    The reference ranges have not been established in urine albumin. The results should be integrated into the clinical context for interpretation.    Albumin Urine mg/g Cr 02/12/2024    Final    Unable to calculate, urine albumin and/or urine creatinine is outside detectable limits.  Microalbuminuria is defined as an albumin:creatinine ratio of 17 to 299 for males and 25 to 299 for females. A ratio of albumin:creatinine of 300 or higher is indicative of overt proteinuria.  Due to biologic variability, positive results should be confirmed by a second, first-morning random or 24-hour timed urine specimen. If there is discrepancy, a third specimen is recommended. When 2 out of 3 results are in the microalbuminuria range, this is evidence for incipient nephropathy and warrants increased efforts at glucose control, blood pressure control, and institution of therapy with an angiotensin-converting-enzyme (ACE) inhibitor (if the patient can tolerate it).           GFR Estimate   Date Value Ref Range Status   12/15/2023 >90 >60 mL/min/1.73m2 Final   05/03/2021 >60 >60 mL/min/1.73m2 Final   07/25/2017 >90  Non  GFR Calc   >60 mL/min/1.7m2 Final     GFR Estimate If Black   Date Value Ref Range Status   05/03/2021 >60 >60 mL/min/1.73m2 Final   07/25/2017 >90   GFR Calc   >60 mL/min/1.7m2 Final         Signed Electronically by: Kali  MD Pranay

## 2024-04-11 ENCOUNTER — MYC REFILL (OUTPATIENT)
Dept: FAMILY MEDICINE | Facility: CLINIC | Age: 54
End: 2024-04-11
Payer: COMMERCIAL

## 2024-04-11 DIAGNOSIS — E11.9 TYPE 2 DIABETES MELLITUS WITHOUT COMPLICATION, WITHOUT LONG-TERM CURRENT USE OF INSULIN (H): ICD-10-CM

## 2024-04-11 DIAGNOSIS — E66.01 CLASS 3 SEVERE OBESITY DUE TO EXCESS CALORIES WITH SERIOUS COMORBIDITY AND BODY MASS INDEX (BMI) OF 40.0 TO 44.9 IN ADULT (H): ICD-10-CM

## 2024-04-11 DIAGNOSIS — E66.813 CLASS 3 SEVERE OBESITY DUE TO EXCESS CALORIES WITH SERIOUS COMORBIDITY AND BODY MASS INDEX (BMI) OF 40.0 TO 44.9 IN ADULT (H): ICD-10-CM

## 2024-04-11 DIAGNOSIS — E88.810 METABOLIC SYNDROME X: ICD-10-CM

## 2024-04-11 RX ORDER — TOPIRAMATE 25 MG/1
50 TABLET, FILM COATED ORAL
Qty: 60 TABLET | Refills: 3 | Status: SHIPPED | OUTPATIENT
Start: 2024-04-11 | End: 2024-06-07

## 2024-04-13 ENCOUNTER — HEALTH MAINTENANCE LETTER (OUTPATIENT)
Age: 54
End: 2024-04-13

## 2024-04-24 ENCOUNTER — MYC MEDICAL ADVICE (OUTPATIENT)
Dept: FAMILY MEDICINE | Facility: CLINIC | Age: 54
End: 2024-04-24
Payer: COMMERCIAL

## 2024-04-24 NOTE — TELEPHONE ENCOUNTER
"OV 2/12/24 noted: \"The patient's weight loss result since the last visit was successful based on overall improved nutrition plan.  She is eliminated most of the products that she consumes that has sugar.  She feels subjectively better.  Her weight has responded favorably.  She is tolerant to phentermine/topiramate.  There is a question of coverage for Zepbound.  I believe her insurer will cover this medicine.  She got a bill when she tried to pick it up in the new year of $1100.  She has now gone through the [facility] pharmacist and had it approved.  Anticipate she will have coverage better than implied previously I will reach out to her pharmacist and ask to start the schedule of Zepbound.  \"  "

## 2024-04-29 ENCOUNTER — VIRTUAL VISIT (OUTPATIENT)
Dept: FAMILY MEDICINE | Facility: CLINIC | Age: 54
End: 2024-04-29
Payer: COMMERCIAL

## 2024-04-29 DIAGNOSIS — I10 BENIGN ESSENTIAL HYPERTENSION: ICD-10-CM

## 2024-04-29 DIAGNOSIS — E88.810 METABOLIC SYNDROME X: ICD-10-CM

## 2024-04-29 DIAGNOSIS — E11.9 TYPE 2 DIABETES MELLITUS WITHOUT COMPLICATION, WITHOUT LONG-TERM CURRENT USE OF INSULIN (H): ICD-10-CM

## 2024-04-29 DIAGNOSIS — E66.813 CLASS 3 SEVERE OBESITY DUE TO EXCESS CALORIES WITH SERIOUS COMORBIDITY AND BODY MASS INDEX (BMI) OF 40.0 TO 44.9 IN ADULT (H): Primary | ICD-10-CM

## 2024-04-29 DIAGNOSIS — E66.01 CLASS 3 SEVERE OBESITY DUE TO EXCESS CALORIES WITH SERIOUS COMORBIDITY AND BODY MASS INDEX (BMI) OF 40.0 TO 44.9 IN ADULT (H): Primary | ICD-10-CM

## 2024-04-29 PROCEDURE — 99214 OFFICE O/P EST MOD 30 MIN: CPT | Mod: 95 | Performed by: FAMILY MEDICINE

## 2024-04-29 PROCEDURE — G2211 COMPLEX E/M VISIT ADD ON: HCPCS | Mod: 95 | Performed by: FAMILY MEDICINE

## 2024-04-29 NOTE — ASSESSMENT & PLAN NOTE
"53 year old year old female in clinic today to discuss treatment of the following conditions through diet and lifestyle modification and weight loss:  1. Class 3 severe obesity due to excess calories with serious comorbidity and body mass index (BMI) of 40.0 to 44.9 in adult (H)    2. Type 2 diabetes mellitus without complication, without long-term current use of insulin (H)    3. Metabolic syndrome X    4. Benign essential hypertension      The patient's weight loss result since the last visit was mixed based on initial success. She will try zepbound for another month before Dcing altogether.   - she will discontinue topiramate   - try one more month of zepbound (either 5mg or 7.5 mg based on availability). Vary injection site.  Try simethicone.   - if struggling, go back to phentermine/topiramate.   - follow up during annual on 6/10.      BMI: There is no height or weight on file to calculate BMI.  Ideal body weight: 45.5 kg (100 lb 4.9 oz)  Adjusted ideal body weight: 64.1 kg (141 lb 6.2 oz)    Current therapies:    Movement:   - greatly improved.    Sleep:    - \"very good.\"    "

## 2024-04-29 NOTE — PROGRESS NOTES
"Zahraa is a 53 year old who is being evaluated via a billable video visit.    How would you like to obtain your AVS? MyChart  If the video visit is dropped, the invitation should be resent by: Send to e-mail at: [email or web address]  Will anyone else be joining your video visit? No      Assessment & Plan   Problem List Items Addressed This Visit       Benign essential hypertension    Class 3 severe obesity due to excess calories with serious comorbidity and body mass index (BMI) of 40.0 to 44.9 in adult (H) - Primary     53 year old year old female in clinic today to discuss treatment of the following conditions through diet and lifestyle modification and weight loss:  1. Class 3 severe obesity due to excess calories with serious comorbidity and body mass index (BMI) of 40.0 to 44.9 in adult (H)    2. Type 2 diabetes mellitus without complication, without long-term current use of insulin (H)    3. Metabolic syndrome X    4. Benign essential hypertension      The patient's weight loss result since the last visit was mixed based on initial success. She will try zepbound for another month before Dcing altogether.   - she will discontinue topiramate   - try one more month of zepbound (either 5mg or 7.5 mg based on availability). Vary injection site.  Try simethicone.   - if struggling, go back to phentermine/topiramate.   - follow up during annual on 6/10.      BMI: There is no height or weight on file to calculate BMI.  Ideal body weight: 45.5 kg (100 lb 4.9 oz)  Adjusted ideal body weight: 64.1 kg (141 lb 6.2 oz)    Current therapies:    Movement:   - greatly improved.    Sleep:    - \"very good.\"           Metabolic syndrome X    Type 2 diabetes mellitus without complication, without long-term current use of insulin (H)     The longitudinal plan of care for the diagnosis(es)/condition(s) as documented were addressed during this visit. Due to the added complexity in care, I will continue to support Zahraa in the " "subsequent management and with ongoing continuity of care.      Subjective   Zahraa is a 53 year old, presenting for the following health issues:  Weight Loss (Follow-up/)        4/29/2024     2:25 PM   Additional Questions   Roomed by xl     Patient presents for treatment of chronic, comorbid conditions using intensive therapeutic lifestyle interventions including nutrition, physical activity, and behavior management.   - successes: clothes are fitting differently.     - struggles: frustrated that she has not lost more weight.   - movement: 3-4x/week   - tracking/journaling: ad esther.  Portion control.   - following nutritional plan: yes - improving.  Deviations from plan: rare pizza or french fries.   - hunger: Improved.   - medication benefits: unclear to patient at this time. side effects: \"Rotten egg\" burps three days after injection.\" \"It lingers and is disgusting.\"  She has diarrhea, nausea, cramps if she eats something greasy.            Objective    Vitals - Patient Reported  Weight (Patient Reported): 90.4 kg (199 lb 6.4 oz)        Physical Exam   GENERAL: alert and no distress  EYES: Eyes grossly normal to inspection.  No discharge or erythema, or obvious scleral/conjunctival abnormalities.  RESP: No audible wheeze, cough, or visible cyanosis.    SKIN: Visible skin clear. No significant rash, abnormal pigmentation or lesions.  NEURO: Cranial nerves grossly intact.  Mentation and speech appropriate for age.  PSYCH: Appropriate affect, tone, and pace of words        Video-Visit Details    Type of service:  Video Visit   Originating Location (pt. Location): Home    Distant Location (provider location):  On-site  Platform used for Video Visit: Reji  Signed Electronically by: Abimael Walker MD    "

## 2024-05-03 ENCOUNTER — TELEPHONE (OUTPATIENT)
Dept: FAMILY MEDICINE | Facility: CLINIC | Age: 54
End: 2024-05-03
Payer: COMMERCIAL

## 2024-05-03 NOTE — TELEPHONE ENCOUNTER
Patient Quality Outreach    Patient is due for the following:   Diabetes -  A1C, Eye Exam, and Foot Exam  Physical Preventive Adult Physical    Next Steps:   Patient has upcoming appointment, these items will be addressed at that time.  Next Physical appointment x 6/10/24     My-chart sent pt for Diabetic Eye Exam record. xl  Type of outreach:    Sent [employer]Â® message.      Questions for provider review:    None           Jae Blanco MA

## 2024-05-08 ENCOUNTER — MYC MEDICAL ADVICE (OUTPATIENT)
Dept: FAMILY MEDICINE | Facility: CLINIC | Age: 54
End: 2024-05-08
Payer: COMMERCIAL

## 2024-05-08 DIAGNOSIS — E55.9 AVITAMINOSIS D: ICD-10-CM

## 2024-05-08 DIAGNOSIS — E66.01 CLASS 3 SEVERE OBESITY DUE TO EXCESS CALORIES WITH SERIOUS COMORBIDITY AND BODY MASS INDEX (BMI) OF 40.0 TO 44.9 IN ADULT (H): Primary | ICD-10-CM

## 2024-05-08 DIAGNOSIS — E66.813 CLASS 3 SEVERE OBESITY DUE TO EXCESS CALORIES WITH SERIOUS COMORBIDITY AND BODY MASS INDEX (BMI) OF 40.0 TO 44.9 IN ADULT (H): Primary | ICD-10-CM

## 2024-05-08 DIAGNOSIS — E88.810 METABOLIC SYNDROME X: ICD-10-CM

## 2024-05-08 DIAGNOSIS — I10 BENIGN ESSENTIAL HYPERTENSION: ICD-10-CM

## 2024-05-08 DIAGNOSIS — E11.9 TYPE 2 DIABETES MELLITUS WITHOUT COMPLICATION, WITHOUT LONG-TERM CURRENT USE OF INSULIN (H): ICD-10-CM

## 2024-05-08 RX ORDER — PHENTERMINE HYDROCHLORIDE 30 MG/1
30 CAPSULE ORAL EVERY MORNING
Qty: 90 CAPSULE | Refills: 0 | Status: SHIPPED | OUTPATIENT
Start: 2024-05-08 | End: 2024-06-10

## 2024-06-06 SDOH — HEALTH STABILITY: PHYSICAL HEALTH: ON AVERAGE, HOW MANY DAYS PER WEEK DO YOU ENGAGE IN MODERATE TO STRENUOUS EXERCISE (LIKE A BRISK WALK)?: 5 DAYS

## 2024-06-06 SDOH — HEALTH STABILITY: PHYSICAL HEALTH: ON AVERAGE, HOW MANY MINUTES DO YOU ENGAGE IN EXERCISE AT THIS LEVEL?: 60 MIN

## 2024-06-06 ASSESSMENT — SOCIAL DETERMINANTS OF HEALTH (SDOH): HOW OFTEN DO YOU GET TOGETHER WITH FRIENDS OR RELATIVES?: MORE THAN THREE TIMES A WEEK

## 2024-06-07 ENCOUNTER — MYC REFILL (OUTPATIENT)
Dept: FAMILY MEDICINE | Facility: CLINIC | Age: 54
End: 2024-06-07
Payer: COMMERCIAL

## 2024-06-07 DIAGNOSIS — E66.01 CLASS 3 SEVERE OBESITY DUE TO EXCESS CALORIES WITH SERIOUS COMORBIDITY AND BODY MASS INDEX (BMI) OF 40.0 TO 44.9 IN ADULT (H): ICD-10-CM

## 2024-06-07 DIAGNOSIS — E11.9 TYPE 2 DIABETES MELLITUS WITHOUT COMPLICATION, WITHOUT LONG-TERM CURRENT USE OF INSULIN (H): ICD-10-CM

## 2024-06-07 DIAGNOSIS — E88.810 METABOLIC SYNDROME X: ICD-10-CM

## 2024-06-07 DIAGNOSIS — E66.813 CLASS 3 SEVERE OBESITY DUE TO EXCESS CALORIES WITH SERIOUS COMORBIDITY AND BODY MASS INDEX (BMI) OF 40.0 TO 44.9 IN ADULT (H): ICD-10-CM

## 2024-06-07 RX ORDER — TOPIRAMATE 25 MG/1
50 TABLET, FILM COATED ORAL
Qty: 60 TABLET | Refills: 3 | Status: SHIPPED | OUTPATIENT
Start: 2024-06-07 | End: 2024-06-10

## 2024-06-10 ENCOUNTER — OFFICE VISIT (OUTPATIENT)
Dept: FAMILY MEDICINE | Facility: CLINIC | Age: 54
End: 2024-06-10
Payer: COMMERCIAL

## 2024-06-10 VITALS
RESPIRATION RATE: 16 BRPM | TEMPERATURE: 98.5 F | OXYGEN SATURATION: 98 % | HEIGHT: 61 IN | HEART RATE: 103 BPM | DIASTOLIC BLOOD PRESSURE: 83 MMHG | SYSTOLIC BLOOD PRESSURE: 122 MMHG | WEIGHT: 198 LBS | BODY MASS INDEX: 37.38 KG/M2

## 2024-06-10 DIAGNOSIS — R63.2 BINGE EATING: ICD-10-CM

## 2024-06-10 DIAGNOSIS — L73.2 HIDRADENITIS SUPPURATIVA OF MULTIPLE SITES: ICD-10-CM

## 2024-06-10 DIAGNOSIS — E11.9 TYPE 2 DIABETES MELLITUS WITHOUT COMPLICATION, WITHOUT LONG-TERM CURRENT USE OF INSULIN (H): Primary | ICD-10-CM

## 2024-06-10 DIAGNOSIS — Z12.4 CERVICAL CANCER SCREENING: ICD-10-CM

## 2024-06-10 DIAGNOSIS — F10.11 HISTORY OF ALCOHOL ABUSE: ICD-10-CM

## 2024-06-10 DIAGNOSIS — E66.01 CLASS 2 SEVERE OBESITY DUE TO EXCESS CALORIES WITH SERIOUS COMORBIDITY AND BODY MASS INDEX (BMI) OF 38.0 TO 38.9 IN ADULT (H): ICD-10-CM

## 2024-06-10 DIAGNOSIS — E66.812 CLASS 2 SEVERE OBESITY DUE TO EXCESS CALORIES WITH SERIOUS COMORBIDITY AND BODY MASS INDEX (BMI) OF 38.0 TO 38.9 IN ADULT (H): ICD-10-CM

## 2024-06-10 DIAGNOSIS — Z00.00 ROUTINE GENERAL MEDICAL EXAMINATION AT A HEALTH CARE FACILITY: ICD-10-CM

## 2024-06-10 DIAGNOSIS — E88.810 METABOLIC SYNDROME X: ICD-10-CM

## 2024-06-10 DIAGNOSIS — I10 BENIGN ESSENTIAL HYPERTENSION: ICD-10-CM

## 2024-06-10 LAB
HBA1C MFR BLD: 6.1 % (ref 0–5.6)
HOLD SPECIMEN: NORMAL
HOLD SPECIMEN: NORMAL

## 2024-06-10 PROCEDURE — 90471 IMMUNIZATION ADMIN: CPT | Performed by: FAMILY MEDICINE

## 2024-06-10 PROCEDURE — 36415 COLL VENOUS BLD VENIPUNCTURE: CPT | Performed by: FAMILY MEDICINE

## 2024-06-10 PROCEDURE — 90715 TDAP VACCINE 7 YRS/> IM: CPT | Performed by: FAMILY MEDICINE

## 2024-06-10 PROCEDURE — 80048 BASIC METABOLIC PNL TOTAL CA: CPT | Performed by: FAMILY MEDICINE

## 2024-06-10 PROCEDURE — 84460 ALANINE AMINO (ALT) (SGPT): CPT | Performed by: FAMILY MEDICINE

## 2024-06-10 PROCEDURE — 99214 OFFICE O/P EST MOD 30 MIN: CPT | Mod: 25 | Performed by: FAMILY MEDICINE

## 2024-06-10 PROCEDURE — G0145 SCR C/V CYTO,THINLAYER,RESCR: HCPCS | Performed by: FAMILY MEDICINE

## 2024-06-10 PROCEDURE — 99396 PREV VISIT EST AGE 40-64: CPT | Mod: 25 | Performed by: FAMILY MEDICINE

## 2024-06-10 PROCEDURE — 87624 HPV HI-RISK TYP POOLED RSLT: CPT | Performed by: FAMILY MEDICINE

## 2024-06-10 PROCEDURE — 83036 HEMOGLOBIN GLYCOSYLATED A1C: CPT | Performed by: FAMILY MEDICINE

## 2024-06-10 RX ORDER — LISDEXAMFETAMINE DIMESYLATE 30 MG/1
30 CAPSULE ORAL EVERY MORNING
Qty: 15 CAPSULE | Refills: 0 | Status: SHIPPED | OUTPATIENT
Start: 2024-06-10 | End: 2024-06-20

## 2024-06-10 ASSESSMENT — ENCOUNTER SYMPTOMS
PALPITATIONS: 0
FEVER: 0
COLOR CHANGE: 0
SORE THROAT: 0
ARTHRALGIAS: 0
HEMATURIA: 0
CHILLS: 0
EYE PAIN: 0
BACK PAIN: 0
SHORTNESS OF BREATH: 0
COUGH: 0
VOMITING: 0
SEIZURES: 0
ABDOMINAL PAIN: 0
DYSURIA: 0

## 2024-06-10 NOTE — ASSESSMENT & PLAN NOTE
Blood pressure controlled and has not been an issue with recent measurements.  This is likely result of improvements of overall lifestyle.  Will continue to monitor.

## 2024-06-10 NOTE — PROGRESS NOTES
Preventive Care Visit  [facility]  Abimael Walker MD, Family Medicine  Sudhir 10, 2024      Assessment & Plan   Problem List Items Addressed This Visit       Benign essential hypertension     Blood pressure controlled and has not been an issue with recent measurements.  This is likely result of improvements of overall lifestyle.  Will continue to monitor.         Binge eating     Binge eating behaviors have actually become the main barrier to her achieving weight loss.  Obesity discussion as shown below.         Relevant Medications    lisdexamfetamine (VYVANSE) 30 MG capsule    Class 2 severe obesity due to excess calories with serious comorbidity and body mass index (BMI) of 38.0 to 38.9 in adult (H)     53 year old year old female in clinic today to discuss treatment of the following conditions through diet and lifestyle modification and weight loss:  1. Type 2 diabetes mellitus without complication, without long-term current use of insulin (H)    2. Cervical cancer screening    3. Class 2 severe obesity due to excess calories with serious comorbidity and body mass index (BMI) of 38.0 to 38.9 in adult (H)    4. Metabolic syndrome X    5. Routine general medical examination at a health care facility    6. History of alcohol abuse    7. Binge eating    8. Hidradenitis suppurativa of multiple sites    9. Benign essential hypertension      The patient's weight loss result since the last visit was mixed based on weight loss without clear plan.  She is struggling with impulsivity around binging behaviors, in particular on days in which she is stressed.  Overall, she continues to work to eat consistently.  She is walking and physically active.  - For now, stop phentermine/topiramate.  Replace this with Vyvanse and plan to titrate with a goal of reducing binging behaviors.  - GLP-1 receptor agonist have not been tolerated.  5 mg of tirzepatide resulted in her struggling with burping and foul  "taste in her mouth.  - Plan to recheck in 3 months.  MyChart communication regarding effect of Vyvanse sooner.         Relevant Medications    lisdexamfetamine (VYVANSE) 30 MG capsule    Other Relevant Orders    Basic metabolic panel  (Ca, Cl, CO2, Creat, Gluc, K, Na, BUN)    ALT    Hidradenitis suppurativa of multiple sites    History of alcohol abuse     2 years no alcohol!  Doing well.          Metabolic syndrome X    Relevant Orders    Basic metabolic panel  (Ca, Cl, CO2, Creat, Gluc, K, Na, BUN)    ALT    Type 2 diabetes mellitus without complication, without long-term current use of insulin (H) - Primary     Diabetes: Well-controlled.  On a statin.  On aspirin.  Hemoglobin A1c within target limits.  Blood pressure controlled.  Non-smoker.         Relevant Orders    HEMOGLOBIN A1C (Completed)     Other Visit Diagnoses       Cervical cancer screening        Relevant Orders    Pap Screen with HPV - Recommended Age 30 - 65 Years    Routine general medical examination at a health care facility                 Patient has been advised of split billing requirements and indicates understanding: Yes     BMI  Estimated body mass index is 38.03 kg/m  as calculated from the following:    Height as of this encounter: 1.537 m (5' 0.5\").    Weight as of this encounter: 89.8 kg (198 lb).   Weight management plan: Specific weight management program called comprehensive weight management discussed    Counseling  Appropriate preventive services were discussed with this patient, including applicable screening as appropriate for fall prevention, nutrition, physical activity, Tobacco-use cessation, weight loss and cognition.  Checklist reviewing preventive services available has been given to the patient.  Reviewed patient's diet, addressing concerns and/or questions.     Chidi Vital is a 53 year old, presenting for the following:  Physical (Fasting)        6/10/2024    11:03 AM   Additional Questions   Roomed by jeanette    " "    Health Care Directive  Patient does not have a Health Care Directive or Living Will: Discussed advance care planning with patient; information given to patient to review.    Metabolic health:   - more cravings.  Binging has been worse recently with sweets.  Worse with stress.  M&Ms.    - no alcohol for the past 2 years   - she is back to living with family.  \"Dynamic is still crazy.\" She lives with mother and older sister currently which is going very well.    - stress: \"day to day.\"  She works ~40 hours per week.   - phentermine started as a replacement for GLP (side effects nausea, burps).  Helps curb appetite.  Currently she thinks that it is helpful.    -no SSBs.  Some diet soda.              6/6/2024   General Health   How would you rate your overall physical health? Good   Feel stress (tense, anxious, or unable to sleep) Not at all         6/6/2024   Nutrition   Three or more servings of calcium each day? (!) NO   Diet: Low fat/cholesterol    Diabetic    Carbohydrate counting    Breakfast skipped   How many servings of fruit and vegetables per day? (!) 2-3   How many sweetened beverages each day? 0-1         6/6/2024   Exercise   Days per week of moderate/strenous exercise 5 days   Average minutes spent exercising at this level 60 min         6/6/2024   Social Factors   Frequency of gathering with friends or relatives More than three times a week   Worry food won't last until get money to buy more No   Food not last or not have enough money for food? No   Do you have housing?  Yes   Are you worried about losing your housing? No   Lack of transportation? No   Unable to get utilities (heat,electricity)? No         6/6/2024   Fall Risk   Fallen 2 or more times in the past year? No   Trouble with walking or balance? No          6/6/2024   Dental   Dentist two times every year? Yes         Today's PHQ-2 Score:       3/18/2024     2:49 PM   PHQ-2 ( 1999 [employer])   Q1: Little interest or pleasure in doing things 0 "   Q2: Feeling down, depressed or hopeless 0   PHQ-2 Score 0         2024   Substance Use   Alcohol more than 3/day or more than 7/wk Not Applicable   Do you use any other substances recreationally? No     Social History     Tobacco Use    Smoking status: Former     Current packs/day: 0.00     Types: Cigarettes     Quit date: 2000     Years since quittin.9     Passive exposure: Past    Smokeless tobacco: Never   Vaping Use    Vaping status: Never Used   Substance Use Topics    Alcohol use: Not Currently     Comment: OCCASTIONAL    Drug use: No             2024   Breast Cancer Screening   Family history of breast, colon, or ovarian cancer? Yes         2024   LAST FHS-7 RESULTS   1st degree relative breast or ovarian cancer Yes   Any relative bilateral breast cancer No   Any male have breast cancer Unknown   Any ONE woman have BOTH breast AND ovarian cancer No   Any woman with breast cancer before 50yrs No   2 or more relatives with breast AND/OR ovarian cancer No   2 or more relatives with breast AND/OR bowel cancer No        Mammogram Screening - Mammogram every 1-2 years updated in Health Maintenance based on mutual decision making        2024   STI Screening   New sexual partner(s) since last STI/HIV test? No     History of abnormal Pap smear: No - age 30- 64 PAP with HPV every 5 years recommended       ASCVD Risk   The 10-year ASCVD risk score (Gerson AWAN, et al., 2019) is: 7.2%    Values used to calculate the score:      Age: 53 years      Sex: Female      Is Non- : Yes      Diabetic: Yes      Tobacco smoker: No      Systolic Blood Pressure: 122 mmHg      Is BP treated: No      HDL Cholesterol: 41 mg/dL      Total Cholesterol: 237 mg/dL       Reviewed and updated as needed this visit by Provider   Tobacco  Allergies  Meds  Problems  Med Hx  Surg Hx  Fam Hx            Review of Systems   Constitutional:  Negative for chills and fever.   HENT:   "Negative for ear pain and sore throat.    Eyes:  Negative for pain and visual disturbance.   Respiratory:  Negative for cough and shortness of breath.    Cardiovascular:  Negative for chest pain and palpitations.   Gastrointestinal:  Negative for abdominal pain and vomiting.   Genitourinary:  Negative for dysuria and hematuria.   Musculoskeletal:  Negative for arthralgias and back pain.   Skin:  Negative for color change and rash.   Neurological:  Negative for seizures and syncope.   All other systems reviewed and are negative.           Objective    Exam  /83 (BP Location: Left arm, Patient Position: Sitting, Cuff Size: Adult Regular)   Pulse 103   Temp 98.5  F (36.9  C)   Resp 16   Ht 1.537 m (5' 0.5\")   Wt 89.8 kg (198 lb)   LMP  (LMP Unknown)   SpO2 98%   BMI 38.03 kg/m     Estimated body mass index is 38.03 kg/m  as calculated from the following:    Height as of this encounter: 1.537 m (5' 0.5\").    Weight as of this encounter: 89.8 kg (198 lb).    Physical Exam  Vitals reviewed.   Constitutional:       General: She is not in acute distress.     Appearance: Normal appearance. She is not ill-appearing.   HENT:      Head: Normocephalic and atraumatic.      Right Ear: External ear normal.      Left Ear: External ear normal.      Nose: Nose normal.      Mouth/Throat:      Pharynx: Oropharynx is clear. No oropharyngeal exudate or posterior oropharyngeal erythema.   Eyes:      General: No scleral icterus.        Right eye: No discharge.         Left eye: No discharge.      Extraocular Movements: Extraocular movements intact.      Conjunctiva/sclera: Conjunctivae normal.      Pupils: Pupils are equal, round, and reactive to light.   Neck:      Comments: No thyromegaly.  Cardiovascular:      Rate and Rhythm: Normal rate and regular rhythm.      Heart sounds: Normal heart sounds. No murmur heard.     No friction rub. No gallop.   Pulmonary:      Effort: Pulmonary effort is normal. No respiratory distress. "      Breath sounds: Normal breath sounds. No wheezing or rales.   Abdominal:      General: There is no distension.      Palpations: Abdomen is soft. There is no mass.      Tenderness: There is no abdominal tenderness.   Musculoskeletal:         General: No signs of injury. Normal range of motion.      Cervical back: Normal range of motion.      Right lower leg: No edema.      Left lower leg: No edema.   Lymphadenopathy:      Cervical: No cervical adenopathy.   Skin:     General: Skin is warm.      Coloration: Skin is not jaundiced.      Findings: No rash.   Neurological:      General: No focal deficit present.      Mental Status: She is alert and oriented to person, place, and time.      Cranial Nerves: No cranial nerve deficit.      Deep Tendon Reflexes: Reflexes normal.   Psychiatric:         Mood and Affect: Mood normal.           Signed Electronically by: Abimael Walker MD

## 2024-06-10 NOTE — ASSESSMENT & PLAN NOTE
Binge eating behaviors have actually become the main barrier to her achieving weight loss.  Obesity discussion as shown below.

## 2024-06-10 NOTE — ASSESSMENT & PLAN NOTE
53 year old year old female in clinic today to discuss treatment of the following conditions through diet and lifestyle modification and weight loss:  1. Type 2 diabetes mellitus without complication, without long-term current use of insulin (H)    2. Cervical cancer screening    3. Class 2 severe obesity due to excess calories with serious comorbidity and body mass index (BMI) of 38.0 to 38.9 in adult (H)    4. Metabolic syndrome X    5. Routine general medical examination at a health care facility    6. History of alcohol abuse    7. Binge eating    8. Hidradenitis suppurativa of multiple sites    9. Benign essential hypertension      The patient's weight loss result since the last visit was mixed based on weight loss without clear plan.  She is struggling with impulsivity around binging behaviors, in particular on days in which she is stressed.  Overall, she continues to work to eat consistently.  She is walking and physically active.  - For now, stop phentermine/topiramate.  Replace this with Vyvanse and plan to titrate with a goal of reducing binging behaviors.  - GLP-1 receptor agonist have not been tolerated.  5 mg of tirzepatide resulted in her struggling with burping and foul taste in her mouth.  - Plan to recheck in 3 months.  MyChart communication regarding effect of Vyvanse sooner.

## 2024-06-10 NOTE — PATIENT INSTRUCTIONS
"Preventive Care Advice   This is general advice we often give to help people stay healthy. Your care team may have specific advice just for you. Please talk to your care team about your own preventive care needs.  Lifestyle  Exercise at least 150 minutes each week (30 minutes a day, 5 days a week).  Do muscle strengthening activities 2 days a week. These help control your weight and prevent disease.  No smoking.  Wear sunscreen to prevent skin cancer.  Have your home tested for radon every 2 to 5 years. Radon is a colorless, odorless gas that can harm your lungs. To learn more, go to www.health.[employer].mn. and search for \"Radon in Homes.\"  Keep guns unloaded and locked up in a safe place like a safe or gun vault, or, use a gun lock and hide the keys. Always lock away bullets separately. To learn more, visit [employer].gov and search for \"safe gun storage.\"  Nutrition  Eat 5 or more servings of fruits and vegetables each day.  Try wheat bread, brown rice and whole grain pasta (instead of white bread, rice, and pasta).  Get enough calcium and vitamin D. Check the label on foods and aim for 100% of the RDA (recommended daily allowance).  Regular exams  Have a dental exam and cleaning every 6 months.  See your health care team every year to talk about:  Any changes in your health.  Any medicines your care team has prescribed.  Preventive care, family planning, and ways to prevent chronic diseases.  Shots (vaccines)   HPV shots (up to age 26), if you've never had them before.  Hepatitis B shots (up to age 59), if you've never had them before.  COVID-19 shot: Get this shot when it's due.  Flu shot: Get a flu shot every year.  Tetanus shot: Get a tetanus shot every 10 years.  Pneumococcal, hepatitis A, and RSV shots: Ask your care team if you need these based on your risk.  Shingles shot (for age 50 and up).  General health tests  Diabetes screening:  Starting at age 35, Get screened for diabetes at least every 3 years.  If " you are younger than age 35, ask your care team if you should be screened for diabetes.  Cholesterol test: At age 39, start having a cholesterol test every 5 years, or more often if advised.  Bone density scan (DEXA): At age 50, ask your care team if you should have this scan for osteoporosis (brittle bones).  Hepatitis C: Get tested at least once in your life.  Abdominal aortic aneurysm screening: Talk to your doctor about having this screening if you:  Have ever smoked; and  Are biologically male; and  Are between the ages of 65 and 75.  STIs (sexually transmitted infections)  Before age 24: Ask your care team if you should be screened for STIs.  After age 24: Get screened for STIs if you're at risk. You are at risk for STIs (including HIV) if:  You are sexually active with more than one person.  You don't use condoms every time.  You or a partner was diagnosed with a sexually transmitted infection.  If you are at risk for HIV, ask about PrEP medicine to prevent HIV.  Get tested for HIV at least once in your life, whether you are at risk for HIV or not.  Cancer screening tests  Cervical cancer screening: If you have a cervix, begin getting regular cervical cancer screening tests at age 21. Most people who have regular screenings with normal results can stop after age 65. Talk about this with your provider.  Breast cancer scan (mammogram): If you've ever had breasts, begin having regular mammograms starting at age 40. This is a scan to check for breast cancer.  Colon cancer screening: It is important to start screening for colon cancer at age 45.  Have a colonoscopy test every 10 years (or more often if you're at risk) Or, ask your provider about stool tests like a FIT test every year or Cologuard test every 3 years.  To learn more about your testing options, visit: www.[employer]/895671.pdf.  For help making a decision, visit: moe/af04673.  Prostate cancer screening test: If you have a prostate and are age 55  to 69, ask your provider if you would benefit from a yearly prostate cancer screening test.  Lung cancer screening: If you are a current or former smoker age 50 to 80, ask your care team if ongoing lung cancer screenings are right for you.  For informational purposes only. Not to replace the advice of your health care provider. Copyright   2023 [employer]. All rights reserved. Clinically reviewed by the [facility]. [employer] 966101 - REV 04/24.

## 2024-06-10 NOTE — ASSESSMENT & PLAN NOTE
Diabetes: Well-controlled.  On a statin.  On aspirin.  Hemoglobin A1c within target limits.  Blood pressure controlled.  Non-smoker.

## 2024-06-11 LAB
ALT SERPL W P-5'-P-CCNC: 13 U/L (ref 0–50)
ANION GAP SERPL CALCULATED.3IONS-SCNC: 8 MMOL/L (ref 7–15)
BUN SERPL-MCNC: 6.1 MG/DL (ref 6–20)
CALCIUM SERPL-MCNC: 9.7 MG/DL (ref 8.6–10)
CHLORIDE SERPL-SCNC: 108 MMOL/L (ref 98–107)
CREAT SERPL-MCNC: 1.06 MG/DL (ref 0.51–0.95)
DEPRECATED HCO3 PLAS-SCNC: 23 MMOL/L (ref 22–29)
EGFRCR SERPLBLD CKD-EPI 2021: 63 ML/MIN/1.73M2
GLUCOSE SERPL-MCNC: 112 MG/DL (ref 70–99)
POTASSIUM SERPL-SCNC: 4.9 MMOL/L (ref 3.4–5.3)
SODIUM SERPL-SCNC: 139 MMOL/L (ref 135–145)

## 2024-06-12 LAB
HPV HR 12 DNA CVX QL NAA+PROBE: POSITIVE
HPV16 DNA CVX QL NAA+PROBE: NEGATIVE
HPV18 DNA CVX QL NAA+PROBE: NEGATIVE
HUMAN PAPILLOMA VIRUS FINAL DIAGNOSIS: ABNORMAL

## 2024-06-14 LAB
BKR LAB AP GYN ADEQUACY: NORMAL
BKR LAB AP GYN INTERPRETATION: NORMAL
BKR LAB AP PREVIOUS ABNORMAL: NORMAL
PATH REPORT.COMMENTS IMP SPEC: NORMAL
PATH REPORT.COMMENTS IMP SPEC: NORMAL
PATH REPORT.RELEVANT HX SPEC: NORMAL

## 2024-06-17 ENCOUNTER — PATIENT OUTREACH (OUTPATIENT)
Dept: FAMILY MEDICINE | Facility: CLINIC | Age: 54
End: 2024-06-17
Payer: COMMERCIAL

## 2024-06-17 PROBLEM — R87.810 CERVICAL HIGH RISK HPV (HUMAN PAPILLOMAVIRUS) TEST POSITIVE: Status: ACTIVE | Noted: 2024-06-17

## 2024-06-19 ENCOUNTER — MYC MEDICAL ADVICE (OUTPATIENT)
Dept: FAMILY MEDICINE | Facility: CLINIC | Age: 54
End: 2024-06-19
Payer: COMMERCIAL

## 2024-06-19 DIAGNOSIS — R63.2 BINGE EATING: ICD-10-CM

## 2024-06-20 RX ORDER — LISDEXAMFETAMINE DIMESYLATE 50 MG/1
50 CAPSULE ORAL EVERY MORNING
Qty: 30 CAPSULE | Refills: 0 | Status: SHIPPED | OUTPATIENT
Start: 2024-06-20 | End: 2024-09-05

## 2024-09-05 ENCOUNTER — MYC MEDICAL ADVICE (OUTPATIENT)
Dept: FAMILY MEDICINE | Facility: CLINIC | Age: 54
End: 2024-09-05

## 2024-09-05 ENCOUNTER — OFFICE VISIT (OUTPATIENT)
Dept: INTERNAL MEDICINE | Facility: CLINIC | Age: 54
End: 2024-09-05
Payer: COMMERCIAL

## 2024-09-05 ENCOUNTER — TELEPHONE (OUTPATIENT)
Dept: INTERNAL MEDICINE | Facility: CLINIC | Age: 54
End: 2024-09-05

## 2024-09-05 VITALS
SYSTOLIC BLOOD PRESSURE: 139 MMHG | DIASTOLIC BLOOD PRESSURE: 89 MMHG | TEMPERATURE: 98.4 F | WEIGHT: 207 LBS | OXYGEN SATURATION: 98 % | HEART RATE: 70 BPM | BODY MASS INDEX: 39.76 KG/M2

## 2024-09-05 DIAGNOSIS — E11.9 TYPE 2 DIABETES MELLITUS WITHOUT COMPLICATION, WITHOUT LONG-TERM CURRENT USE OF INSULIN (H): ICD-10-CM

## 2024-09-05 DIAGNOSIS — M79.2 NEURALGIA AND NEURITIS: ICD-10-CM

## 2024-09-05 DIAGNOSIS — M62.838 MUSCLE SPASM: ICD-10-CM

## 2024-09-05 DIAGNOSIS — R10.9 RIGHT FLANK PAIN: ICD-10-CM

## 2024-09-05 DIAGNOSIS — R10.11 ABDOMINAL PAIN, RIGHT UPPER QUADRANT: Primary | ICD-10-CM

## 2024-09-05 PROCEDURE — 99214 OFFICE O/P EST MOD 30 MIN: CPT | Mod: 25 | Performed by: INTERNAL MEDICINE

## 2024-09-05 PROCEDURE — 96372 THER/PROPH/DIAG INJ SC/IM: CPT | Performed by: INTERNAL MEDICINE

## 2024-09-05 RX ORDER — CYCLOBENZAPRINE HCL 5 MG
5 TABLET ORAL 3 TIMES DAILY PRN
Qty: 30 TABLET | Refills: 0 | Status: SHIPPED | OUTPATIENT
Start: 2024-09-05

## 2024-09-05 RX ORDER — PREGABALIN 50 MG/1
50 CAPSULE ORAL 2 TIMES DAILY
Qty: 60 CAPSULE | Refills: 1 | Status: SHIPPED | OUTPATIENT
Start: 2024-09-05

## 2024-09-05 RX ORDER — LIDOCAINE 50 MG/G
1 PATCH TOPICAL EVERY 24 HOURS
Qty: 7 PATCH | Refills: 1 | Status: ON HOLD | OUTPATIENT
Start: 2024-09-05 | End: 2024-09-13

## 2024-09-05 RX ORDER — KETOROLAC TROMETHAMINE 30 MG/ML
60 INJECTION, SOLUTION INTRAMUSCULAR; INTRAVENOUS ONCE
Status: COMPLETED | OUTPATIENT
Start: 2024-09-05 | End: 2024-09-05

## 2024-09-05 RX ADMIN — KETOROLAC TROMETHAMINE 60 MG: 30 INJECTION, SOLUTION INTRAMUSCULAR; INTRAVENOUS at 11:33

## 2024-09-05 NOTE — PROGRESS NOTES
Assessment & Plan     Abdominal pain, right upper quadrant  54 yo female present for evaluation of the right lower chest wall/abdominal pain that radiates to hr right lateral back, started a week ago. Pain is constant and sharp, and skin is hypersensitive in this area just on the minimal touch, but there is no rash or redness. Pain is not worse with the deep breath or movement, but can be worse when laying on her side.No trauma, but maybe injured herself dancing on the weeding a week before. She denies fever, chills, cough, recent travel, leg swelling. She smokes cigarettes.  No change in the urine and stool.  She was seen for these symptoms in the UC on 8/31/24.  Labs showed normal BMP, CBC, d -dimmer, troponin.    CXR 8/31/24:  Heart size and vascularity are normal. Unchanged linear subsegmental atelectasis versus scar within the lingula and right middle lobe. No focal consolidation, pneumothorax nor pleural effusion.     She was taking Advil and Tylenol scheduled over the last few days but no relief.     On the exam, no rash, redness of the skin. Very sensitive on the minimal touch on the RUQ and right side lateral back. Tender on the pressure above the liver and gallbladder. Lungs clear.     Possible pleurisy vs costohonditis vs shingle prodrome vs neuritis and muscle spasm.  She responded well to Toradol IM injection in the clinic, but pain did not go away.  We discuss OTC meds and she will take muscle relaxant and Lyrica.  She will schedule abdominal US.    - US Abdomen Complete; Future    Right flank pain  No tenderness on the CVA percussion.  - US Abdomen Complete; Future    Type 2 diabetes mellitus without complication, without long-term current use of insulin (H)  Diet controlled at this point.  Component      Latest Ref Rng 6/10/2024  11:01 AM   Hemoglobin A1C      0.0 - 5.6 % 6.1 (H)         Neuralgia and neuritis    - pregabalin (LYRICA) 50 MG capsule; Take 1 capsule (50 mg) by mouth 2 times daily.  -  lidocaine (LIDODERM) 5 % patch; Place 1 patch over 12 hours onto the skin every 24 hours. To prevent lidocaine toxicity, patient should be patch free for 12 hrs daily.  - ketorolac (TORADOL) injection 60 mg    Muscle spasm    - cyclobenzaprine (FLEXERIL) 5 MG tablet; Take 1 tablet (5 mg) by mouth 3 times daily as needed for muscle spasms.  - ketorolac (TORADOL) injection 60 mg                Chidi Vital is a 53 year old, presenting for the following health issues:  Flank Pain        9/5/2024    11:00 AM   Additional Questions   Roomed by Paulette BARON     History of Present Illness       Reason for visit:  Side Pain   She is taking medications regularly.                     Objective    /89 (BP Location: Left arm, Patient Position: Sitting)   Pulse 70   Temp 98.4  F (36.9  C) (Oral)   Wt 93.9 kg (207 lb)   LMP  (LMP Unknown)   SpO2 98%   BMI 39.76 kg/m    Body mass index is 39.76 kg/m .  Physical Exam               Signed Electronically by: Lane Crum MD

## 2024-09-05 NOTE — TELEPHONE ENCOUNTER
Reason for Call:  Appointment Request    Patient requesting this type of appt:  Patient is requesting to see Lane Crum MD for severe right side pain she's having. Patient declined triage.    Requested provider:  Lane Crum MD    Reason patient unable to be scheduled: Not within requested timeframe    When does patient want to be seen/preferred time: Same day    Comments: Patient specifically requested Lane Crum MD    Could we send this information to you in [facility] [place] or would you prefer to receive a phone call?:   Patient would prefer a phone call   Okay to leave a detailed message?: Yes at Cell number on file:    Telephone Information:   Mobile [phone]       Call taken on 9/5/2024 at 9:13 AM by Sita Kenney

## 2024-09-06 ENCOUNTER — HOSPITAL ENCOUNTER (OUTPATIENT)
Dept: ULTRASOUND IMAGING | Facility: CLINIC | Age: 54
Discharge: HOME | End: 2024-09-06
Attending: INTERNAL MEDICINE | Admitting: INTERNAL MEDICINE
Payer: COMMERCIAL

## 2024-09-06 DIAGNOSIS — R93.89 ABNORMAL ULTRASOUND: ICD-10-CM

## 2024-09-06 DIAGNOSIS — K81.1 CHRONIC CHOLECYSTITIS: Primary | ICD-10-CM

## 2024-09-06 DIAGNOSIS — R10.9 RIGHT FLANK PAIN: ICD-10-CM

## 2024-09-06 DIAGNOSIS — R10.11 ABDOMINAL PAIN, RIGHT UPPER QUADRANT: ICD-10-CM

## 2024-09-06 DIAGNOSIS — K82.4 GALLBLADDER POLYP: ICD-10-CM

## 2024-09-06 PROCEDURE — 76705 ECHO EXAM OF ABDOMEN: CPT

## 2024-09-11 ENCOUNTER — OFFICE VISIT (OUTPATIENT)
Dept: SURGERY | Facility: CLINIC | Age: 54
End: 2024-09-11
Attending: INTERNAL MEDICINE
Payer: COMMERCIAL

## 2024-09-11 VITALS
WEIGHT: 203 LBS | HEIGHT: 61 IN | DIASTOLIC BLOOD PRESSURE: 101 MMHG | HEART RATE: 115 BPM | SYSTOLIC BLOOD PRESSURE: 167 MMHG | BODY MASS INDEX: 38.33 KG/M2

## 2024-09-11 DIAGNOSIS — K82.4 GALLBLADDER POLYP: ICD-10-CM

## 2024-09-11 DIAGNOSIS — K81.1 CHRONIC CHOLECYSTITIS: ICD-10-CM

## 2024-09-11 PROCEDURE — 99204 OFFICE O/P NEW MOD 45 MIN: CPT | Performed by: SURGERY

## 2024-09-11 RX ORDER — CEFAZOLIN SODIUM/WATER 2 G/20 ML
2 SYRINGE (ML) INTRAVENOUS SEE ADMIN INSTRUCTIONS
Status: CANCELLED | OUTPATIENT
Start: 2024-09-13

## 2024-09-11 RX ORDER — CEFAZOLIN SODIUM/WATER 2 G/20 ML
2 SYRINGE (ML) INTRAVENOUS
Status: CANCELLED | OUTPATIENT
Start: 2024-09-13

## 2024-09-11 NOTE — H&P (VIEW-ONLY)
HPI:  Mana Bull is a 53 year old female who was referred to me by Abimael Walker for evaluation of right upper quadrant abdominal pain.  This has been ongoing for the last several weeks.  Has been mostly constant.  She describes a sharp stabbing pain in her right upper quadrant that radiates around to her back.  She is feeling a bit better today.  There were initial concerns for shingles but ultimately she got an ultrasound that revealed chronic cholecystitis and a 1.3 cm gallbladder polyp versus a stone.    Allergies:Patient has no known allergies.    Past Medical History:   Diagnosis Date    Alcohol abuse     Diabetes (H)     Hypertension 04/12/2015    OCD (obsessive compulsive disorder)     mostly resolved.  Tactile symptoms as teenager.    PTSD (post-traumatic stress disorder)     Suspected COVID-19 virus infection 11/30/2020       Past Surgical History:   Procedure Laterality Date    EYE SURGERY     No prior abdominal surgeries    Current Outpatient Medications   Medication Sig Dispense Refill    aspirin 81 MG EC tablet Take 1 tablet (81 mg) by mouth daily 3 tablet     cyclobenzaprine (FLEXERIL) 5 MG tablet Take 1 tablet (5 mg) by mouth 3 times daily as needed for muscle spasms. 30 tablet 0    lidocaine (LIDODERM) 5 % patch Place 1 patch over 12 hours onto the skin every 24 hours. To prevent lidocaine toxicity, patient should be patch free for 12 hrs daily. 7 patch 1    pregabalin (LYRICA) 50 MG capsule Take 1 capsule (50 mg) by mouth 2 times daily. 60 capsule 1    rosuvastatin (CRESTOR) 10 MG tablet Take 1 tablet (10 mg) by mouth daily 90 tablet 1    valACYclovir (VALTREX) 1000 mg tablet Take 2 tablets (2,000 mg) by mouth 2 times daily 4 tablet 4       Family History   Problem Relation Age of Onset    Diabetes Mother     Hypertension Mother     Hyperlipidemia Mother     Cerebrovascular Disease Mother     Breast Cancer Mother         late 50s    Obesity Mother     Hypertension Father     Diabetes  "Father             No Known Problems Sister     No Known Problems Sister     Hyperlipidemia Maternal Grandmother             Hyperlipidemia Maternal Grandfather     Diabetes Maternal Grandfather             Hyperlipidemia Paternal Grandmother             Hyperlipidemia Paternal Grandfather             Thyroid Cancer No family hx of         reports that she quit smoking about 24 years ago. Her smoking use included cigarettes. She has been exposed to tobacco smoke. She has never used smokeless tobacco. She reports that she does not currently use alcohol. She reports that she does not use drugs.      BP (!) 167/101   Pulse 115   Ht 1.537 m (5' 0.5\")   Wt 92.1 kg (203 lb)   LMP  (LMP Unknown)   BMI 38.99 kg/m    Body mass index is 38.99 kg/m .    EXAM:  GENERAL: Well developed female in NAD  HEENT: Pupils are round and reactive, sclera are anicteric.   NECK:  No obvious masses or deformities  CV: RRR  PULM: Non-labored breathing on RA  ABDOMEN: Soft and nondistended.  Tender in the right upper quadrant.  NEURO: No obvious deficits noted.  EXT: No edema, no obvious deformities or any other abnormalities    LABS: Labs performed on 2024 unremarkable    IMAGES: Ultrasound personally reviewed.  IMPRESSION:  1.  Chronic cholecystitis.  2.  Possible 1.3 cm polyp or gallstone. Recommend surgical consultation.    Assessment/Plan:    Mana Bull is a 53 year old female presenting with ongoing right upper quadrant pain and imaging concerning for chronic cholecystitis and a 1.3 cm gallbladder polyp.  We discussed the pathophysiology of gallbladder disease.  I explained that her pain is in the right location although the pattern bit more protracted than I expect from a normal biliary issues.  Regardless I think this is likely her gallbladder that is contributing to the pain.  I think it is reasonable to pursue cholecystectomy.  We discussed the details this operation including " the risks of bleeding, infection, injury to bile ducts, bile leak, and retained stone.  I also explained that we could remove the gallbladder and she continues to have symptoms.  The patient understands and consents to the surgery.  We will schedule her convenience.         Darwin Rivera MD  General Surgeon  [facility]  Surgery [address] [address]?  Office: [phone]

## 2024-09-11 NOTE — PROGRESS NOTES
HPI:  Mana Bull is a 53 year old female who was referred to me by Abimael Walker for evaluation of right upper quadrant abdominal pain.  This has been ongoing for the last several weeks.  Has been mostly constant.  She describes a sharp stabbing pain in her right upper quadrant that radiates around to her back.  She is feeling a bit better today.  There were initial concerns for shingles but ultimately she got an ultrasound that revealed chronic cholecystitis and a 1.3 cm gallbladder polyp versus a stone.    Allergies:Patient has no known allergies.    Past Medical History:   Diagnosis Date    Alcohol abuse     Diabetes (H)     Hypertension 04/12/2015    OCD (obsessive compulsive disorder)     mostly resolved.  Tactile symptoms as teenager.    PTSD (post-traumatic stress disorder)     Suspected COVID-19 virus infection 11/30/2020       Past Surgical History:   Procedure Laterality Date    EYE SURGERY     No prior abdominal surgeries    Current Outpatient Medications   Medication Sig Dispense Refill    aspirin 81 MG EC tablet Take 1 tablet (81 mg) by mouth daily 3 tablet     cyclobenzaprine (FLEXERIL) 5 MG tablet Take 1 tablet (5 mg) by mouth 3 times daily as needed for muscle spasms. 30 tablet 0    lidocaine (LIDODERM) 5 % patch Place 1 patch over 12 hours onto the skin every 24 hours. To prevent lidocaine toxicity, patient should be patch free for 12 hrs daily. 7 patch 1    pregabalin (LYRICA) 50 MG capsule Take 1 capsule (50 mg) by mouth 2 times daily. 60 capsule 1    rosuvastatin (CRESTOR) 10 MG tablet Take 1 tablet (10 mg) by mouth daily 90 tablet 1    valACYclovir (VALTREX) 1000 mg tablet Take 2 tablets (2,000 mg) by mouth 2 times daily 4 tablet 4       Family History   Problem Relation Age of Onset    Diabetes Mother     Hypertension Mother     Hyperlipidemia Mother     Cerebrovascular Disease Mother     Breast Cancer Mother         late 50s    Obesity Mother     Hypertension Father     Diabetes  "Father             No Known Problems Sister     No Known Problems Sister     Hyperlipidemia Maternal Grandmother             Hyperlipidemia Maternal Grandfather     Diabetes Maternal Grandfather             Hyperlipidemia Paternal Grandmother             Hyperlipidemia Paternal Grandfather             Thyroid Cancer No family hx of         reports that she quit smoking about 24 years ago. Her smoking use included cigarettes. She has been exposed to tobacco smoke. She has never used smokeless tobacco. She reports that she does not currently use alcohol. She reports that she does not use drugs.      BP (!) 167/101   Pulse 115   Ht 1.537 m (5' 0.5\")   Wt 92.1 kg (203 lb)   LMP  (LMP Unknown)   BMI 38.99 kg/m    Body mass index is 38.99 kg/m .    EXAM:  GENERAL: Well developed female in NAD  HEENT: Pupils are round and reactive, sclera are anicteric.   NECK:  No obvious masses or deformities  CV: RRR  PULM: Non-labored breathing on RA  ABDOMEN: Soft and nondistended.  Tender in the right upper quadrant.  NEURO: No obvious deficits noted.  EXT: No edema, no obvious deformities or any other abnormalities    LABS: Labs performed on 2024 unremarkable    IMAGES: Ultrasound personally reviewed.  IMPRESSION:  1.  Chronic cholecystitis.  2.  Possible 1.3 cm polyp or gallstone. Recommend surgical consultation.    Assessment/Plan:    Mana Bull is a 53 year old female presenting with ongoing right upper quadrant pain and imaging concerning for chronic cholecystitis and a 1.3 cm gallbladder polyp.  We discussed the pathophysiology of gallbladder disease.  I explained that her pain is in the right location although the pattern bit more protracted than I expect from a normal biliary issues.  Regardless I think this is likely her gallbladder that is contributing to the pain.  I think it is reasonable to pursue cholecystectomy.  We discussed the details this operation including " the risks of bleeding, infection, injury to bile ducts, bile leak, and retained stone.  I also explained that we could remove the gallbladder and she continues to have symptoms.  The patient understands and consents to the surgery.  We will schedule her convenience.         Darwin Rivera MD  General Surgeon  [facility]  Surgery [address] [address]?  Office: [phone]

## 2024-09-11 NOTE — LETTER
9/11/2024      Mana Bull  [address] [address]      Dear Colleague,    Thank you for referring your patient, Mana Bull, to the [facility] [place]. Please see a copy of my visit note below.    HPI:  Mana Bull is a 53 year old female who was referred to me by Abimael Walker for evaluation of right upper quadrant abdominal pain.  This has been ongoing for the last several weeks.  Has been mostly constant.  She describes a sharp stabbing pain in her right upper quadrant that radiates around to her back.  She is feeling a bit better today.  There were initial concerns for shingles but ultimately she got an ultrasound that revealed chronic cholecystitis and a 1.3 cm gallbladder polyp versus a stone.    Allergies:Patient has no known allergies.    Past Medical History:   Diagnosis Date     Alcohol abuse      Diabetes (H)      Hypertension 04/12/2015     OCD (obsessive compulsive disorder)     mostly resolved.  Tactile symptoms as teenager.     PTSD (post-traumatic stress disorder)      Suspected COVID-19 virus infection 11/30/2020       Past Surgical History:   Procedure Laterality Date     EYE SURGERY     No prior abdominal surgeries    Current Outpatient Medications   Medication Sig Dispense Refill     aspirin 81 MG EC tablet Take 1 tablet (81 mg) by mouth daily 3 tablet      cyclobenzaprine (FLEXERIL) 5 MG tablet Take 1 tablet (5 mg) by mouth 3 times daily as needed for muscle spasms. 30 tablet 0     lidocaine (LIDODERM) 5 % patch Place 1 patch over 12 hours onto the skin every 24 hours. To prevent lidocaine toxicity, patient should be patch free for 12 hrs daily. 7 patch 1     pregabalin (LYRICA) 50 MG capsule Take 1 capsule (50 mg) by mouth 2 times daily. 60 capsule 1     rosuvastatin (CRESTOR) 10 MG tablet Take 1 tablet (10 mg) by mouth daily 90 tablet 1     valACYclovir (VALTREX) 1000 mg tablet Take 2 tablets (2,000 mg) by  "mouth 2 times daily 4 tablet 4       Family History   Problem Relation Age of Onset     Diabetes Mother      Hypertension Mother      Hyperlipidemia Mother      Cerebrovascular Disease Mother      Breast Cancer Mother         late 50s     Obesity Mother      Hypertension Father      Diabetes Father              No Known Problems Sister      No Known Problems Sister      Hyperlipidemia Maternal Grandmother              Hyperlipidemia Maternal Grandfather      Diabetes Maternal Grandfather              Hyperlipidemia Paternal Grandmother              Hyperlipidemia Paternal Grandfather              Thyroid Cancer No family hx of         reports that she quit smoking about 24 years ago. Her smoking use included cigarettes. She has been exposed to tobacco smoke. She has never used smokeless tobacco. She reports that she does not currently use alcohol. She reports that she does not use drugs.      BP (!) 167/101   Pulse 115   Ht 1.537 m (5' 0.5\")   Wt 92.1 kg (203 lb)   LMP  (LMP Unknown)   BMI 38.99 kg/m    Body mass index is 38.99 kg/m .    EXAM:  GENERAL: Well developed female in NAD  HEENT: Pupils are round and reactive, sclera are anicteric.   NECK:  No obvious masses or deformities  CV: RRR  PULM: Non-labored breathing on RA  ABDOMEN: Soft and nondistended.  Tender in the right upper quadrant.  NEURO: No obvious deficits noted.  EXT: No edema, no obvious deformities or any other abnormalities    LABS: Labs performed on 2024 unremarkable    IMAGES: Ultrasound personally reviewed.  IMPRESSION:  1.  Chronic cholecystitis.  2.  Possible 1.3 cm polyp or gallstone. Recommend surgical consultation.    Assessment/Plan:    Mana Bull is a 53 year old female presenting with ongoing right upper quadrant pain and imaging concerning for chronic cholecystitis and a 1.3 cm gallbladder polyp.  We discussed the pathophysiology of gallbladder disease.  I explained that her " pain is in the right location although the pattern bit more protracted than I expect from a normal biliary issues.  Regardless I think this is likely her gallbladder that is contributing to the pain.  I think it is reasonable to pursue cholecystectomy.  We discussed the details this operation including the risks of bleeding, infection, injury to bile ducts, bile leak, and retained stone.  I also explained that we could remove the gallbladder and she continues to have symptoms.  The patient understands and consents to the surgery.  We will schedule her convenience.         Darwin Rivera MD  General Surgeon  [facility]  Surgery [address] [address]?  Office: [phone]      Again, thank you for allowing me to participate in the care of your patient.        Sincerely,        Darwin Rivera MD

## 2024-09-13 ENCOUNTER — HOSPITAL ENCOUNTER (OUTPATIENT)
Facility: CLINIC | Age: 54
Discharge: HOME | End: 2024-09-13
Attending: SURGERY | Admitting: SURGERY
Payer: COMMERCIAL

## 2024-09-13 ENCOUNTER — ANESTHESIA EVENT (OUTPATIENT)
Dept: SURGERY | Facility: CLINIC | Age: 54
End: 2024-09-13
Payer: COMMERCIAL

## 2024-09-13 ENCOUNTER — ANESTHESIA (OUTPATIENT)
Dept: SURGERY | Facility: CLINIC | Age: 54
End: 2024-09-13
Payer: COMMERCIAL

## 2024-09-13 VITALS
HEART RATE: 85 BPM | OXYGEN SATURATION: 99 % | DIASTOLIC BLOOD PRESSURE: 77 MMHG | RESPIRATION RATE: 16 BRPM | SYSTOLIC BLOOD PRESSURE: 133 MMHG | BODY MASS INDEX: 33.82 KG/M2 | HEIGHT: 65 IN | TEMPERATURE: 97.7 F | WEIGHT: 203 LBS

## 2024-09-13 DIAGNOSIS — G89.18 ACUTE POST-OPERATIVE PAIN: Primary | ICD-10-CM

## 2024-09-13 LAB
GLUCOSE BLDC GLUCOMTR-MCNC: 141 MG/DL (ref 70–99)
GLUCOSE BLDC GLUCOMTR-MCNC: 157 MG/DL (ref 70–99)

## 2024-09-13 PROCEDURE — 370N000017 HC ANESTHESIA TECHNICAL FEE, PER MIN: Performed by: SURGERY

## 2024-09-13 PROCEDURE — 250N000011 HC RX IP 250 OP 636: Performed by: NURSE ANESTHETIST, CERTIFIED REGISTERED

## 2024-09-13 PROCEDURE — 258N000003 HC RX IP 258 OP 636: Performed by: ANESTHESIOLOGY

## 2024-09-13 PROCEDURE — 250N000009 HC RX 250: Performed by: NURSE ANESTHETIST, CERTIFIED REGISTERED

## 2024-09-13 PROCEDURE — 250N000011 HC RX IP 250 OP 636: Performed by: SURGERY

## 2024-09-13 PROCEDURE — 272N000001 HC OR GENERAL SUPPLY STERILE: Performed by: SURGERY

## 2024-09-13 PROCEDURE — 250N000011 HC RX IP 250 OP 636: Performed by: ANESTHESIOLOGY

## 2024-09-13 PROCEDURE — 82962 GLUCOSE BLOOD TEST: CPT

## 2024-09-13 PROCEDURE — 47562 LAPAROSCOPIC CHOLECYSTECTOMY: CPT | Performed by: SURGERY

## 2024-09-13 PROCEDURE — 250N000013 HC RX MED GY IP 250 OP 250 PS 637: Performed by: ANESTHESIOLOGY

## 2024-09-13 PROCEDURE — 250N000009 HC RX 250: Performed by: SURGERY

## 2024-09-13 PROCEDURE — 710N000010 HC RECOVERY PHASE 1, LEVEL 2, PER MIN: Performed by: SURGERY

## 2024-09-13 PROCEDURE — 88304 TISSUE EXAM BY PATHOLOGIST: CPT | Mod: TC | Performed by: SURGERY

## 2024-09-13 PROCEDURE — 250N000025 HC SEVOFLURANE, PER MIN: Performed by: SURGERY

## 2024-09-13 PROCEDURE — 710N000012 HC RECOVERY PHASE 2, PER MINUTE: Performed by: SURGERY

## 2024-09-13 PROCEDURE — 999N000141 HC STATISTIC PRE-PROCEDURE NURSING ASSESSMENT: Performed by: SURGERY

## 2024-09-13 PROCEDURE — S2900 ROBOTIC SURGICAL SYSTEM: HCPCS | Performed by: SURGERY

## 2024-09-13 PROCEDURE — 360N000080 HC SURGERY LEVEL 7, PER MIN: Performed by: SURGERY

## 2024-09-13 RX ORDER — NALOXONE HYDROCHLORIDE 0.4 MG/ML
0.1 INJECTION, SOLUTION INTRAMUSCULAR; INTRAVENOUS; SUBCUTANEOUS
Status: DISCONTINUED | OUTPATIENT
Start: 2024-09-13 | End: 2024-09-13 | Stop reason: HOSPADM

## 2024-09-13 RX ORDER — SODIUM CHLORIDE, SODIUM LACTATE, POTASSIUM CHLORIDE, CALCIUM CHLORIDE 600; 310; 30; 20 MG/100ML; MG/100ML; MG/100ML; MG/100ML
INJECTION, SOLUTION INTRAVENOUS CONTINUOUS
Status: DISCONTINUED | OUTPATIENT
Start: 2024-09-13 | End: 2024-09-13 | Stop reason: HOSPADM

## 2024-09-13 RX ORDER — FENTANYL CITRATE 50 UG/ML
25 INJECTION, SOLUTION INTRAMUSCULAR; INTRAVENOUS EVERY 5 MIN PRN
Status: DISCONTINUED | OUTPATIENT
Start: 2024-09-13 | End: 2024-09-13 | Stop reason: HOSPADM

## 2024-09-13 RX ORDER — ONDANSETRON 4 MG/1
4 TABLET, ORALLY DISINTEGRATING ORAL EVERY 30 MIN PRN
Status: DISCONTINUED | OUTPATIENT
Start: 2024-09-13 | End: 2024-09-13 | Stop reason: HOSPADM

## 2024-09-13 RX ORDER — CEFAZOLIN SODIUM/WATER 2 G/20 ML
2 SYRINGE (ML) INTRAVENOUS SEE ADMIN INSTRUCTIONS
Status: DISCONTINUED | OUTPATIENT
Start: 2024-09-13 | End: 2024-09-13 | Stop reason: HOSPADM

## 2024-09-13 RX ORDER — LIDOCAINE HYDROCHLORIDE 10 MG/ML
INJECTION, SOLUTION INFILTRATION; PERINEURAL PRN
Status: DISCONTINUED | OUTPATIENT
Start: 2024-09-13 | End: 2024-09-13

## 2024-09-13 RX ORDER — DEXAMETHASONE SODIUM PHOSPHATE 10 MG/ML
4 INJECTION, SOLUTION INTRAMUSCULAR; INTRAVENOUS
Status: DISCONTINUED | OUTPATIENT
Start: 2024-09-13 | End: 2024-09-13 | Stop reason: HOSPADM

## 2024-09-13 RX ORDER — ACETAMINOPHEN 325 MG/1
975 TABLET ORAL ONCE
Status: COMPLETED | OUTPATIENT
Start: 2024-09-13 | End: 2024-09-13

## 2024-09-13 RX ORDER — PROPOFOL 10 MG/ML
INJECTION, EMULSION INTRAVENOUS PRN
Status: DISCONTINUED | OUTPATIENT
Start: 2024-09-13 | End: 2024-09-13

## 2024-09-13 RX ORDER — DEXAMETHASONE SODIUM PHOSPHATE 4 MG/ML
4 INJECTION, SOLUTION INTRA-ARTICULAR; INTRALESIONAL; INTRAMUSCULAR; INTRAVENOUS; SOFT TISSUE
Status: DISCONTINUED | OUTPATIENT
Start: 2024-09-13 | End: 2024-09-13 | Stop reason: HOSPADM

## 2024-09-13 RX ORDER — ONDANSETRON 2 MG/ML
4 INJECTION INTRAMUSCULAR; INTRAVENOUS EVERY 30 MIN PRN
Status: DISCONTINUED | OUTPATIENT
Start: 2024-09-13 | End: 2024-09-13 | Stop reason: HOSPADM

## 2024-09-13 RX ORDER — FENTANYL CITRATE 50 UG/ML
50 INJECTION, SOLUTION INTRAMUSCULAR; INTRAVENOUS EVERY 5 MIN PRN
Status: DISCONTINUED | OUTPATIENT
Start: 2024-09-13 | End: 2024-09-13 | Stop reason: HOSPADM

## 2024-09-13 RX ORDER — LABETALOL HYDROCHLORIDE 5 MG/ML
INJECTION, SOLUTION INTRAVENOUS PRN
Status: DISCONTINUED | OUTPATIENT
Start: 2024-09-13 | End: 2024-09-13

## 2024-09-13 RX ORDER — MAGNESIUM SULFATE 4 G/50ML
4 INJECTION INTRAVENOUS ONCE
Status: COMPLETED | OUTPATIENT
Start: 2024-09-13 | End: 2024-09-13

## 2024-09-13 RX ORDER — HYDROMORPHONE HCL IN WATER/PF 6 MG/30 ML
0.2 PATIENT CONTROLLED ANALGESIA SYRINGE INTRAVENOUS EVERY 5 MIN PRN
Status: DISCONTINUED | OUTPATIENT
Start: 2024-09-13 | End: 2024-09-13 | Stop reason: HOSPADM

## 2024-09-13 RX ORDER — OXYCODONE HYDROCHLORIDE 5 MG/1
10 TABLET ORAL
Status: DISCONTINUED | OUTPATIENT
Start: 2024-09-13 | End: 2024-09-13 | Stop reason: HOSPADM

## 2024-09-13 RX ORDER — INDOCYANINE GREEN AND WATER 25 MG
2.5 KIT INJECTION ONCE
Status: COMPLETED | OUTPATIENT
Start: 2024-09-13 | End: 2024-09-13

## 2024-09-13 RX ORDER — LIDOCAINE 40 MG/G
CREAM TOPICAL
Status: DISCONTINUED | OUTPATIENT
Start: 2024-09-13 | End: 2024-09-13 | Stop reason: HOSPADM

## 2024-09-13 RX ORDER — LIDOCAINE HYDROCHLORIDE AND EPINEPHRINE 10; 10 MG/ML; UG/ML
INJECTION, SOLUTION INFILTRATION; PERINEURAL PRN
Status: DISCONTINUED | OUTPATIENT
Start: 2024-09-13 | End: 2024-09-13 | Stop reason: HOSPADM

## 2024-09-13 RX ORDER — ONDANSETRON 2 MG/ML
INJECTION INTRAMUSCULAR; INTRAVENOUS PRN
Status: DISCONTINUED | OUTPATIENT
Start: 2024-09-13 | End: 2024-09-13

## 2024-09-13 RX ORDER — PROPOFOL 10 MG/ML
INJECTION, EMULSION INTRAVENOUS CONTINUOUS PRN
Status: DISCONTINUED | OUTPATIENT
Start: 2024-09-13 | End: 2024-09-13

## 2024-09-13 RX ORDER — CEFAZOLIN SODIUM/WATER 2 G/20 ML
2 SYRINGE (ML) INTRAVENOUS
Status: COMPLETED | OUTPATIENT
Start: 2024-09-13 | End: 2024-09-13

## 2024-09-13 RX ORDER — LIDOCAINE HYDROCHLORIDE AND EPINEPHRINE 10; 10 MG/ML; UG/ML
INJECTION, SOLUTION INFILTRATION; PERINEURAL
Status: DISCONTINUED
Start: 2024-09-13 | End: 2024-09-13 | Stop reason: HOSPADM

## 2024-09-13 RX ORDER — DEXAMETHASONE SODIUM PHOSPHATE 4 MG/ML
INJECTION, SOLUTION INTRA-ARTICULAR; INTRALESIONAL; INTRAMUSCULAR; INTRAVENOUS; SOFT TISSUE PRN
Status: DISCONTINUED | OUTPATIENT
Start: 2024-09-13 | End: 2024-09-13

## 2024-09-13 RX ORDER — KETOROLAC TROMETHAMINE 30 MG/ML
INJECTION, SOLUTION INTRAMUSCULAR; INTRAVENOUS PRN
Status: DISCONTINUED | OUTPATIENT
Start: 2024-09-13 | End: 2024-09-13

## 2024-09-13 RX ORDER — HYDROMORPHONE HCL IN WATER/PF 6 MG/30 ML
0.4 PATIENT CONTROLLED ANALGESIA SYRINGE INTRAVENOUS EVERY 5 MIN PRN
Status: DISCONTINUED | OUTPATIENT
Start: 2024-09-13 | End: 2024-09-13 | Stop reason: HOSPADM

## 2024-09-13 RX ORDER — LIDOCAINE 50 MG/G
PATCH TOPICAL DAILY PRN
COMMUNITY

## 2024-09-13 RX ORDER — OXYCODONE HYDROCHLORIDE 5 MG/1
5 TABLET ORAL
Status: COMPLETED | OUTPATIENT
Start: 2024-09-13 | End: 2024-09-13

## 2024-09-13 RX ORDER — OXYCODONE HYDROCHLORIDE 5 MG/1
5 TABLET ORAL EVERY 4 HOURS PRN
Qty: 6 TABLET | Refills: 0 | Status: SHIPPED | OUTPATIENT
Start: 2024-09-13

## 2024-09-13 RX ORDER — VALACYCLOVIR HYDROCHLORIDE 1 G/1
2000 TABLET, FILM COATED ORAL 2 TIMES DAILY PRN
COMMUNITY

## 2024-09-13 RX ORDER — LABETALOL HYDROCHLORIDE 5 MG/ML
10 INJECTION, SOLUTION INTRAVENOUS
Status: DISCONTINUED | OUTPATIENT
Start: 2024-09-13 | End: 2024-09-13 | Stop reason: HOSPADM

## 2024-09-13 RX ORDER — FENTANYL CITRATE 50 UG/ML
INJECTION, SOLUTION INTRAMUSCULAR; INTRAVENOUS PRN
Status: DISCONTINUED | OUTPATIENT
Start: 2024-09-13 | End: 2024-09-13

## 2024-09-13 RX ADMIN — ROCURONIUM BROMIDE 20 MG: 10 INJECTION, SOLUTION INTRAVENOUS at 09:03

## 2024-09-13 RX ADMIN — ONDANSETRON 4 MG: 2 INJECTION INTRAMUSCULAR; INTRAVENOUS at 09:33

## 2024-09-13 RX ADMIN — ROCURONIUM BROMIDE 50 MG: 10 INJECTION, SOLUTION INTRAVENOUS at 08:39

## 2024-09-13 RX ADMIN — HYDROMORPHONE HYDROCHLORIDE 0.5 MG: 1 INJECTION, SOLUTION INTRAMUSCULAR; INTRAVENOUS; SUBCUTANEOUS at 09:13

## 2024-09-13 RX ADMIN — LIDOCAINE HYDROCHLORIDE 5 ML: 10 INJECTION, SOLUTION INFILTRATION; PERINEURAL at 08:39

## 2024-09-13 RX ADMIN — Medication 2 G: at 08:32

## 2024-09-13 RX ADMIN — MAGNESIUM SULFATE HEPTAHYDRATE 4 G: 80 INJECTION, SOLUTION INTRAVENOUS at 08:55

## 2024-09-13 RX ADMIN — LABETALOL HYDROCHLORIDE 5 MG: 5 INJECTION, SOLUTION INTRAVENOUS at 09:26

## 2024-09-13 RX ADMIN — Medication 2.5 MG: at 08:17

## 2024-09-13 RX ADMIN — KETOROLAC TROMETHAMINE 15 MG: 30 INJECTION, SOLUTION INTRAMUSCULAR at 09:39

## 2024-09-13 RX ADMIN — SUGAMMADEX 200 MG: 100 INJECTION, SOLUTION INTRAVENOUS at 09:45

## 2024-09-13 RX ADMIN — OXYCODONE HYDROCHLORIDE 5 MG: 5 TABLET ORAL at 10:59

## 2024-09-13 RX ADMIN — SODIUM CHLORIDE, POTASSIUM CHLORIDE, SODIUM LACTATE AND CALCIUM CHLORIDE: 600; 310; 30; 20 INJECTION, SOLUTION INTRAVENOUS at 08:17

## 2024-09-13 RX ADMIN — DEXAMETHASONE SODIUM PHOSPHATE 4 MG: 4 INJECTION, SOLUTION INTRA-ARTICULAR; INTRALESIONAL; INTRAMUSCULAR; INTRAVENOUS; SOFT TISSUE at 08:55

## 2024-09-13 RX ADMIN — HYDROMORPHONE HYDROCHLORIDE 0.5 MG: 1 INJECTION, SOLUTION INTRAMUSCULAR; INTRAVENOUS; SUBCUTANEOUS at 09:07

## 2024-09-13 RX ADMIN — MIDAZOLAM 2 MG: 1 INJECTION INTRAMUSCULAR; INTRAVENOUS at 08:32

## 2024-09-13 RX ADMIN — PROPOFOL 50 MCG/KG/MIN: 10 INJECTION, EMULSION INTRAVENOUS at 09:13

## 2024-09-13 RX ADMIN — FENTANYL CITRATE 50 MCG: 50 INJECTION, SOLUTION INTRAMUSCULAR; INTRAVENOUS at 10:22

## 2024-09-13 RX ADMIN — FENTANYL CITRATE 100 MCG: 50 INJECTION INTRAMUSCULAR; INTRAVENOUS at 08:39

## 2024-09-13 RX ADMIN — ACETAMINOPHEN 975 MG: 325 TABLET ORAL at 08:21

## 2024-09-13 RX ADMIN — FENTANYL CITRATE 50 MCG: 50 INJECTION, SOLUTION INTRAMUSCULAR; INTRAVENOUS at 10:15

## 2024-09-13 RX ADMIN — PROPOFOL 200 MG: 10 INJECTION, EMULSION INTRAVENOUS at 08:39

## 2024-09-13 ASSESSMENT — ACTIVITIES OF DAILY LIVING (ADL)
ADLS_ACUITY_SCORE: 18

## 2024-09-13 NOTE — ANESTHESIA PREPROCEDURE EVALUATION
Anesthesia Pre-Procedure Evaluation    Patient: Mana Bull   MRN: [id number] : 1970        Procedure : Procedure(s):  CHOLECYSTECTOMY, ROBOT-ASSISTED, LAPAROSCOPIC, USING DA JOSH XI          Past Medical History:   Diagnosis Date    Alcohol abuse     Diabetes (H)     Hypertension 2015    OCD (obsessive compulsive disorder)     mostly resolved.  Tactile symptoms as teenager.    PTSD (post-traumatic stress disorder)     Suspected COVID-19 virus infection 2020      Past Surgical History:   Procedure Laterality Date    EYE SURGERY        No Known Allergies   Social History     Tobacco Use    Smoking status: Former     Current packs/day: 0.00     Types: Cigarettes     Quit date: 2000     Years since quittin.2     Passive exposure: Past    Smokeless tobacco: Never   Substance Use Topics    Alcohol use: Not Currently     Comment: OCCASTIONAL      Wt Readings from Last 1 Encounters:   24 92.1 kg (203 lb)        Anesthesia Evaluation   Pt has had prior anesthetic.     No history of anesthetic complications       ROS/MED HX  ENT/Pulmonary:       Neurologic:       Cardiovascular:     (+)  hypertension- -   -  - -                                      METS/Exercise Tolerance:     Hematologic:       Musculoskeletal:       GI/Hepatic:       Renal/Genitourinary:       Endo:     (+)  type II DM,             Obesity,       Psychiatric/Substance Use:       Infectious Disease:       Malignancy:       Other:            Physical Exam    Airway  airway exam normal      Mallampati: II   TM distance: > 3 FB   Neck ROM: full   Mouth opening: > 3 cm    Respiratory Devices and Support         Dental  no notable dental history         Cardiovascular   cardiovascular exam normal       Rhythm and rate: regular and normal     Pulmonary   pulmonary exam normal        breath sounds clear to auscultation           OUTSIDE LABS:  CBC:   Lab Results   Component Value Date    WBC 5.1 12/15/2023    WBC 8.6  "12/22/2021    HGB 14.3 12/15/2023    HGB 14.0 12/22/2021    HCT 39.8 12/15/2023    HCT 39.3 12/22/2021     12/15/2023     12/22/2021     BMP:   Lab Results   Component Value Date     06/10/2024     12/15/2023    POTASSIUM 4.9 06/10/2024    POTASSIUM 4.0 12/15/2023    CHLORIDE 108 (H) 06/10/2024    CHLORIDE 102 12/15/2023    CO2 23 06/10/2024    CO2 22 12/15/2023    BUN 6.1 06/10/2024    BUN 7.4 12/15/2023    CR 1.06 (H) 06/10/2024    CR 0.76 12/15/2023     (H) 06/10/2024    GLC 96 12/15/2023     COAGS: No results found for: \"PTT\", \"INR\", \"FIBR\"  POC:   Lab Results   Component Value Date    HCG Negative 10/01/2002     HEPATIC:   Lab Results   Component Value Date    ALBUMIN 3.7 12/22/2021    PROTTOTAL 7.0 12/22/2021    ALT 13 06/10/2024    AST 14 12/22/2021    ALKPHOS 104 12/22/2021    BILITOTAL 0.4 12/22/2021     OTHER:   Lab Results   Component Value Date    A1C 6.1 (H) 06/10/2024    NICKIE 9.7 06/10/2024    LIPASE 77 (H) 12/22/2021    AMYLASE 63 10/01/2002    TSH 1.43 06/03/2019       Anesthesia Plan    ASA Status:  2    NPO Status:  NPO Appropriate    Anesthesia Type: General.     - Airway: ETT   Induction: RSI.   Maintenance: Balanced.        Consents    Anesthesia Plan(s) and associated risks, benefits, and realistic alternatives discussed. Questions answered and patient/representative(s) expressed understanding.     - Discussed:     - Discussed with:  Patient      - Extended Intubation/Ventilatory Support Discussed: No.      - Patient is DNR/DNI Status: No     Use of blood products discussed: No .     Postoperative Care    Pain management: IV analgesics, Oral pain medications.   PONV prophylaxis: Ondansetron (or other 5HT-3), Dexamethasone or Solumedrol, Background Propofol Infusion     Comments:               Kumar Preciado MD    I have reviewed the pertinent notes and labs in the chart from the past 30 days and (re)examined the patient.  Any updates or changes from those notes " "are reflected in this note.             # Drug Induced Platelet Defect: home medication list includes an antiplatelet medication  # Obesity: Estimated body mass index is 38.99 kg/m  as calculated from the following:    Height as of 9/11/24: 1.537 m (5' 0.5\").    Weight as of 9/11/24: 92.1 kg (203 lb).      "

## 2024-09-13 NOTE — PHARMACY-ADMISSION MEDICATION HISTORY
Pharmacist Admission Medication History    Admission medication history is complete. The information provided in this note is only as accurate as the sources available at the time of the update.    Information Source(s): Patient and CareEverywhere/SureScripts via in-person    Pertinent Information:  Allergies reviewed with patient and updates made in EHR: no    Medication History Completed By: Aurora Frausto RPH 9/13/2024 8:36 AM    PTA Med List   Medication Sig Last Dose    cyclobenzaprine (FLEXERIL) 5 MG tablet Take 1 tablet (5 mg) by mouth 3 times daily as needed for muscle spasms.     lidocaine (LIDODERM) 5 % patch Place onto the skin daily as needed for moderate pain. To prevent lidocaine toxicity, patient should be patch free for 12 hrs daily. Past Week at Not currently on    pregabalin (LYRICA) 50 MG capsule Take 1 capsule (50 mg) by mouth 2 times daily. 9/11/2024    rosuvastatin (CRESTOR) 10 MG tablet Take 1 tablet (10 mg) by mouth daily ~ 1 month ago    valACYclovir (VALTREX) 1000 mg tablet Take 2,000 mg by mouth 2 times daily as needed. X 2 doses Unknown

## 2024-09-13 NOTE — ANESTHESIA CARE TRANSFER NOTE
Patient: Mana Bull    Procedure: Procedure(s):  CHOLECYSTECTOMY, ROBOT-ASSISTED, LAPAROSCOPIC, USING DA JOSH XI       Diagnosis: Chronic cholecystitis [K81.1]  Gallbladder polyp [K82.4]  Diagnosis Additional Information: No value filed.    Anesthesia Type:   General     Note:    Oropharynx: oropharynx clear of all foreign objects and spontaneously breathing  Level of Consciousness: awake and drowsy  Oxygen Supplementation: face mask  Level of Supplemental Oxygen (L/min / FiO2): 6  Independent Airway: airway patency satisfactory and stable  Dentition: dentition unchanged  Vital Signs Stable: post-procedure vital signs reviewed and stable  Report to RN Given: handoff report given  Patient transferred to: PACU    Handoff Report: Identifed the Patient, Identified the Reponsible Provider, Reviewed the pertinent medical history, Discussed the surgical course, Reviewed Intra-OP anesthesia mangement and issues during anesthesia, Set expectations for post-procedure period and Allowed opportunity for questions and acknowledgement of understanding      Vitals:  Vitals Value Taken Time   /72 09/13/24 0958   Temp 36.4  C (97.5  F) 09/13/24 0956   Pulse 58 09/13/24 0957   Resp 15 09/13/24 0955   SpO2 96 % 09/13/24 0957   Vitals shown include unfiled device data.    Electronically Signed By: DAVON Kidd CRNA  September 13, 2024  9:59 AM

## 2024-09-13 NOTE — INTERVAL H&P NOTE
"I have reviewed the surgical (or preoperative) H&P that is linked to this encounter, and examined the patient. There are no significant changes.    EXAM  /83   Temp 98.2  F (36.8  C) (Core)   Resp 16   LMP  (LMP Unknown)   SpO2 100%   Laying in bed in NAD  Sclerae anicteric  RRR  Lungs clear to auscultation bilaterally  Abdomen non-distended    Clinical Conditions Present on Arrival:  Clinically Significant Risk Factors Present on Admission                 # Drug Induced Platelet Defect: home medication list includes an antiplatelet medication      # Obesity: Estimated body mass index is 38.99 kg/m  as calculated from the following:    Height as of 9/11/24: 1.537 m (5' 0.5\").    Weight as of 9/11/24: 92.1 kg (203 lb).       "

## 2024-09-13 NOTE — OP NOTE
General Surgery Operative Note    Date of Surgery: 09/13/24      Surgeon: Darwin Rivera MD    Assistant: None    Preoperative Diagnosis: Biliary Colic    Postoperative Diagnosis: Biliary colic    Procedure: Robot assisted cholecystectomy    EBL: 5 cc    Findings: Chornically inflamed gallbladder    Indications:  Patient is a 53-year-old woman who came to my clinic this week complaining of right upper quadrant pain.  Her workup revealed chronic cholecystitis and a large stone versus polyp in the neck of the gallbladder.  After discussion of the risks and benefits of cholecystectomy, the patient consented the procedure.    Details of operation:  Patient was brought to the operating room laid on the table in the supine position.  General anesthesia was induced without incident.  The patient was prepped and draped in usual sterile fashion.  A timeout for safety was performed.    I began with left upper quadrant incision and a Veress needle was introduced.  Pneumoperitoneum was established without incident.  I placed an 8 mm robotic port and there was no injury with entry.  I placed 3 additional robotic ports in the mid abdomen.  The patient was placed in reverse Trendelenburg position and the robot docked.    I began by identifying the gallbladder into the liver.  There was clearly chronic inflammation.  It was grasped retracted cephalad.  She had a lot of fat around the infundibulum that was carefully dissected away.  I clearly identified the cystic artery and continued my dissection to clear out the hepatocystic triangle.  The cystic duct was identified and the critical view of safety obtained.  I divided the cystic artery.  I cleared off the cystic duct a bit more and clipped and divided that as well.  There was a posterior branch of the cystic artery that was divided.  I then remove the gallbladder from the liver bed using electrocautery.  The gallbladder was placed in Endo Catch bag.  Hemostasis was achieved.   The robot was undocked.  The gallbladder was removed from the left midclavicular line port site.  This was then closed with an 0 Vicryl suture on a suture passer device.  The abdomen is desufflated and all the ports removed.  Skin was closed with 4 Monocryl and Dermabond as a dressing.  The patient tolerated the procedure well.  There were no immediately apparent complications.    Darwin Rivera MD  General Surgeon  [facility]  Surgery [address] [address]?  Office: [phone]

## 2024-09-13 NOTE — ANESTHESIA PROCEDURE NOTES
Airway       Patient location during procedure: OR       Procedure Start/Stop Times: 9/13/2024 8:42 AM  Staff -        Anesthesiologist:  Kumar Preciado MD       CRNA: Cheryl Blanco APRN CRNA       Performed By: CRNA  Consent for Airway        Urgency: elective  Indications and Patient Condition       Indications for airway management: amanda-procedural       Induction type:intravenous       Mask difficulty assessment: 2 - vent by mask + OA or adjuvant +/- NMBA    Final Airway Details       Final airway type: endotracheal airway       Successful airway: ETT - single  Endotracheal Airway Details        ETT size (mm): 7.0       Cuffed: yes       Successful intubation technique: direct laryngoscopy       DL Blade Type: Giraldo 2       Grade View of Cords: 1       Adjucts: stylet       Position: Right       Measured from: gums/teeth       Secured at (cm): 21       Bite block used: None    Post intubation assessment        Placement verified by: capnometry, equal breath sounds and chest rise        Number of attempts at approach: 1       Secured with: tape       Ease of procedure: easy       Dentition: Intact and Unchanged       Dental guard used and removed. Dental Guard Type: Standard White.    Medication(s) Administered   Medication Administration Time: 9/13/2024 8:42 AM

## 2024-09-13 NOTE — ANESTHESIA POSTPROCEDURE EVALUATION
Patient: Mana Bull    Procedure: Procedure(s):  CHOLECYSTECTOMY, ROBOT-ASSISTED, LAPAROSCOPIC, USING DA JOSH XI       Anesthesia Type:  General    Note:  Disposition: Outpatient   Postop Pain Control: Uneventful            Sign Out: Well controlled pain   PONV: No   Neuro/Psych: Uneventful            Sign Out: Acceptable/Baseline neuro status   Airway/Respiratory: Uneventful            Sign Out: Acceptable/Baseline resp. status   CV/Hemodynamics: Uneventful            Sign Out: Acceptable CV status; No obvious hypovolemia; No obvious fluid overload   Other NRE: NONE   DID A NON-ROUTINE EVENT OCCUR? No           Last vitals:  Vitals Value Taken Time   /82 09/13/24 1030   Temp 36.7  C (98.1  F) 09/13/24 1030   Pulse 84 09/13/24 1030   Resp 16 09/13/24 1030   SpO2 99 % 09/13/24 1030       Electronically Signed By: Kumar Preciado MD  September 13, 2024  2:27 PM

## 2024-09-16 ENCOUNTER — MYC MEDICAL ADVICE (OUTPATIENT)
Dept: FAMILY MEDICINE | Facility: CLINIC | Age: 54
End: 2024-09-16
Payer: COMMERCIAL

## 2024-09-16 LAB
PATH REPORT.COMMENTS IMP SPEC: NORMAL
PATH REPORT.COMMENTS IMP SPEC: NORMAL
PATH REPORT.FINAL DX SPEC: NORMAL
PATH REPORT.GROSS SPEC: NORMAL
PATH REPORT.MICROSCOPIC SPEC OTHER STN: NORMAL
PATH REPORT.RELEVANT HX SPEC: NORMAL
PHOTO IMAGE: NORMAL

## 2024-09-16 PROCEDURE — 88304 TISSUE EXAM BY PATHOLOGIST: CPT | Mod: 26 | Performed by: PATHOLOGY

## 2024-09-20 ENCOUNTER — TELEPHONE (OUTPATIENT)
Dept: FAMILY MEDICINE | Facility: CLINIC | Age: 54
End: 2024-09-20

## 2024-09-20 NOTE — LETTER
Mana Bull     [address] [address]           10/14/2024    Dear Mana,     In reviewing your records, we have determined a gap in your preventive services. Based on your age and health history, we recommend the follow service.       Diabetic check      If you have had the service elsewhere, please contact us so we can update our records. Please let us know if you have transferred your care to another clinic.    Please call [phone] to schedule this appointment.    We believe that a strong preventive care program, including regular physicals and follow-up care is an important part of a healthy lifestyle and we are committed to helping you maintain your health.    Thank you for choosing us as your health care provider.    Sincerely,     [facility]

## 2024-09-20 NOTE — TELEPHONE ENCOUNTER
Patient Quality Outreach    Patient is due for the following:   Diabetes -  A1C, Eye Exam, and Foot Exam    Next Steps:   Schedule a office visit for Diabetes check    Type of outreach:    Sent Pocket[employer] message.      Questions for provider review:    None           Jae Blanco MA  Chart routed to Care Team.

## 2024-09-23 ENCOUNTER — HOSPITAL ENCOUNTER (EMERGENCY)
Facility: CLINIC | Age: 54
Discharge: HOME | End: 2024-09-23
Admitting: EMERGENCY MEDICINE
Payer: COMMERCIAL

## 2024-09-23 ENCOUNTER — APPOINTMENT (OUTPATIENT)
Dept: CT IMAGING | Facility: CLINIC | Age: 54
End: 2024-09-23
Attending: EMERGENCY MEDICINE
Payer: COMMERCIAL

## 2024-09-23 VITALS
BODY MASS INDEX: 33.78 KG/M2 | RESPIRATION RATE: 22 BRPM | DIASTOLIC BLOOD PRESSURE: 76 MMHG | OXYGEN SATURATION: 96 % | SYSTOLIC BLOOD PRESSURE: 123 MMHG | HEART RATE: 74 BPM | WEIGHT: 203 LBS | TEMPERATURE: 97.9 F

## 2024-09-23 DIAGNOSIS — G89.18 POST-OP PAIN: ICD-10-CM

## 2024-09-23 LAB
ALBUMIN SERPL BCG-MCNC: 4.1 G/DL (ref 3.5–5.2)
ALBUMIN UR-MCNC: NEGATIVE MG/DL
ALP SERPL-CCNC: 120 U/L (ref 40–150)
ALT SERPL W P-5'-P-CCNC: 13 U/L (ref 0–50)
ANION GAP SERPL CALCULATED.3IONS-SCNC: 15 MMOL/L (ref 7–15)
APPEARANCE UR: CLEAR
AST SERPL W P-5'-P-CCNC: 19 U/L (ref 0–45)
ATRIAL RATE - MUSE: 72 BPM
BASOPHILS # BLD AUTO: 0 10E3/UL (ref 0–0.2)
BASOPHILS NFR BLD AUTO: 1 %
BILIRUB SERPL-MCNC: 0.4 MG/DL
BILIRUB UR QL STRIP: NEGATIVE
BUN SERPL-MCNC: 7.3 MG/DL (ref 6–20)
CALCIUM SERPL-MCNC: 8.8 MG/DL (ref 8.8–10.4)
CHLORIDE SERPL-SCNC: 105 MMOL/L (ref 98–107)
COLOR UR AUTO: YELLOW
CREAT SERPL-MCNC: 0.63 MG/DL (ref 0.51–0.95)
DIASTOLIC BLOOD PRESSURE - MUSE: 81 MMHG
EGFRCR SERPLBLD CKD-EPI 2021: >90 ML/MIN/1.73M2
EOSINOPHIL # BLD AUTO: 0.2 10E3/UL (ref 0–0.7)
EOSINOPHIL NFR BLD AUTO: 2 %
ERYTHROCYTE [DISTWIDTH] IN BLOOD BY AUTOMATED COUNT: 14 % (ref 10–15)
GLUCOSE SERPL-MCNC: 181 MG/DL (ref 70–99)
GLUCOSE UR STRIP-MCNC: NEGATIVE MG/DL
HCO3 SERPL-SCNC: 19 MMOL/L (ref 22–29)
HCT VFR BLD AUTO: 40.9 % (ref 35–47)
HGB BLD-MCNC: 14.7 G/DL (ref 11.7–15.7)
HGB UR QL STRIP: NEGATIVE
HOLD SPECIMEN: NORMAL
HOLD SPECIMEN: NORMAL
IMM GRANULOCYTES # BLD: 0.1 10E3/UL
IMM GRANULOCYTES NFR BLD: 1 %
INTERPRETATION ECG - MUSE: NORMAL
KETONES UR STRIP-MCNC: NEGATIVE MG/DL
LEUKOCYTE ESTERASE UR QL STRIP: NEGATIVE
LIPASE SERPL-CCNC: 36 U/L (ref 13–60)
LYMPHOCYTES # BLD AUTO: 2.3 10E3/UL (ref 0.8–5.3)
LYMPHOCYTES NFR BLD AUTO: 28 %
MCH RBC QN AUTO: 28.5 PG (ref 26.5–33)
MCHC RBC AUTO-ENTMCNC: 35.9 G/DL (ref 31.5–36.5)
MCV RBC AUTO: 79 FL (ref 78–100)
MONOCYTES # BLD AUTO: 0.7 10E3/UL (ref 0–1.3)
MONOCYTES NFR BLD AUTO: 8 %
MUCOUS THREADS #/AREA URNS LPF: PRESENT /LPF
NEUTROPHILS # BLD AUTO: 4.8 10E3/UL (ref 1.6–8.3)
NEUTROPHILS NFR BLD AUTO: 60 %
NITRATE UR QL: NEGATIVE
NRBC # BLD AUTO: 0 10E3/UL
NRBC BLD AUTO-RTO: 0 /100
P AXIS - MUSE: 58 DEGREES
PH UR STRIP: 7 [PH] (ref 5–7)
PLATELET # BLD AUTO: 271 10E3/UL (ref 150–450)
POTASSIUM SERPL-SCNC: 4.3 MMOL/L (ref 3.4–5.3)
PR INTERVAL - MUSE: 156 MS
PROT SERPL-MCNC: 6.7 G/DL (ref 6.4–8.3)
QRS DURATION - MUSE: 66 MS
QT - MUSE: 406 MS
QTC - MUSE: 444 MS
R AXIS - MUSE: 45 DEGREES
RBC # BLD AUTO: 5.16 10E6/UL (ref 3.8–5.2)
RBC URINE: 0 /HPF
SODIUM SERPL-SCNC: 139 MMOL/L (ref 135–145)
SP GR UR STRIP: 1.01 (ref 1–1.03)
SQUAMOUS EPITHELIAL: 1 /HPF
SYSTOLIC BLOOD PRESSURE - MUSE: 123 MMHG
T AXIS - MUSE: 52 DEGREES
TROPONIN T SERPL HS-MCNC: 8 NG/L
UROBILINOGEN UR STRIP-MCNC: <2 MG/DL
VENTRICULAR RATE- MUSE: 72 BPM
WBC # BLD AUTO: 8 10E3/UL (ref 4–11)
WBC URINE: 1 /HPF

## 2024-09-23 PROCEDURE — 81003 URINALYSIS AUTO W/O SCOPE: CPT | Performed by: EMERGENCY MEDICINE

## 2024-09-23 PROCEDURE — 84155 ASSAY OF PROTEIN SERUM: CPT | Performed by: EMERGENCY MEDICINE

## 2024-09-23 PROCEDURE — 85025 COMPLETE CBC W/AUTO DIFF WBC: CPT | Performed by: EMERGENCY MEDICINE

## 2024-09-23 PROCEDURE — 74177 CT ABD & PELVIS W/CONTRAST: CPT

## 2024-09-23 PROCEDURE — 250N000011 HC RX IP 250 OP 636: Performed by: EMERGENCY MEDICINE

## 2024-09-23 PROCEDURE — 83690 ASSAY OF LIPASE: CPT | Performed by: EMERGENCY MEDICINE

## 2024-09-23 PROCEDURE — 96374 THER/PROPH/DIAG INJ IV PUSH: CPT | Mod: 59

## 2024-09-23 PROCEDURE — 84484 ASSAY OF TROPONIN QUANT: CPT | Performed by: EMERGENCY MEDICINE

## 2024-09-23 PROCEDURE — 36415 COLL VENOUS BLD VENIPUNCTURE: CPT | Performed by: EMERGENCY MEDICINE

## 2024-09-23 PROCEDURE — 99285 EMERGENCY DEPT VISIT HI MDM: CPT | Mod: 25

## 2024-09-23 PROCEDURE — 93005 ELECTROCARDIOGRAM TRACING: CPT | Performed by: EMERGENCY MEDICINE

## 2024-09-23 RX ORDER — OXYCODONE HYDROCHLORIDE 5 MG/1
5 TABLET ORAL EVERY 6 HOURS PRN
Qty: 5 TABLET | Refills: 0 | Status: SHIPPED | OUTPATIENT
Start: 2024-09-23 | End: 2024-09-26

## 2024-09-23 RX ORDER — IOPAMIDOL 755 MG/ML
90 INJECTION, SOLUTION INTRAVASCULAR ONCE
Status: COMPLETED | OUTPATIENT
Start: 2024-09-23 | End: 2024-09-23

## 2024-09-23 RX ORDER — KETOROLAC TROMETHAMINE 15 MG/ML
15 INJECTION, SOLUTION INTRAMUSCULAR; INTRAVENOUS ONCE
Status: COMPLETED | OUTPATIENT
Start: 2024-09-23 | End: 2024-09-23

## 2024-09-23 RX ADMIN — IOPAMIDOL 90 ML: 755 INJECTION, SOLUTION INTRAVENOUS at 13:28

## 2024-09-23 RX ADMIN — KETOROLAC TROMETHAMINE 15 MG: 15 INJECTION, SOLUTION INTRAMUSCULAR; INTRAVENOUS at 12:55

## 2024-09-23 ASSESSMENT — ACTIVITIES OF DAILY LIVING (ADL)
ADLS_ACUITY_SCORE: 35

## 2024-09-23 ASSESSMENT — COLUMBIA-SUICIDE SEVERITY RATING SCALE - C-SSRS
6. HAVE YOU EVER DONE ANYTHING, STARTED TO DO ANYTHING, OR PREPARED TO DO ANYTHING TO END YOUR LIFE?: NO
2. HAVE YOU ACTUALLY HAD ANY THOUGHTS OF KILLING YOURSELF IN THE PAST MONTH?: NO
1. IN THE PAST MONTH, HAVE YOU WISHED YOU WERE DEAD OR WISHED YOU COULD GO TO SLEEP AND NOT WAKE UP?: NO

## 2024-09-23 ASSESSMENT — ENCOUNTER SYMPTOMS
DIARRHEA: 0
COUGH: 0
CONSTIPATION: 0
ABDOMINAL PAIN: 1
NAUSEA: 0
VOMITING: 0
SHORTNESS OF BREATH: 0
CHILLS: 0
BLOOD IN STOOL: 0
FEVER: 0

## 2024-09-23 NOTE — DISCHARGE INSTRUCTIONS
You were seen here today for evaluation of abdominal pain. Your lab work today is reassuring without evidence of abnormal liver function, infections in the blood or urine, or heart problems. Your CT scan shows expected post-operative changes but nothing abnormal.     You may take Tylenol and ibuprofen for pain/fever, do not exceed 4000 mg of Tylenol per day or 3200 mg ofibuprofen per day. Take oxycodone for severe pain - This is a narcotic pain medication that might be habit forming so only take when necessary. Do not drink alcohol,drive, or operate machinery while taking this medication.     Follow up with your surgery team as planned. Return here for any new or worsening symptoms including severe pain, fever, vomiting, chest pain, shortness of breath, or any other symptoms that concern you.

## 2024-09-23 NOTE — ED PROVIDER NOTES
EMERGENCY DEPARTMENT ENCOUNTER      NAME: Mana Bull  AGE: 53 year old female  YOB: 1970  MRN: [id number]  EVALUATION DATE & TIME: 9/23/2024 12:25 PM    PCP: Abimael Walker    ED PROVIDER: Cherelle Gomez PA-C      Chief Complaint   Patient presents with    Back Pain    Abdominal Pain         FINAL IMPRESSION:  1. Post-op pain          ED COURSE & MEDICAL DECISION MAKING:    Pertinent Labs & Imaging studies reviewed. (See chart for details)    53 year old female presents to the Emergency Department for evaluation of abdominal pain.     Physical exam is remarkable for an uncomfortable appearing female, she flinches frequently in pain. Heart and lung sounds are clear diffusely throughout. She has RUQ tenderness to palpation, no rebound or guarding noted. Surgical incisions on abdominal wall appear to be healing well with no signs of infection. Vital signs are remarkable for mild hypertension, otherwise unremarkable and she is afebrile.     CBC is unremarkable with no leukocytosis or anemia.  CMP is unremarkable with no significant electrolyte derangements, normal liver and kidney function. Lipase within normal limits. Troponin is within female reference range at 8, EKG without ischemic changes. Urinalysis without evidence of blood or infection. CT of the abdomen shows mild post-operative stranding in the gallbladder fossa but largely unremarkable without evidence of infection or acute complications.     The patient was given toradol here with some improvement in her pain, I do not think any further emergent labs or imaging are indicated at this time. The patient is overall well appearing here and her workup today is reassuring. I discussed her presentation with general surgery and they have no further recommendations at this time. Low concern for atypical ACS with negative troponin with two days of constant symptoms and non-ischemic EKG. Low clinical concern for PE, she has no risk factors  and is not tachycardic or hypoxic; she denies any pleuritic pain or shortness of breath. Advised her to continue managing pain with tylenol and ibuprofen,  reviewed and I did provide a small amount of oxycodone for her to use as needed for severe pain. Recommend follow up with her surgical team as previously planned and return here for any new or worsening symptoms. The patient is agreeable with this treatment plan and verbalized understanding.     Medical Decision Making  Obtained supplemental history:Supplemental history obtained?: No  Reviewed external records: External records reviewed?: Inpatient Record: Op note from cholecystectomy 09/13/24.  Care impacted by chronic illness:Diabetes and Hypertension  Care significantly affected by social determinants of health:Access to Medical Care  Did you consider but not order tests?: In addition to work-up documented, I considered the following work up: ddimer or CTA but low clinical concern.  Did you interpret images independently?: Independent interpretation of ECG and images noted in documentation, when applicable.  Consultation discussion with other provider:Did you involve another provider (consultant, , pharmacy, etc.)?: I discussed the care with another health care provider, see documentation for details.  Discharge. I prescribed additional prescription strength medication(s) as charted. I considered admission, but ultimately discharged patient after reassuring labs and imaging.  Not Applicable      ED Course   12:42 PM Performed my initial history and physical exam. Discussed workup in the emergency department, management of symptoms, and likely disposition.   2:43 PM Discussed with Dr. Abdirahamn Rocha with general surgery.   3:12 PM I discussed the plan for discharge with the patient or family and they are agreeable.. We discussed supportive cares at home and reasons for return to the ER including new or worsening symptoms - all questions and concerns addressed.  Patient to be discharged by RN.    At the conclusion of the encounter I discussed the results of all of the tests and the disposition. The questions were answered. The patient or family acknowledged understanding and was agreeable with the care plan.     Voice recognition software was used in the creation of this note. Any grammatical or nonsensical errors are due to inherent errors with the software and are not the intention of the writer.     MEDICATIONS GIVEN IN THE EMERGENCY:  Medications   ketorolac (TORADOL) injection 15 mg (15 mg Intravenous $Given 9/23/24 1255)   iopamidol (ISOVUE-370) solution 90 mL (90 mLs Intravenous $Given 9/23/24 1328)       NEW PRESCRIPTIONS STARTED AT TODAY'S ER VISIT  New Prescriptions    OXYCODONE (ROXICODONE) 5 MG TABLET    Take 1 tablet (5 mg) by mouth every 6 hours as needed for pain.            =================================================================    HPI    Patient information was obtained from: Patient    Use of : N/A        Mana Bull is a 53 year old female with PMH of alcohol abuse, DM2, HTN who presents to the ED via walk-in for evaluation of right upper quadrant abdominal pain.     The patient reports that two days ago, she developed a constant, aching, stabbing pain in her right upper quadrant. She notes the pain feels similar to prior her cholecystectomy but had seemed largely resolved after the procedure until this new onset. The pain radiates to her right back. She is not able to identify any aggravating or alleviating factors. She has been taking tylenol and ibuprofen without much improvement in the pain. Last oral intake was this morning.     She denies fevers, chills, chest pain, sob, cough, nausea, vomiting, diarrhea, constipation, or black/bloody stools.      REVIEW OF SYSTEMS   Review of Systems   Constitutional:  Negative for chills and fever.   Respiratory:  Negative for cough and shortness of breath.    Cardiovascular:  Negative  for chest pain.   Gastrointestinal:  Positive for abdominal pain (RUQ pain). Negative for blood in stool, constipation, diarrhea, nausea and vomiting.       All other systems reviewed and are negative unless noted in HPI.      PAST MEDICAL HISTORY:  Past Medical History:   Diagnosis Date    Alcohol abuse     Diabetes (H)     Hypertension 2015    OCD (obsessive compulsive disorder)     mostly resolved.  Tactile symptoms as teenager.    PTSD (post-traumatic stress disorder)     Suspected COVID-19 virus infection 2020       PAST SURGICAL HISTORY:  Past Surgical History:   Procedure Laterality Date    EYE SURGERY      LAPAROSCOPIC CHOLECYSTECTOMY N/A 2024    Procedure: CHOLECYSTECTOMY, ROBOT-ASSISTED, LAPAROSCOPIC, USING DA JOSH XI;  Surgeon: Darwin Rivera MD;  Location: [facility] Main OR       CURRENT MEDICATIONS:    oxyCODONE (ROXICODONE) 5 MG tablet  cyclobenzaprine (FLEXERIL) 5 MG tablet  lidocaine (LIDODERM) 5 % patch  oxyCODONE (ROXICODONE) 5 MG tablet  pregabalin (LYRICA) 50 MG capsule  rosuvastatin (CRESTOR) 10 MG tablet  valACYclovir (VALTREX) 1000 mg tablet        ALLERGIES:  No Known Allergies    FAMILY HISTORY:  Family History   Problem Relation Age of Onset    Diabetes Mother     Hypertension Mother     Hyperlipidemia Mother     Cerebrovascular Disease Mother     Breast Cancer Mother         late 50s    Obesity Mother     Hypertension Father     Diabetes Father             No Known Problems Sister     No Known Problems Sister     Hyperlipidemia Maternal Grandmother             Hyperlipidemia Maternal Grandfather     Diabetes Maternal Grandfather             Hyperlipidemia Paternal Grandmother             Hyperlipidemia Paternal Grandfather             Thyroid Cancer No family hx of        SOCIAL HISTORY:   Social History     Socioeconomic History    Marital status: Single   Tobacco Use    Smoking status: Former     Current packs/day: 0.00      Types: Cigarettes     Quit date: 2000     Years since quittin.2     Passive exposure: Past    Smokeless tobacco: Never   Vaping Use    Vaping status: Never Used   Substance and Sexual Activity    Alcohol use: Not Currently     Comment: OCCASTIONAL    Drug use: No    Sexual activity: Not Currently     Partners: Male     Birth control/protection: Condom   Other Topics Concern    Parent/sibling w/ CABG, MI or angioplasty before 65F 55M? No     Social Determinants of Health     Financial Resource Strain: Low Risk  (2024)    Financial Resource Strain     Within the past 12 months, have you or your family members you live with been unable to get utilities (heat, electricity) when it was really needed?: No   Food Insecurity: Low Risk  (2024)    Food Insecurity     Within the past 12 months, did you worry that your food would run out before you got money to buy more?: No     Within the past 12 months, did the food you bought just not last and you didn t have money to get more?: No   Transportation Needs: Low Risk  (2024)    Transportation Needs     Within the past 12 months, has lack of transportation kept you from medical appointments, getting your medicines, non-medical meetings or appointments, work, or from getting things that you need?: No   Physical Activity: Sufficiently Active (2024)    Exercise Vital Sign     Days of Exercise per Week: 5 days     Minutes of Exercise per Session: 60 min   Stress: No Stress Concern Present (2024)    [employer] - Occupational Stress Questionnaire     Feeling of Stress : Not at all   Social Connections: Unknown (2024)    Social Connection and Isolation Panel [NHANES]     Frequency of Social Gatherings with Friends and Family: More than three times a week   Interpersonal Safety: Low Risk  (2024)    Interpersonal Safety     Do you feel physically and emotionally safe where you currently live?: Yes     Within the past  12 months, have you been hit, slapped, kicked or otherwise physically hurt by someone?: No     Within the past 12 months, have you been humiliated or emotionally abused in other ways by your partner or ex-partner?: No   Housing Stability: Low Risk  (6/6/2024)    Housing Stability     Do you have housing? : Yes     Are you worried about losing your housing?: No       VITALS:  Patient Vitals for the past 24 hrs:   BP Temp Temp src Pulse Resp SpO2 Weight   09/23/24 1515 123/81 -- -- 78 -- 100 % --   09/23/24 1454 (!) 135/91 -- -- 74 -- 98 % --   09/23/24 1253 124/82 -- -- -- -- -- --   09/23/24 1214 (!) 157/96 97.9  F (36.6  C) Temporal 94 18 96 % 92.1 kg (203 lb)       PHYSICAL EXAM    VITAL SIGNS: /81   Pulse 78   Temp 97.9  F (36.6  C) (Temporal)   Resp 18   Wt 92.1 kg (203 lb)   LMP  (LMP Unknown)   SpO2 100%   BMI 33.78 kg/m    General Appearance: Uncomfortable appearing; Alert, cooperative, normal speech and facial symmetry, appears stated age  Head:  Normocephalic, without obvious abnormality, atraumatic  Eyes: Conjunctiva/corneas clear, EOM's intact, no nystagmus, PERRL  ENT:  Lips, mucosa, and tongue normal; teeth and gums normal, no pharyngeal inflammation, no dysphonia or difficulty swallowing, membranes are moist without pallor  Cardio:  Regular rate and rhythm, S1 and S2 normal, no murmur, rub    or gallop, 2+ pulses symmetric in all extremities  Pulm:  Clear to auscultation bilaterally, respirations unlabored with no accessory muscle use  Abdomen: RUQ tenderness to palpation; healing surgical scars on anterior abdominal wall without erythema, warmth, or discharge; Active bowel sounds in all quadrants; abdomen is soft, non-distended with no rebound tenderness or guarding.   Extremities: Moves all extremities  Neuro: Patient is awake, alert, and responsive to voice. No gross motor weaknesses or sensory loss; moves all extremities.    LAB:  All pertinent labs reviewed and interpreted.  Labs  Ordered and Resulted from Time of ED Arrival to Time of ED Departure   COMPREHENSIVE METABOLIC PANEL - Abnormal       Result Value    Sodium 139      Potassium 4.3      Carbon Dioxide (CO2) 19 (*)     Anion Gap 15      Urea Nitrogen 7.3      Creatinine 0.63      GFR Estimate >90      Calcium 8.8      Chloride 105      Glucose 181 (*)     Alkaline Phosphatase 120      AST 19      ALT 13      Protein Total 6.7      Albumin 4.1      Bilirubin Total 0.4     ROUTINE UA WITH MICROSCOPIC REFLEX TO CULTURE - Abnormal    Color Urine Yellow      Appearance Urine Clear      Glucose Urine Negative      Bilirubin Urine Negative      Ketones Urine Negative      Specific Gravity Urine 1.010      Blood Urine Negative      pH Urine 7.0      Protein Albumin Urine Negative      Urobilinogen Urine <2.0      Nitrite Urine Negative      Leukocyte Esterase Urine Negative      Mucus Urine Present (*)     RBC Urine 0      WBC Urine 1      Squamous Epithelials Urine 1     LIPASE - Normal    Lipase 36     TROPONIN T, HIGH SENSITIVITY - Normal    Troponin T, High Sensitivity 8     CBC WITH PLATELETS AND DIFFERENTIAL    WBC Count 8.0      RBC Count 5.16      Hemoglobin 14.7      Hematocrit 40.9      MCV 79      MCH 28.5      MCHC 35.9      RDW 14.0      Platelet Count 271      % Neutrophils 60      % Lymphocytes 28      % Monocytes 8      % Eosinophils 2      % Basophils 1      % Immature Granulocytes 1      NRBCs per 100 WBC 0      Absolute Neutrophils 4.8      Absolute Lymphocytes 2.3      Absolute Monocytes 0.7      Absolute Eosinophils 0.2      Absolute Basophils 0.0      Absolute Immature Granulocytes 0.1      Absolute NRBCs 0.0     TROPONIN T, HIGH SENSITIVITY       RADIOLOGY:  Reviewed all pertinent imaging. Please see official radiology report.  CT Abdomen Pelvis w Contrast   Final Result   IMPRESSION:    Cholecystectomy with mild postsurgical fat stranding in the gallbladder fossa. No fluid collection or biliary dilation.              EKG:    Performed at: 15:19    I have independently reviewed and interpreted the EKG, along with the final read. EKG also reviewed by Dr. José Miguel Stahl.    Ventricular rate 72 bpm  FL interval 156 ms  QRS duration 66 ms  QT/QTc 406/444 ms  P-R-T axes 58 45 52    Impression: NSR      Cherelle Gomez PA-C  Emergency Medicine  [facility] EMERGENCY ROOM  [facility] [facility] [address]  [phone]  Dept: [phone]         Cherelle Gomze PA-C  09/23/24 3111

## 2024-09-23 NOTE — ED TRIAGE NOTES
Pt presents to the ED with c/o back pain, side pain, and abdominal pain. Pt states that she had gallbladder removed on 9/13. Pt reports on Saturday night, started having back spasms. Currently having pain on right side, that radiates to RUQ abdominal area. Denies N/V/D. No fevers     Triage Assessment (Adult)       Row Name 09/23/24 1216          Triage Assessment    Airway WDL WDL        Respiratory WDL    Respiratory WDL WDL        Skin Circulation/Temperature WDL    Skin Circulation/Temperature WDL WDL        Cardiac WDL    Cardiac WDL WDL        Peripheral/Neurovascular WDL    Peripheral Neurovascular WDL WDL        Cognitive/Neuro/Behavioral WDL    Cognitive/Neuro/Behavioral WDL WDL

## 2024-09-25 ENCOUNTER — PATIENT OUTREACH (OUTPATIENT)
Dept: CARE COORDINATION | Facility: CLINIC | Age: 54
End: 2024-09-25
Payer: COMMERCIAL

## 2024-09-25 NOTE — PROGRESS NOTES
[facility] Care Resource Center Contact  [facility]/Voicemail     Clinical Data: Care Coordination ED-sourced Outreach-     Outreach attempted x 2.  Left message on patient's voicemail, providing [facility]'s 24/7 scheduling and nurse triage phone number [phone] ([phone]) for questions/concerns and/or to schedule an appt with an [facility] provider.      Care Coordination introduction letter with explanation of Clinic Care Coordination services sent to patient via X[employer]ot. Clinic Care Coordination services remain available via referral if needed.    Plan: [facility] will do no further outreaches at this time.       CHIP Kasper  [facility] Resource [place], [facility]    *Connected Care Resource Team does NOT follow patient ongoing. Referrals are identified based on internal discharge reports and the outreach is to ensure patient has an understanding of their discharge instructions.

## 2024-09-25 NOTE — LETTER
Mana Bull  [address] [address]    Dear Mana Bull,      I am a team member within the Connected Care Resource Center with [facility]. I recently tried to reach you to ensure you were doing well following a recent visit within our health system. I also wanted to take this chance to introduce Clinic Care Coordination.     Below is a description of Clinic Care Coordination and how this team can further assist you:       The Clinic Care Coordination team is made up of a Registered Nurse, , Financial Resource Worker, and a Community Health Worker who understand and can help navigate the health care system. The goal of clinic care coordination is to help you manage your health, improve access to care, and achieve optimal health outcomes. They work alongside your provider to assist you in determining your health and social needs, obtain health care and community resources, and provide you with necessary information and education. Clinic Care Coordination can work with you through any barriers and develop a care plan that helps coordinate and strengthen the relationship between you and your care team.    If you wish to connect with the Clinic Care Coordination Team, please let your [facility] Primary Care Provider or Clinic Care Team know and they can place a referral. The Clinic Care Coordination team will then reach out by phone to further support you.    We are focused on providing you with the highest-quality healthcare experience possible.    Sincerely,   Nayla RENDON  Community Health Worker    Connected Care Resources   [facility]

## 2024-10-14 NOTE — TELEPHONE ENCOUNTER
2nd attempted  Patient Quality Outreach    Patient is due for the following:   Diabetes -  A1C, Eye Exam, and Foot Exam     Next Steps:   Schedule a office visit for diabetes check    Type of outreach:    Sent letter.    Next Steps:  Reach out within 90 days via Phone.    Max number of attempts reached: No. Will try again in 90 days if patient still on fail list.    Questions for provider review:    None           Jae Blanco MA  Chart routed to Care Team.

## 2024-10-15 ENCOUNTER — MYC MEDICAL ADVICE (OUTPATIENT)
Dept: FAMILY MEDICINE | Facility: CLINIC | Age: 54
End: 2024-10-15
Payer: COMMERCIAL

## 2024-10-16 NOTE — TELEPHONE ENCOUNTER
"OV 6/10/24 noted:  \"  The patient's weight loss result since the last visit was mixed based on weight loss without clear plan.  She is struggling with impulsivity around binging behaviors, in particular on days in which she is stressed.  Overall, she continues to work to eat consistently.  She is walking and physically active.  - For now, stop phentermine/topiramate.  Replace this with Vyvanse and plan to titrate with a goal of reducing binging behaviors.  - GLP-1 receptor agonist have not been tolerated.  5 mg of tirzepatide resulted in her struggling with burping and foul taste in her mouth.  - Plan to recheck in 3 months.  MyChart communication regarding effect of Vyvanse sooner.            Relevant Medications     lisdexamfetamine (VYVANSE) 30 MG capsule\"     "

## 2024-10-22 DIAGNOSIS — G58.8 INTERCOSTAL NEURALGIA: Primary | ICD-10-CM

## 2024-10-22 DIAGNOSIS — M79.2 NEURALGIA AND NEURITIS: ICD-10-CM

## 2024-10-22 RX ORDER — PREGABALIN 50 MG/1
100 CAPSULE ORAL 3 TIMES DAILY
Qty: 180 CAPSULE | Refills: 1 | Status: SHIPPED | OUTPATIENT
Start: 2024-10-22

## 2024-10-22 RX ORDER — CARBAMAZEPINE 100 MG/1
100 TABLET, EXTENDED RELEASE ORAL 2 TIMES DAILY
Qty: 60 TABLET | Refills: 1 | Status: SHIPPED | OUTPATIENT
Start: 2024-10-22

## 2024-10-23 DIAGNOSIS — G58.8 INTERCOSTAL NEURALGIA: Primary | ICD-10-CM

## 2024-10-29 ENCOUNTER — OFFICE VISIT (OUTPATIENT)
Dept: PHYSICAL MEDICINE AND REHAB | Facility: CLINIC | Age: 54
End: 2024-10-29
Attending: INTERNAL MEDICINE
Payer: COMMERCIAL

## 2024-10-29 VITALS
DIASTOLIC BLOOD PRESSURE: 82 MMHG | SYSTOLIC BLOOD PRESSURE: 130 MMHG | WEIGHT: 222 LBS | BODY MASS INDEX: 36.99 KG/M2 | HEIGHT: 65 IN | HEART RATE: 113 BPM

## 2024-10-29 DIAGNOSIS — R07.81 RIB PAIN ON RIGHT SIDE: ICD-10-CM

## 2024-10-29 DIAGNOSIS — M79.18 MYOFASCIAL PAIN: ICD-10-CM

## 2024-10-29 DIAGNOSIS — M54.14 RADICULAR PAIN OF THORACIC REGION: Primary | ICD-10-CM

## 2024-10-29 DIAGNOSIS — G58.8 INTERCOSTAL NEURALGIA: ICD-10-CM

## 2024-10-29 PROCEDURE — 99203 OFFICE O/P NEW LOW 30 MIN: CPT | Performed by: PHYSICAL MEDICINE & REHABILITATION

## 2024-10-29 RX ORDER — METHYLPREDNISOLONE 4 MG/1
TABLET ORAL
Qty: 21 TABLET | Refills: 0 | Status: SHIPPED | OUTPATIENT
Start: 2024-10-29

## 2024-10-29 ASSESSMENT — PAIN SCALES - GENERAL: PAINLEVEL_OUTOF10: MODERATE PAIN (5)

## 2024-10-29 NOTE — PATIENT INSTRUCTIONS
A physical therapy order was provided for you today.  You will be contacted by physical therapy.  If nobody contacts you within 3 to 5 days, please contact the clinic at [phone].  It will be very important for you to do your physical therapy exercises on a regular basis to decrease your pain and prevent future pain flares.   An CT  was ordered for you today.  You will be contacted by scheduling within 3 days.    If you are not contacted, please call Radiology at [phone].     Medrol dose pack has been prescribed today.  Please follow directions on package.  Do not take with NSAIDs (ibupreofen or aleve).  I would recommend tylenol (extra strength or extended release/8hour) over the counter as needed.

## 2024-10-29 NOTE — LETTER
10/29/2024      Mana Bull  [address] [place] [place]  [facility] [address]      Dear Colleague,    Thank you for referring your patient, Mana Bull, to the [facility]. Please see a copy of my visit note below.    Assessment/Plan:      Zahraa was seen today for rib pain.    Diagnoses and all orders for this visit:    Radicular pain of thoracic region  -     methylPREDNISolone (MEDROL DOSEPAK) 4 MG tablet therapy pack; Follow Package Directions  -     CT Chest w/o Contrast; Future  -     Physical Therapy  Referral; Future    Rib pain on right side  -     CT Chest w/o Contrast; Future  -     Physical Therapy  Referral; Future    Intercostal neuralgia  -     Spine  Referral  -     CT Chest w/o Contrast; Future  -     Physical Therapy  Referral; Future    Myofascial pain  -     methylPREDNISolone (MEDROL DOSEPAK) 4 MG tablet therapy pack; Follow Package Directions  -     Physical Therapy  Referral; Future         Assessment: Pleasant 53 year old female with history of hypertension, diabetes mellitus, OCD, PTSD With:    1.  2-month history of severe right lateral rib pain around rib 6-8 with radicular pain versus intercostal neuralgia starting in the mid axillary line radiating anteriorly with associated numbness.  Could also less likely represent atypical rib fracture with healing.  Has failed NSAIDs, activity modifications, even has had a cholecystectomy without any improvement.    2.  Myofascial pain right lateral ribs.    Discussion:    1.  I discussed the diagnosis and treatment options.  I do not have a definitive diagnosis as of yet but likely radicular pain versus intercostal neuralgia or atypical fracture of the ribs ribs 6-8 region on the right.  We discussed options of medications, therapy, diagnostics.    2.  CT chest to evaluate the right lateral ribs for lesion and can also look at the thoracic spine to evaluate for bony  foraminal stenosis.    3.  Start physical therapy for parascapular strengthening and rib mobilization.    4.  Trial Medrol Dosepak for pain.    5.  Follow-up 1 month.  Can consider MRI of the thoracic spine if CT chest is unremarkable.      It was our pleasure caring for your patient today, if there any questions or concerns please do not hesitate to contact us.      Subjective:   Patient ID: Mana Bull is a 53 year old female.    History of Present Illness: Patient presents at the request of  for an evaluation of right-sided rib pain.  This started August 28.  She awoke with pain over the right ribs around ribs 6-8 mid axillary line and started to radiate anteriorly and posteriorly with paresthesias and pain.  No numbness anteriorly.  Constant pain worse with bending twisting moving.  Better with heat ice or lying down.  Now constant with numbness anteriorly nothing posteriorly towards the thoracic spine.  Lidocaine patch was not helpful along with ibuprofen or Tylenol.  Has not had physical therapy or chiropractic.  Has been to urgent care and subsequently was evaluated and had a cholecystectomy had improved pain for a week and the pain returned.  Is now on Lyrica helps some.  Pain is a 5/10 today.  Some pain with coughing or laughing at times.      Imaging: CT abdomen pelvis images personally reviewed to evaluate the ribs and lower thoracic spine.  Vertebral bodies T9-10-11 and 12 are visualized only partial T9 ribs 8 partially visualized rib 9/10/2011 and 12 are more thoroughly visualized without any fractures.  I do not appreciate any lower thoracic compression fractures.       Review of Systems: Complains of weight gain, headache, difficulty swallowing, palpitations, diarrhea, nausea and vomiting, reflux, joint pain muscle pain muscle fatigue, skin itching, dizziness, excessive tenderness anxiety and depression.  Remainder of 12 point review systems negative unless listed  above.      Current Outpatient Medications   Medication Sig Dispense Refill     carBAMazepine (TEGRETOL XR) 100 MG 12 hr tablet Take 1 tablet (100 mg) by mouth 2 times daily. 60 tablet 1     lidocaine (LIDODERM) 5 % patch Place onto the skin daily as needed for moderate pain. To prevent lidocaine toxicity, patient should be patch free for 12 hrs daily.       methylPREDNISolone (MEDROL DOSEPAK) 4 MG tablet therapy pack Follow Package Directions 21 tablet 0     pregabalin (LYRICA) 50 MG capsule Take 2 capsules (100 mg) by mouth 3 times daily. 180 capsule 1     rosuvastatin (CRESTOR) 10 MG tablet Take 1 tablet (10 mg) by mouth daily 90 tablet 1     valACYclovir (VALTREX) 1000 mg tablet Take 2,000 mg by mouth 2 times daily as needed. X 2 doses       cyclobenzaprine (FLEXERIL) 5 MG tablet Take 1 tablet (5 mg) by mouth 3 times daily as needed for muscle spasms. 30 tablet 0     oxyCODONE (ROXICODONE) 5 MG tablet Take 1 tablet (5 mg) by mouth every 4 hours as needed for moderate to severe pain. 6 tablet 0     No current facility-administered medications for this visit.       Past Medical History:   Diagnosis Date     Alcohol abuse      Diabetes (H)      Hypertension 2015     OCD (obsessive compulsive disorder)     mostly resolved.  Tactile symptoms as teenager.     PTSD (post-traumatic stress disorder)      Suspected COVID-19 virus infection 2020       Family History   Problem Relation Age of Onset     Diabetes Mother      Hypertension Mother      Hyperlipidemia Mother      Cerebrovascular Disease Mother      Breast Cancer Mother         late 50s     Obesity Mother      Hypertension Father      Diabetes Father              No Known Problems Sister      No Known Problems Sister      Hyperlipidemia Maternal Grandmother              Hyperlipidemia Maternal Grandfather      Diabetes Maternal Grandfather              Hyperlipidemia Paternal Grandmother              Hyperlipidemia  Paternal Grandfather              Thyroid Cancer No family hx of          Social History     Socioeconomic History     Marital status: Single     Spouse name: None     Number of children: None     Years of education: None     Highest education level: None   Tobacco Use     Smoking status: Former     Current packs/day: 0.00     Types: Cigarettes     Quit date: 2000     Years since quittin.3     Passive exposure: Past     Smokeless tobacco: Never   Vaping Use     Vaping status: Never Used   Substance and Sexual Activity     Alcohol use: Not Currently     Comment: OCCASTIONAL     Drug use: No     Sexual activity: Not Currently     Partners: Male     Birth control/protection: Condom   Other Topics Concern     Parent/sibling w/ CABG, MI or angioplasty before 65F 55M? No     Social Drivers of Health     Financial Resource Strain: Low Risk  (2024)    Financial Resource Strain      Within the past 12 months, have you or your family members you live with been unable to get utilities (heat, electricity) when it was really needed?: No   Food Insecurity: Low Risk  (2024)    Food Insecurity      Within the past 12 months, did you worry that your food would run out before you got money to buy more?: No      Within the past 12 months, did the food you bought just not last and you didn t have money to get more?: No   Transportation Needs: Low Risk  (2024)    Transportation Needs      Within the past 12 months, has lack of transportation kept you from medical appointments, getting your medicines, non-medical meetings or appointments, work, or from getting things that you need?: No   Physical Activity: Sufficiently Active (2024)    Exercise Vital Sign      Days of Exercise per Week: 5 days      Minutes of Exercise per Session: 60 min   Stress: No Stress Concern Present (2024)    [place] [employer] [place] [employer] - Occupational Stress Questionnaire      Feeling of Stress : Not at all  "  Social Connections: Unknown (6/6/2024)    Social Connection and Isolation Panel [NHANES]      Frequency of Social Gatherings with Friends and Family: More than three times a week   Interpersonal Safety: Low Risk  (9/13/2024)    Interpersonal Safety      Do you feel physically and emotionally safe where you currently live?: Yes      Within the past 12 months, have you been hit, slapped, kicked or otherwise physically hurt by someone?: No      Within the past 12 months, have you been humiliated or emotionally abused in other ways by your partner or ex-partner?: No   Housing Stability: Low Risk  (6/6/2024)    Housing Stability      Do you have housing? : Yes      Are you worried about losing your housing?: No       The following portions of the patient's history were reviewed and updated as appropriate: allergies, current medications, past family history, past medical history, past social history, past surgical history and problem list.    Oswestry (RAIMUNDO) Questionnaire        10/28/2024     1:30 PM   OSWESTRY DISABILITY INDEX   Count 10    Sum 26    Oswestry Score (%) 52 %        Patient-reported       Neck Disability Index:      10/28/2024     1:34 PM   Neck Disability Index (  Ant H. and Bonnie SIMON. 1991. All rights reserved.; used with permission)   SECTION 1 - PAIN INTENSITY 0    SECTION 2 - PERSONAL CARE 0    SECTION 3 - LIFTING 0    SECTION 4 - READING 0    SECTION 5 - HEADACHES 1    SECTION 6 - CONCENTRATION 0    SECTION 7 - WORK 2    SECTION 8 - DRIVING 0    SECTION 9 - SLEEPING 0    SECTION 10 - RECREATION 0    Count 10    Sum 3    Raw Score: /50 3    Neck Disability Index Score: (%) 6 %        Patient-reported          PHQ-2 Score:         3/18/2024     2:49 PM 2/5/2024    10:23 AM   PHQ-2 ( 1999 [employer])   Q1: Little interest or pleasure in doing things 0 0   Q2: Feeling down, depressed or hopeless 0 0   PHQ-2 Score 0 0                  Objective:   Physical Exam:    /82   Pulse 113   Ht 5' 5\" " (1.651 m)   Wt 222 lb (100.7 kg)   LMP  (LMP Unknown)   BMI 36.94 kg/m    Body mass index is 36.94 kg/m .      General:  Well-appearing female in no acute distress.  Pleasant,   cooperative, and interactive throughout the examination and interview.  CV: No lower extremity edema.  Lymphatics: No cervical lymphadenopathy palpated.  Eyes: sclera clear.  Skin: No rashes or lesions seen over the right lateral abdomen/lower rib cage on the right through rib 5.  No rashes over the ankles arms face..  Respirations unlabored.  MSK: Gait is nonantalgic.  Able to heel-toe walk without difficulty.    Negative Romberg.  Spine: normal AP curves of the C, T, and L spine.  Full range of motion in the lumbar spine   In cervical spine all planes.  Palpation: Tenderness to palpation over right ribs 6 through 8 midaxillary line.  Extremities: Full range of motion of the shoulders, elbows, and wrists with no effusions or tenderness to palpation.  Negative arm drop, empty can, and Speed's test bilaterally.   Full range of motion of the hips, knees, and ankles from a seated position with no effusions or tenderness to palpation.  Slight hypomobility of ribs 6 7 and 8 on the right laterally.      Neurologic exam: Mental status: Patient is alert and oriented with normal affect.  Attention, knowledge, memory, and language are intact.  Normal coordination throughout the examination.  Reflexes are 2+ and symmetric biceps, triceps, brachioradialis, patellar, and Achilles with down-going toes and Negative Kassie's.  Sensation is intact to light touch throughout the upper and lower extremities bilaterally.  Manual muscle testing reveals 5 out of 5 strength in the shoulder abductors, elbow   flexors/extensors, wrist extensors, interosseous, and finger flexors; 5 out of 5   in the hip flexors, knee flexors/extensors, ankle plantar flexors, ankle   dorsiflexors, and EHL.  Normal muscle bulk and tone.  Negative   Spurling's test bilaterally.   Negative seated  straight leg raise bilaterally.        Again, thank you for allowing me to participate in the care of your patient.        Sincerely,        Ludwig Alejo, DO

## 2024-10-29 NOTE — PROGRESS NOTES
Assessment/Plan:      Zahraa was seen today for rib pain.    Diagnoses and all orders for this visit:    Radicular pain of thoracic region  -     methylPREDNISolone (MEDROL DOSEPAK) 4 MG tablet therapy pack; Follow Package Directions  -     CT Chest w/o Contrast; Future  -     Physical Therapy  Referral; Future    Rib pain on right side  -     CT Chest w/o Contrast; Future  -     Physical Therapy  Referral; Future    Intercostal neuralgia  -     Spine  Referral  -     CT Chest w/o Contrast; Future  -     Physical Therapy  Referral; Future    Myofascial pain  -     methylPREDNISolone (MEDROL DOSEPAK) 4 MG tablet therapy pack; Follow Package Directions  -     Physical Therapy  Referral; Future         Assessment: Pleasant 53 year old female with history of hypertension, diabetes mellitus, OCD, PTSD With:    1.  2-month history of severe right lateral rib pain around rib 6-8 with radicular pain versus intercostal neuralgia starting in the mid axillary line radiating anteriorly with associated numbness.  Could also less likely represent atypical rib fracture with healing.  Has failed NSAIDs, activity modifications, even has had a cholecystectomy without any improvement.    2.  Myofascial pain right lateral ribs.    Discussion:    1.  I discussed the diagnosis and treatment options.  I do not have a definitive diagnosis as of yet but likely radicular pain versus intercostal neuralgia or atypical fracture of the ribs ribs 6-8 region on the right.  We discussed options of medications, therapy, diagnostics.    2.  CT chest to evaluate the right lateral ribs for lesion and can also look at the thoracic spine to evaluate for bony foraminal stenosis.    3.  Start physical therapy for parascapular strengthening and rib mobilization.    4.  Trial Medrol Dosepak for pain.    5.  Follow-up 1 month.  Can consider MRI of the thoracic spine if CT chest is unremarkable.      It was our  pleasure caring for your patient today, if there any questions or concerns please do not hesitate to contact us.      Subjective:   Patient ID: Mana Bull is a 53 year old female.    History of Present Illness: Patient presents at the request of  for an evaluation of right-sided rib pain.  This started August 28.  She awoke with pain over the right ribs around ribs 6-8 mid axillary line and started to radiate anteriorly and posteriorly with paresthesias and pain.  No numbness anteriorly.  Constant pain worse with bending twisting moving.  Better with heat ice or lying down.  Now constant with numbness anteriorly nothing posteriorly towards the thoracic spine.  Lidocaine patch was not helpful along with ibuprofen or Tylenol.  Has not had physical therapy or chiropractic.  Has been to urgent care and subsequently was evaluated and had a cholecystectomy had improved pain for a week and the pain returned.  Is now on Lyrica helps some.  Pain is a 5/10 today.  Some pain with coughing or laughing at times.      Imaging: CT abdomen pelvis images personally reviewed to evaluate the ribs and lower thoracic spine.  Vertebral bodies T9-10-11 and 12 are visualized only partial T9 ribs 8 partially visualized rib 9/10/2011 and 12 are more thoroughly visualized without any fractures.  I do not appreciate any lower thoracic compression fractures.       Review of Systems: Complains of weight gain, headache, difficulty swallowing, palpitations, diarrhea, nausea and vomiting, reflux, joint pain muscle pain muscle fatigue, skin itching, dizziness, excessive tenderness anxiety and depression.  Remainder of 12 point review systems negative unless listed above.      Current Outpatient Medications   Medication Sig Dispense Refill    carBAMazepine (TEGRETOL XR) 100 MG 12 hr tablet Take 1 tablet (100 mg) by mouth 2 times daily. 60 tablet 1    lidocaine (LIDODERM) 5 % patch Place onto the skin daily as needed for moderate  pain. To prevent lidocaine toxicity, patient should be patch free for 12 hrs daily.      methylPREDNISolone (MEDROL DOSEPAK) 4 MG tablet therapy pack Follow Package Directions 21 tablet 0    pregabalin (LYRICA) 50 MG capsule Take 2 capsules (100 mg) by mouth 3 times daily. 180 capsule 1    rosuvastatin (CRESTOR) 10 MG tablet Take 1 tablet (10 mg) by mouth daily 90 tablet 1    valACYclovir (VALTREX) 1000 mg tablet Take 2,000 mg by mouth 2 times daily as needed. X 2 doses      cyclobenzaprine (FLEXERIL) 5 MG tablet Take 1 tablet (5 mg) by mouth 3 times daily as needed for muscle spasms. 30 tablet 0    oxyCODONE (ROXICODONE) 5 MG tablet Take 1 tablet (5 mg) by mouth every 4 hours as needed for moderate to severe pain. 6 tablet 0     No current facility-administered medications for this visit.       Past Medical History:   Diagnosis Date    Alcohol abuse     Diabetes (H)     Hypertension 2015    OCD (obsessive compulsive disorder)     mostly resolved.  Tactile symptoms as teenager.    PTSD (post-traumatic stress disorder)     Suspected COVID-19 virus infection 2020       Family History   Problem Relation Age of Onset    Diabetes Mother     Hypertension Mother     Hyperlipidemia Mother     Cerebrovascular Disease Mother     Breast Cancer Mother         late 50s    Obesity Mother     Hypertension Father     Diabetes Father             No Known Problems Sister     No Known Problems Sister     Hyperlipidemia Maternal Grandmother             Hyperlipidemia Maternal Grandfather     Diabetes Maternal Grandfather             Hyperlipidemia Paternal Grandmother             Hyperlipidemia Paternal Grandfather             Thyroid Cancer No family hx of          Social History     Socioeconomic History    Marital status: Single     Spouse name: None    Number of children: None    Years of education: None    Highest education level: None   Tobacco Use    Smoking status: Former      Current packs/day: 0.00     Types: Cigarettes     Quit date: 2000     Years since quittin.3     Passive exposure: Past    Smokeless tobacco: Never   Vaping Use    Vaping status: Never Used   Substance and Sexual Activity    Alcohol use: Not Currently     Comment: OCCASTIONAL    Drug use: No    Sexual activity: Not Currently     Partners: Male     Birth control/protection: Condom   Other Topics Concern    Parent/sibling w/ CABG, MI or angioplasty before 65F 55M? No     Social Drivers of Health     Financial Resource Strain: Low Risk  (2024)    Financial Resource Strain     Within the past 12 months, have you or your family members you live with been unable to get utilities (heat, electricity) when it was really needed?: No   Food Insecurity: Low Risk  (2024)    Food Insecurity     Within the past 12 months, did you worry that your food would run out before you got money to buy more?: No     Within the past 12 months, did the food you bought just not last and you didn t have money to get more?: No   Transportation Needs: Low Risk  (2024)    Transportation Needs     Within the past 12 months, has lack of transportation kept you from medical appointments, getting your medicines, non-medical meetings or appointments, work, or from getting things that you need?: No   Physical Activity: Sufficiently Active (2024)    Exercise Vital Sign     Days of Exercise per Week: 5 days     Minutes of Exercise per Session: 60 min   Stress: No Stress Concern Present (2024)    [employer] [place] [employer] [employer] - Occupational Stress Questionnaire     Feeling of Stress : Not at all   Social Connections: Unknown (2024)    Social Connection and Isolation Panel [NHANES]     Frequency of Social Gatherings with Friends and Family: More than three times a week   Interpersonal Safety: Low Risk  (2024)    Interpersonal Safety     Do you feel physically and emotionally safe where you currently live?: Yes  "    Within the past 12 months, have you been hit, slapped, kicked or otherwise physically hurt by someone?: No     Within the past 12 months, have you been humiliated or emotionally abused in other ways by your partner or ex-partner?: No   Housing Stability: Low Risk  (6/6/2024)    Housing Stability     Do you have housing? : Yes     Are you worried about losing your housing?: No       The following portions of the patient's history were reviewed and updated as appropriate: allergies, current medications, past family history, past medical history, past social history, past surgical history and problem list.    Oswestry (RAIMUNDO) Questionnaire        10/28/2024     1:30 PM   OSWESTRY DISABILITY INDEX   Count 10    Sum 26    Oswestry Score (%) 52 %        Patient-reported       Neck Disability Index:      10/28/2024     1:34 PM   Neck Disability Index (  Ant RAHMAN. and Bonnie SIMON. 1991. All rights reserved.; used with permission)   SECTION 1 - PAIN INTENSITY 0    SECTION 2 - PERSONAL CARE 0    SECTION 3 - LIFTING 0    SECTION 4 - READING 0    SECTION 5 - HEADACHES 1    SECTION 6 - CONCENTRATION 0    SECTION 7 - WORK 2    SECTION 8 - DRIVING 0    SECTION 9 - SLEEPING 0    SECTION 10 - RECREATION 0    Count 10    Sum 3    Raw Score: /50 3    Neck Disability Index Score: (%) 6 %        Patient-reported          PHQ-2 Score:         3/18/2024     2:49 PM 2/5/2024    10:23 AM   PHQ-2 ( 1999 Pfizer)   Q1: Little interest or pleasure in doing things 0 0   Q2: Feeling down, depressed or hopeless 0 0   PHQ-2 Score 0 0                  Objective:   Physical Exam:    /82   Pulse 113   Ht 5' 5\" (1.651 m)   Wt 222 lb (100.7 kg)   LMP  (LMP Unknown)   BMI 36.94 kg/m    Body mass index is 36.94 kg/m .      General:  Well-appearing female in no acute distress.  Pleasant,   cooperative, and interactive throughout the examination and interview.  CV: No lower extremity edema.  Lymphatics: No cervical lymphadenopathy palpated.  Eyes: " sclera clear.  Skin: No rashes or lesions seen over the right lateral abdomen/lower rib cage on the right through rib 5.  No rashes over the ankles arms face..  Respirations unlabored.  MSK: Gait is nonantalgic.  Able to heel-toe walk without difficulty.    Negative Romberg.  Spine: normal AP curves of the C, T, and L spine.  Full range of motion in the lumbar spine   In cervical spine all planes.  Palpation: Tenderness to palpation over right ribs 6 through 8 midaxillary line.  Extremities: Full range of motion of the shoulders, elbows, and wrists with no effusions or tenderness to palpation.  Negative arm drop, empty can, and Speed's test bilaterally.   Full range of motion of the hips, knees, and ankles from a seated position with no effusions or tenderness to palpation.  Slight hypomobility of ribs 6 7 and 8 on the right laterally.      Neurologic exam: Mental status: Patient is alert and oriented with normal affect.  Attention, knowledge, memory, and language are intact.  Normal coordination throughout the examination.  Reflexes are 2+ and symmetric biceps, triceps, brachioradialis, patellar, and Achilles with down-going toes and Negative Kassie's.  Sensation is intact to light touch throughout the upper and lower extremities bilaterally.  Manual muscle testing reveals 5 out of 5 strength in the shoulder abductors, elbow   flexors/extensors, wrist extensors, interosseous, and finger flexors; 5 out of 5   in the hip flexors, knee flexors/extensors, ankle plantar flexors, ankle   dorsiflexors, and EHL.  Normal muscle bulk and tone.  Negative   Spurling's test bilaterally.  Negative seated  straight leg raise bilaterally.

## 2024-11-06 ENCOUNTER — HOSPITAL ENCOUNTER (OUTPATIENT)
Dept: CT IMAGING | Facility: CLINIC | Age: 54
Discharge: HOME | End: 2024-11-06
Attending: PHYSICAL MEDICINE & REHABILITATION | Admitting: PHYSICAL MEDICINE & REHABILITATION
Payer: COMMERCIAL

## 2024-11-06 DIAGNOSIS — G58.8 INTERCOSTAL NEURALGIA: ICD-10-CM

## 2024-11-06 DIAGNOSIS — M54.14 RADICULAR PAIN OF THORACIC REGION: ICD-10-CM

## 2024-11-06 DIAGNOSIS — R07.81 RIB PAIN ON RIGHT SIDE: ICD-10-CM

## 2024-11-06 PROCEDURE — 71250 CT THORAX DX C-: CPT

## 2024-11-07 DIAGNOSIS — M54.14 RADICULAR PAIN OF THORACIC REGION: ICD-10-CM

## 2024-11-07 DIAGNOSIS — R07.81 RIB PAIN ON RIGHT SIDE: Primary | ICD-10-CM

## 2024-11-09 ENCOUNTER — HEALTH MAINTENANCE LETTER (OUTPATIENT)
Age: 54
End: 2024-11-09

## 2024-12-12 DIAGNOSIS — L20.82 FLEXURAL ECZEMA: Primary | ICD-10-CM

## 2024-12-12 RX ORDER — TRIAMCINOLONE ACETONIDE 1 MG/G
CREAM TOPICAL 2 TIMES DAILY
Qty: 80 G | Refills: 3 | Status: SHIPPED | OUTPATIENT
Start: 2024-12-12

## 2024-12-23 ENCOUNTER — VIRTUAL VISIT (OUTPATIENT)
Dept: FAMILY MEDICINE | Facility: CLINIC | Age: 54
End: 2024-12-23
Payer: COMMERCIAL

## 2024-12-23 DIAGNOSIS — R63.2 BINGE EATING: ICD-10-CM

## 2024-12-23 DIAGNOSIS — E11.9 TYPE 2 DIABETES MELLITUS WITHOUT COMPLICATION, WITHOUT LONG-TERM CURRENT USE OF INSULIN (H): Primary | ICD-10-CM

## 2024-12-23 DIAGNOSIS — I10 BENIGN ESSENTIAL HYPERTENSION: ICD-10-CM

## 2024-12-23 DIAGNOSIS — E66.01 CLASS 2 SEVERE OBESITY DUE TO EXCESS CALORIES WITH SERIOUS COMORBIDITY AND BODY MASS INDEX (BMI) OF 38.0 TO 38.9 IN ADULT (H): ICD-10-CM

## 2024-12-23 DIAGNOSIS — E66.812 CLASS 2 SEVERE OBESITY DUE TO EXCESS CALORIES WITH SERIOUS COMORBIDITY AND BODY MASS INDEX (BMI) OF 38.0 TO 38.9 IN ADULT (H): ICD-10-CM

## 2024-12-23 PROCEDURE — 99214 OFFICE O/P EST MOD 30 MIN: CPT | Mod: 95 | Performed by: FAMILY MEDICINE

## 2024-12-23 PROCEDURE — G2211 COMPLEX E/M VISIT ADD ON: HCPCS | Mod: 95 | Performed by: FAMILY MEDICINE

## 2024-12-23 RX ORDER — PHENTERMINE HYDROCHLORIDE 30 MG/1
30 CAPSULE ORAL EVERY MORNING
Qty: 90 CAPSULE | Refills: 0 | Status: SHIPPED | OUTPATIENT
Start: 2024-12-23

## 2024-12-23 RX ORDER — TOPIRAMATE 25 MG/1
50 TABLET, FILM COATED ORAL
Qty: 60 TABLET | Refills: 1 | Status: SHIPPED | OUTPATIENT
Start: 2024-12-23

## 2024-12-23 NOTE — ASSESSMENT & PLAN NOTE
We reviewed weight and weight loss.  Since she had her gallbladder removed this fall she has been gaining weight.  She has had side effects with GLP-1 receptor agonist but we did talk about whether or not this was a dose-dependent phenomena or whether or not this was related to gallbladder (or lack of nutritional change and simply portion control).  Vyvanse was not thoroughly explored and is theoretically also an option.  She admits that it did help with some of the binging behaviors.  For now we will resume phentermine/topiramate.  Of asked her to stop by clinic and get a blood pressure measurement.  Recently it is consistently been normal and she is not on active therapy.  Using her diabetes diagnosis we could also try either an SGLT2 inhibitor or a low-dose of semaglutide to see if this complements nutritional plan.  Follow-up recommended approximately 2 months from now.

## 2024-12-23 NOTE — ASSESSMENT & PLAN NOTE
Overdue for A1c.  She will have this drawn.  We reviewed the potential benefit of low dose semaglutide.  Will defer for now.  Will also check blood pressure.

## 2024-12-23 NOTE — PROGRESS NOTES
Zahraa is a 54 year old who is being evaluated via a billable video visit.    How would you like to obtain your AVS? MyChart  If the video visit is dropped, the invitation should be resent by: Text to cell phone: [phone]  Will anyone else be joining your video visit? No      Assessment & Plan   Problem List Items Addressed This Visit       Benign essential hypertension     Recent blood pressure measurements have been within normal range.  Prior to starting phentermine and after starting phentermine she will have this checked in clinical setting.         Relevant Medications    phentermine 30 MG capsule    Binge eating    Class 2 severe obesity due to excess calories with serious comorbidity and body mass index (BMI) of 38.0 to 38.9 in adult (H)     We reviewed weight and weight loss.  Since she had her gallbladder removed this fall she has been gaining weight.  She has had side effects with GLP-1 receptor agonist but we did talk about whether or not this was a dose-dependent phenomena or whether or not this was related to gallbladder (or lack of nutritional change and simply portion control).  Vyvanse was not thoroughly explored and is theoretically also an option.  She admits that it did help with some of the binging behaviors.  For now we will resume phentermine/topiramate.  Of asked her to stop by clinic and get a blood pressure measurement.  Recently it is consistently been normal and she is not on active therapy.  Using her diabetes diagnosis we could also try either an SGLT2 inhibitor or a low-dose of semaglutide to see if this complements nutritional plan.  Follow-up recommended approximately 2 months from now.         Relevant Medications    phentermine 30 MG capsule    topiramate (TOPAMAX) 25 MG tablet    Type 2 diabetes mellitus without complication, without long-term current use of insulin (H) - Primary     Overdue for A1c.  She will have this drawn.  We reviewed the potential benefit of low dose  "semaglutide.  Will defer for now.  Will also check blood pressure.           Relevant Orders    OPTOMETRY REFERRAL    Hemoglobin A1c             BMI  Estimated body mass index is 36.94 kg/m  as calculated from the following:    Height as of 10/29/24: 1.651 m (5' 5\").    Weight as of 10/29/24: 100.7 kg (222 lb).   Weight management plan: Specific weight management program called [facility] discussed    The longitudinal plan of care for the diagnosis(es)/condition(s) as documented were addressed during this visit. Due to the added complexity in care, I will continue to support Zahraa in the subsequent management and with ongoing continuity of care.    Chidi Vitla is a 54 year old, presenting for the following health issues:  Recheck Medication (Discuss phentermine for weight loss)        12/23/2024    10:53 AM   Additional Questions   Roomed by Obed Rocha MA   Accompanied by Self         12/23/2024    10:53 AM   Patient Reported Additional Medications   Patient reports taking the following new medications None     Weight gain:   - gallbladder out a few months ago followed by weight gain   - she has had persistent RUQ pain.      History of Present Illness       Reason for visit:  Discuss Weight Loss Medication    She eats 2-3 servings of fruits and vegetables daily.She consumes 4 sweetened beverage(s) daily.She exercises with enough effort to increase her heart rate 20 to 29 minutes per day.  She exercises with enough effort to increase her heart rate 3 or less days per week.   She is taking medications regularly.        Objective    Vitals - Patient Reported  Weight (Patient Reported): 104.3 kg (230 lb)  Height (Patient Reported): 165.1 cm (5' 5\")  BMI (Based on Pt Reported Ht/Wt): 38.27        Physical Exam   GENERAL: alert and no distress  EYES: Eyes grossly normal to inspection.  No discharge or erythema, or obvious scleral/conjunctival abnormalities.  RESP: No audible wheeze, cough, or " visible cyanosis.    SKIN: Visible skin clear. No significant rash, abnormal pigmentation or lesions.  NEURO: Cranial nerves grossly intact.  Mentation and speech appropriate for age.  PSYCH: Appropriate affect, tone, and pace of words          Video-Visit Details    Type of service:  Video Visit   Originating Location (pt. Location): Home    Distant Location (provider location):  On-site  Platform used for Video Visit: Reji  Signed Electronically by: Abimael Walker MD

## 2024-12-23 NOTE — ASSESSMENT & PLAN NOTE
Recent blood pressure measurements have been within normal range.  Prior to starting phentermine and after starting phentermine she will have this checked in clinical setting.

## 2024-12-26 ENCOUNTER — OFFICE VISIT (OUTPATIENT)
Dept: URGENT CARE | Facility: URGENT CARE | Age: 54
End: 2024-12-26
Payer: COMMERCIAL

## 2024-12-26 VITALS
WEIGHT: 222 LBS | TEMPERATURE: 98 F | RESPIRATION RATE: 16 BRPM | OXYGEN SATURATION: 96 % | SYSTOLIC BLOOD PRESSURE: 133 MMHG | DIASTOLIC BLOOD PRESSURE: 90 MMHG | HEART RATE: 89 BPM | BODY MASS INDEX: 36.94 KG/M2

## 2024-12-26 DIAGNOSIS — J06.9 VIRAL URI: Primary | ICD-10-CM

## 2024-12-26 DIAGNOSIS — R52 BODY ACHES: ICD-10-CM

## 2024-12-26 DIAGNOSIS — E11.9 TYPE 2 DIABETES MELLITUS WITHOUT COMPLICATION, WITHOUT LONG-TERM CURRENT USE OF INSULIN (H): ICD-10-CM

## 2024-12-26 LAB
FLUAV AG SPEC QL IA: NEGATIVE
FLUBV AG SPEC QL IA: NEGATIVE

## 2024-12-26 NOTE — PROGRESS NOTES
Assessment:       Viral URI    Body aches  - Influenza A & B Antigen - Clinic Collect    Type 2 diabetes mellitus without complication, without long-term current use of insulin (H)     Plan:     Symptoms consistent with a viral URI.  Influenza negative.  Recommend symptomatic care.  No indication for antibiotics at this time.  Follow-up if symptoms getting worse or not improving over the next 4 to 5 days.  Patient is agreeable with this plan.        Subjective:       54 year old female with obesity and type 2 diabetes presents for evaluation of a 1 day history of headache, fever, body aches, chills, and cough that started suddenly yesterday.  Denies much nasal congestion and has not had any sore throat or ear pain.    Patient Active Problem List   Diagnosis    Class 2 severe obesity due to excess calories with serious comorbidity and body mass index (BMI) of 38.0 to 38.9 in adult (H)    Benign essential hypertension    Type 2 diabetes mellitus without complication, without long-term current use of insulin (H)    Metabolic syndrome X    Avitaminosis D    Binge eating    History of alcohol abuse    Hidradenitis suppurativa of multiple sites    Cervical high risk HPV (human papillomavirus) test positive    Abdominal pain, right upper quadrant       Past Medical History:   Diagnosis Date    Alcohol abuse     Diabetes (H)     Hypertension 04/12/2015    OCD (obsessive compulsive disorder)     mostly resolved.  Tactile symptoms as teenager.    PTSD (post-traumatic stress disorder)     Suspected COVID-19 virus infection 11/30/2020       Past Surgical History:   Procedure Laterality Date    EYE SURGERY      LAPAROSCOPIC CHOLECYSTECTOMY N/A 9/13/2024    Procedure: CHOLECYSTECTOMY, ROBOT-ASSISTED, LAPAROSCOPIC, USING DA JOSH XI;  Surgeon: Darwin Rivera MD;  Location: [facility] Main OR       Current Outpatient Medications   Medication Sig Dispense Refill    phentermine 30 MG capsule Take 1 capsule (30 mg) by mouth  every morning. 90 capsule 0    topiramate (TOPAMAX) 25 MG tablet Take 2 tablets (50 mg) by mouth daily (with dinner). (for the first week, take 1 tab with dinner and increase as tolerated) 60 tablet 1    triamcinolone (KENALOG) 0.1 % external cream Apply topically 2 times daily. 80 g 3    valACYclovir (VALTREX) 1000 mg tablet Take 2,000 mg by mouth 2 times daily as needed. X 2 doses      rosuvastatin (CRESTOR) 10 MG tablet Take 1 tablet (10 mg) by mouth daily (Patient not taking: Reported on 2024) 90 tablet 1     No current facility-administered medications for this visit.       No Known Allergies    Family History   Problem Relation Age of Onset    Diabetes Mother     Hypertension Mother     Hyperlipidemia Mother     Cerebrovascular Disease Mother     Breast Cancer Mother         late 50s    Obesity Mother     Hypertension Father     Diabetes Father             No Known Problems Sister     No Known Problems Sister     Hyperlipidemia Maternal Grandmother             Hyperlipidemia Maternal Grandfather     Diabetes Maternal Grandfather             Hyperlipidemia Paternal Grandmother             Hyperlipidemia Paternal Grandfather             Thyroid Cancer No family hx of        Social History     Socioeconomic History    Marital status: Single     Spouse name: None    Number of children: None    Years of education: None    Highest education level: None   Tobacco Use    Smoking status: Former     Current packs/day: 0.00     Types: Cigarettes     Quit date: 2000     Years since quittin.4     Passive exposure: Past    Smokeless tobacco: Never   Vaping Use    Vaping status: Never Used   Substance and Sexual Activity    Alcohol use: Not Currently     Comment: OCCASTIONAL    Drug use: No    Sexual activity: Not Currently     Partners: Male     Birth control/protection: Condom   Other Topics Concern    Parent/sibling w/ CABG, MI or angioplasty before 65F 55M? No        Review of Systems  Pertinent items are noted in HPI.      Objective:                     General Appearance:    BP (!) 133/90 (BP Location: Right arm, Patient Position: Sitting, Cuff Size: Adult Regular)   Pulse 89   Temp 98  F (36.7  C) (Oral)   Resp 16   Wt 100.7 kg (222 lb)   LMP  (LMP Unknown)   SpO2 96%   BMI 36.94 kg/m          Alert, mildly ill-appearing but in no distress.  Frequent cough noted throughout the visit today.   Head:    Normocephalic, without obvious abnormality, atraumatic   Eyes:    Conjunctiva/corneas clear   Ears:    Normal TM's without erythema or bulging. Normal external ear canals, both ears   Nose:   Nares normal, septum midline, mucosa normal, no drainage    or sinus tenderness   Throat:   Lips, mucosa, and tongue normal; teeth and gums normal.  No tonsilar hypertrophy or exudate.   Neck:   Supple, symmetrical, trachea midline, no adenopathy    Lungs:     Clear to auscultation bilaterally without wheezes, rales, or rhonchi, respirations unlabored    Heart:    Regular rate and rhythm, S1 and S2 normal, no murmur, rub or gallop       Extremities:   Extremities normal, atraumatic, no cyanosis or edema   Skin:   Skin color, texture, turgor normal, no rashes or lesions           Results for orders placed or performed in visit on 12/26/24   Influenza A & B Antigen - Clinic Collect     Status: Normal    Specimen: Nose; Swab   Result Value Ref Range    Influenza A antigen Negative Negative    Influenza B antigen Negative Negative    Narrative    Test results must be correlated with clinical data. If necessary, results should be confirmed by a molecular assay or viral culture.       This note has been dictated using voice recognition software. Any grammatical or context distortions are unintentional and inherent to the software

## 2025-03-02 ENCOUNTER — HEALTH MAINTENANCE LETTER (OUTPATIENT)
Age: 55
End: 2025-03-02

## 2025-05-03 ASSESSMENT — ANXIETY QUESTIONNAIRES
8. IF YOU CHECKED OFF ANY PROBLEMS, HOW DIFFICULT HAVE THESE MADE IT FOR YOU TO DO YOUR WORK, TAKE CARE OF THINGS AT HOME, OR GET ALONG WITH OTHER PEOPLE?: VERY DIFFICULT
2. NOT BEING ABLE TO STOP OR CONTROL WORRYING: MORE THAN HALF THE DAYS
IF YOU CHECKED OFF ANY PROBLEMS ON THIS QUESTIONNAIRE, HOW DIFFICULT HAVE THESE PROBLEMS MADE IT FOR YOU TO DO YOUR WORK, TAKE CARE OF THINGS AT HOME, OR GET ALONG WITH OTHER PEOPLE: VERY DIFFICULT
5. BEING SO RESTLESS THAT IT IS HARD TO SIT STILL: NOT AT ALL
4. TROUBLE RELAXING: NEARLY EVERY DAY
GAD7 TOTAL SCORE: 14
3. WORRYING TOO MUCH ABOUT DIFFERENT THINGS: NEARLY EVERY DAY
GAD7 TOTAL SCORE: 14
GAD7 TOTAL SCORE: 14
7. FEELING AFRAID AS IF SOMETHING AWFUL MIGHT HAPPEN: SEVERAL DAYS
7. FEELING AFRAID AS IF SOMETHING AWFUL MIGHT HAPPEN: SEVERAL DAYS
1. FEELING NERVOUS, ANXIOUS, OR ON EDGE: MORE THAN HALF THE DAYS
6. BECOMING EASILY ANNOYED OR IRRITABLE: NEARLY EVERY DAY

## 2025-05-05 ENCOUNTER — MYC MEDICAL ADVICE (OUTPATIENT)
Dept: FAMILY MEDICINE | Facility: CLINIC | Age: 55
End: 2025-05-05
Payer: COMMERCIAL

## 2025-05-06 ENCOUNTER — PATIENT OUTREACH (OUTPATIENT)
Dept: CARE COORDINATION | Facility: CLINIC | Age: 55
End: 2025-05-06
Payer: COMMERCIAL

## 2025-05-07 ENCOUNTER — VIRTUAL VISIT (OUTPATIENT)
Dept: FAMILY MEDICINE | Facility: CLINIC | Age: 55
End: 2025-05-07
Payer: COMMERCIAL

## 2025-05-07 DIAGNOSIS — Z13.220 SCREENING FOR LIPID DISORDERS: ICD-10-CM

## 2025-05-07 DIAGNOSIS — Z12.31 VISIT FOR SCREENING MAMMOGRAM: ICD-10-CM

## 2025-05-07 DIAGNOSIS — Z13.1 SCREENING FOR DIABETES MELLITUS: ICD-10-CM

## 2025-05-07 DIAGNOSIS — I10 BENIGN ESSENTIAL HYPERTENSION: ICD-10-CM

## 2025-05-07 DIAGNOSIS — E11.9 TYPE 2 DIABETES MELLITUS WITHOUT COMPLICATION, WITHOUT LONG-TERM CURRENT USE OF INSULIN (H): ICD-10-CM

## 2025-05-07 DIAGNOSIS — E88.810 METABOLIC SYNDROME X: ICD-10-CM

## 2025-05-07 DIAGNOSIS — F33.1 MODERATE EPISODE OF RECURRENT MAJOR DEPRESSIVE DISORDER (H): Primary | ICD-10-CM

## 2025-05-07 PROCEDURE — 98006 SYNCH AUDIO-VIDEO EST MOD 30: CPT | Performed by: FAMILY MEDICINE

## 2025-05-07 RX ORDER — SERTRALINE HYDROCHLORIDE 100 MG/1
100 TABLET, FILM COATED ORAL DAILY
Qty: 30 TABLET | Refills: 2 | Status: SHIPPED | OUTPATIENT
Start: 2025-05-07

## 2025-05-07 ASSESSMENT — PATIENT HEALTH QUESTIONNAIRE - PHQ9
SUM OF ALL RESPONSES TO PHQ QUESTIONS 1-9: 20
10. IF YOU CHECKED OFF ANY PROBLEMS, HOW DIFFICULT HAVE THESE PROBLEMS MADE IT FOR YOU TO DO YOUR WORK, TAKE CARE OF THINGS AT HOME, OR GET ALONG WITH OTHER PEOPLE: VERY DIFFICULT
SUM OF ALL RESPONSES TO PHQ QUESTIONS 1-9: 20

## 2025-05-07 NOTE — ASSESSMENT & PLAN NOTE
Diabetes was not directly evaluated today.  She is overdue for laboratory testing.  This was ordered and will be completed prior to our follow-up appointment later this month (med check and physical).

## 2025-05-07 NOTE — ASSESSMENT & PLAN NOTE
New complaint today.  PHQ-9 elevated with passive suicidal ideation.  She has not previously struggled in this way.  She has been able to maintain sobriety from alcohol.  She is currently on a leave and requests [facility] paperwork.  This was completed with a return to work date of 6/1.  She is working to maintain and improve self-care.  She is attending alcoholic Anonymous meetings on a regular basis.  She is trying to be more physically active.  She has an appointment to restart psychotherapy although it is not scheduled until June.  I encouraged her to take advantage of her employers EAP program as a bridge.  Additionally, we discussed and we will implement a trial of an SSRI.  Sertraline with an initial target dose given suicidality of 100 mg.  She will take 50 mg for the first 10 days or so.  We discussed potential side effects.  Close follow-up given severity of symptoms.

## 2025-05-07 NOTE — PROGRESS NOTES
Zahraa is a 54 year old who is being evaluated via a billable video visit.    How would you like to obtain your AVS? MyChart  If the video visit is dropped, the invitation should be resent by: Send to e-mail at: [email or web address]  Will anyone else be joining your video visit? No      Assessment & Plan   Problem List Items Addressed This Visit       Benign essential hypertension    Relevant Orders    Basic metabolic panel  (Ca, Cl, CO2, Creat, Gluc, K, Na, BUN)    Metabolic syndrome X    Relevant Orders    Basic metabolic panel  (Ca, Cl, CO2, Creat, Gluc, K, Na, BUN)    ALT    Moderate episode of recurrent major depressive disorder (H) - Primary    Relevant Medications    sertraline (ZOLOFT) 100 MG tablet    Other Relevant Orders    Basic metabolic panel  (Ca, Cl, CO2, Creat, Gluc, K, Na, BUN)    ALT    TSH with free T4 reflex    Hemoglobin A1c    CBC with platelets    Type 2 diabetes mellitus without complication, without long-term current use of insulin (H)    Diabetes is .    Diabetes will be reassessed .         Relevant Orders    Basic metabolic panel  (Ca, Cl, CO2, Creat, Gluc, K, Na, BUN)    ALT    Hemoglobin A1c    Albumin Random Urine Quantitative with Creat Ratio     Other Visit Diagnoses         Visit for screening mammogram        Relevant Orders    MA Screening Bilateral w/ Dirk      Screening for lipid disorders        Relevant Orders    Lipid panel reflex to direct LDL Fasting      Screening for diabetes mellitus               Depression Screening Follow Up        5/7/2025     6:38 AM   PHQ   PHQ-9 Total Score 20    Q9: Thoughts of better off dead/self-harm past 2 weeks Several days   F/U: Thoughts of suicide or self-harm No   F/U: Safety concerns No       Patient-reported         5/7/2025     6:38 AM   Last PHQ-9   1.  Little interest or pleasure in doing things 3   2.  Feeling down, depressed, or hopeless 3   3.  Trouble falling or staying asleep, or sleeping too much 3   4.  Feeling tired or  having little energy 3   5.  Poor appetite or overeating 3   6.  Feeling bad about yourself 2   7.  Trouble concentrating 1   8.  Moving slowly or restless 1   Q9: Thoughts of better off dead/self-harm past 2 weeks 1   PHQ-9 Total Score 20    In the past two weeks have you had thoughts of suicide or self harm? No   Do you have concerns about your personal safety or the safety of others? No       Patient-reported       Follow Up Actions Taken  Crisis resource information provided in the After Visit Summary    Discussed the following ways the patient can remain in a safe environment:  be around others    The longitudinal plan of care for the diagnosis(es)/condition(s) as documented were addressed during this visit. Due to the added complexity in care, I will continue to support Zahraa in the subsequent management and with ongoing continuity of care.    Assessment & Plan  Visit for screening mammogram    Type 2 diabetes mellitus without complication, without long-term current use of insulin (H)  Diabetes was not directly evaluated today.  She is overdue for laboratory testing.  This was ordered and will be completed prior to our follow-up appointment later this month (med check and physical).  Metabolic syndrome X    Benign essential hypertension    Screening for lipid disorders    Screening for diabetes mellitus    Moderate episode of recurrent major depressive disorder (H)  New complaint today.  PHQ-9 elevated with passive suicidal ideation.  She has not previously struggled in this way.  She has been able to maintain sobriety from alcohol.  She is currently on a leave and requests [facility] paperwork.  This was completed with a return to work date of 6/1.  She is working to maintain and improve self-care.  She is attending alcoholic Anonymous meetings on a regular basis.  She is trying to be more physically active.  She has an appointment to restart psychotherapy although it is not scheduled until June.  I encouraged her  "to take advantage of her employers EAP program as a bridge.  Additionally, we discussed and we will implement a trial of an SSRI.  Sertraline with an initial target dose given suicidality of 100 mg.  She will take 50 mg for the first 10 days or so.  We discussed potential side effects.  Close follow-up given severity of symptoms.      Chidi Vital is a 54 year old, presenting for the following health issues:  Forms and  Follow Up        5/7/2025     6:51 AM   Additional Questions   Roomed by xl     Drained - \"mentally, physically, emotionally.\"  Ongoing depression, anxiety .  \"Things have been triggering that response.\"  Struggling in her work environment. \"Came to a head a couple of Friday's ago.\"   She has been able to maintain sobriety.  Attending AA.  Therapy.  Has appointment set.   Self care: writing, meditation. She notes that she is feeling better.  Appetite and sleep improved.   She wonders about depression medication.  Self harm / SI?  \"I have not had any suicidal ideation.\" Her thoughts have been passive.   She has been on wellbutrin in the past (during alcohol treatment).  Mom and sister on seroquel.     Weight:   - she has not been taking phentermine for the past few weeks.     - appetite has been low right now.      History of Present Illness       Mental Health Follow-up:  Patient presents to follow-up on Depression & Anxiety.Patient's depression since last visit has been:  Worse  The patient is having other symptoms associated with depression.  Patient's anxiety since last visit has been:  Medium  The patient is having other symptoms associated with anxiety.  Any significant life events: No  Patient is feeling anxious or having panic attacks.  Patient has no concerns about alcohol or drug use.   She is taking medications regularly.            5/7/2025     6:38 AM   PHQ   PHQ-9 Total Score 20    Q9: Thoughts of better off dead/self-harm past 2 weeks Several days   F/U: Thoughts of suicide or " self-harm No   F/U: Safety concerns No       Patient-reported         5/3/2025    11:10 PM   RADHA-7 SCORE   Total Score 14 (moderate anxiety)   Total Score 14        Patient-reported         Objective           Vitals:  No vitals were obtained today due to virtual visit.    Physical Exam   GENERAL: alert and no distress  EYES: Eyes grossly normal to inspection.  No discharge or erythema, or obvious scleral/conjunctival abnormalities.  RESP: No audible wheeze, cough, or visible cyanosis.    SKIN: Visible skin clear. No significant rash, abnormal pigmentation or lesions.  NEURO: Cranial nerves grossly intact.  Mentation and speech appropriate for age.  PSYCH: Appropriate affect, tone, and pace of words          Video-Visit Details    Type of service:  Video Visit   Originating Location (pt. Location): Home    Distant Location (provider location):  On-site  Platform used for Video Visit: Reji  Signed Electronically by: Abimael Walker MD

## 2025-05-08 ENCOUNTER — PATIENT OUTREACH (OUTPATIENT)
Dept: CARE COORDINATION | Facility: CLINIC | Age: 55
End: 2025-05-08
Payer: COMMERCIAL

## 2025-05-13 ENCOUNTER — MYC MEDICAL ADVICE (OUTPATIENT)
Dept: FAMILY MEDICINE | Facility: CLINIC | Age: 55
End: 2025-05-13
Payer: COMMERCIAL

## 2025-05-14 ENCOUNTER — PATIENT OUTREACH (OUTPATIENT)
Dept: CARE COORDINATION | Facility: CLINIC | Age: 55
End: 2025-05-14
Payer: COMMERCIAL

## 2025-05-14 ENCOUNTER — MYC MEDICAL ADVICE (OUTPATIENT)
Dept: FAMILY MEDICINE | Facility: CLINIC | Age: 55
End: 2025-05-14
Payer: COMMERCIAL

## 2025-05-27 ENCOUNTER — RESULTS FOLLOW-UP (OUTPATIENT)
Dept: FAMILY MEDICINE | Facility: CLINIC | Age: 55
End: 2025-05-27

## 2025-05-27 ENCOUNTER — LAB (OUTPATIENT)
Dept: LAB | Facility: CLINIC | Age: 55
End: 2025-05-27
Payer: COMMERCIAL

## 2025-05-27 DIAGNOSIS — E88.810 METABOLIC SYNDROME X: ICD-10-CM

## 2025-05-27 DIAGNOSIS — E11.9 TYPE 2 DIABETES MELLITUS WITHOUT COMPLICATION, WITHOUT LONG-TERM CURRENT USE OF INSULIN (H): ICD-10-CM

## 2025-05-27 DIAGNOSIS — F33.1 MODERATE EPISODE OF RECURRENT MAJOR DEPRESSIVE DISORDER (H): ICD-10-CM

## 2025-05-27 DIAGNOSIS — Z13.220 SCREENING FOR LIPID DISORDERS: ICD-10-CM

## 2025-05-27 DIAGNOSIS — I10 BENIGN ESSENTIAL HYPERTENSION: ICD-10-CM

## 2025-05-27 LAB
ALT SERPL W P-5'-P-CCNC: 11 U/L (ref 0–50)
ANION GAP SERPL CALCULATED.3IONS-SCNC: 14 MMOL/L (ref 7–15)
BUN SERPL-MCNC: 7.7 MG/DL (ref 6–20)
CALCIUM SERPL-MCNC: 9.7 MG/DL (ref 8.8–10.4)
CHLORIDE SERPL-SCNC: 100 MMOL/L (ref 98–107)
CHOLEST SERPL-MCNC: 265 MG/DL
CREAT SERPL-MCNC: 0.71 MG/DL (ref 0.51–0.95)
CREAT UR-MCNC: 307 MG/DL
EGFRCR SERPLBLD CKD-EPI 2021: >90 ML/MIN/1.73M2
ERYTHROCYTE [DISTWIDTH] IN BLOOD BY AUTOMATED COUNT: 13.7 % (ref 10–15)
EST. AVERAGE GLUCOSE BLD GHB EST-MCNC: 209 MG/DL
FASTING STATUS PATIENT QL REPORTED: YES
FASTING STATUS PATIENT QL REPORTED: YES
GLUCOSE SERPL-MCNC: 206 MG/DL (ref 70–99)
HBA1C MFR BLD: 8.9 % (ref 0–5.6)
HCO3 SERPL-SCNC: 25 MMOL/L (ref 22–29)
HCT VFR BLD AUTO: 43.5 % (ref 35–47)
HDLC SERPL-MCNC: 39 MG/DL
HGB BLD-MCNC: 15.5 G/DL (ref 11.7–15.7)
LDLC SERPL CALC-MCNC: 200 MG/DL
MCH RBC QN AUTO: 28.5 PG (ref 26.5–33)
MCHC RBC AUTO-ENTMCNC: 35.6 G/DL (ref 31.5–36.5)
MCV RBC AUTO: 80 FL (ref 78–100)
MICROALBUMIN UR-MCNC: 15.6 MG/L
MICROALBUMIN/CREAT UR: 5.08 MG/G CR (ref 0–25)
NONHDLC SERPL-MCNC: 226 MG/DL
PLATELET # BLD AUTO: 278 10E3/UL (ref 150–450)
POTASSIUM SERPL-SCNC: 4.6 MMOL/L (ref 3.4–5.3)
RBC # BLD AUTO: 5.44 10E6/UL (ref 3.8–5.2)
SODIUM SERPL-SCNC: 139 MMOL/L (ref 135–145)
TRIGL SERPL-MCNC: 132 MG/DL
TSH SERPL DL<=0.005 MIU/L-ACNC: 1.58 UIU/ML (ref 0.3–4.2)
WBC # BLD AUTO: 9.2 10E3/UL (ref 4–11)

## 2025-05-27 PROCEDURE — 85027 COMPLETE CBC AUTOMATED: CPT

## 2025-05-27 PROCEDURE — 84460 ALANINE AMINO (ALT) (SGPT): CPT

## 2025-05-27 PROCEDURE — 36415 COLL VENOUS BLD VENIPUNCTURE: CPT

## 2025-05-27 PROCEDURE — 3052F HG A1C>EQUAL 8.0%<EQUAL 9.0%: CPT

## 2025-05-27 PROCEDURE — 82570 ASSAY OF URINE CREATININE: CPT

## 2025-05-27 PROCEDURE — 82043 UR ALBUMIN QUANTITATIVE: CPT

## 2025-05-27 PROCEDURE — 80061 LIPID PANEL: CPT

## 2025-05-27 PROCEDURE — 80048 BASIC METABOLIC PNL TOTAL CA: CPT

## 2025-05-27 PROCEDURE — 83036 HEMOGLOBIN GLYCOSYLATED A1C: CPT

## 2025-05-27 PROCEDURE — 84443 ASSAY THYROID STIM HORMONE: CPT

## 2025-05-27 ASSESSMENT — ANXIETY QUESTIONNAIRES
5. BEING SO RESTLESS THAT IT IS HARD TO SIT STILL: NOT AT ALL
4. TROUBLE RELAXING: SEVERAL DAYS
IF YOU CHECKED OFF ANY PROBLEMS ON THIS QUESTIONNAIRE, HOW DIFFICULT HAVE THESE PROBLEMS MADE IT FOR YOU TO DO YOUR WORK, TAKE CARE OF THINGS AT HOME, OR GET ALONG WITH OTHER PEOPLE: SOMEWHAT DIFFICULT
7. FEELING AFRAID AS IF SOMETHING AWFUL MIGHT HAPPEN: NOT AT ALL
8. IF YOU CHECKED OFF ANY PROBLEMS, HOW DIFFICULT HAVE THESE MADE IT FOR YOU TO DO YOUR WORK, TAKE CARE OF THINGS AT HOME, OR GET ALONG WITH OTHER PEOPLE?: SOMEWHAT DIFFICULT
GAD7 TOTAL SCORE: 4
GAD7 TOTAL SCORE: 4
3. WORRYING TOO MUCH ABOUT DIFFERENT THINGS: SEVERAL DAYS
1. FEELING NERVOUS, ANXIOUS, OR ON EDGE: SEVERAL DAYS
7. FEELING AFRAID AS IF SOMETHING AWFUL MIGHT HAPPEN: NOT AT ALL
6. BECOMING EASILY ANNOYED OR IRRITABLE: NOT AT ALL
2. NOT BEING ABLE TO STOP OR CONTROL WORRYING: SEVERAL DAYS
GAD7 TOTAL SCORE: 4

## 2025-05-28 ENCOUNTER — PATIENT OUTREACH (OUTPATIENT)
Dept: CARE COORDINATION | Facility: CLINIC | Age: 55
End: 2025-05-28

## 2025-05-28 ENCOUNTER — VIRTUAL VISIT (OUTPATIENT)
Dept: FAMILY MEDICINE | Facility: CLINIC | Age: 55
End: 2025-05-28
Payer: COMMERCIAL

## 2025-05-28 DIAGNOSIS — E66.01 CLASS 2 SEVERE OBESITY DUE TO EXCESS CALORIES WITH SERIOUS COMORBIDITY AND BODY MASS INDEX (BMI) OF 38.0 TO 38.9 IN ADULT (H): Primary | ICD-10-CM

## 2025-05-28 DIAGNOSIS — E11.65 TYPE 2 DIABETES MELLITUS WITH HYPERGLYCEMIA, WITHOUT LONG-TERM CURRENT USE OF INSULIN (H): ICD-10-CM

## 2025-05-28 DIAGNOSIS — E78.00 HYPERCHOLESTEREMIA: ICD-10-CM

## 2025-05-28 DIAGNOSIS — E88.810 METABOLIC SYNDROME X: ICD-10-CM

## 2025-05-28 DIAGNOSIS — F33.1 MODERATE EPISODE OF RECURRENT MAJOR DEPRESSIVE DISORDER (H): ICD-10-CM

## 2025-05-28 DIAGNOSIS — E66.812 CLASS 2 SEVERE OBESITY DUE TO EXCESS CALORIES WITH SERIOUS COMORBIDITY AND BODY MASS INDEX (BMI) OF 38.0 TO 38.9 IN ADULT (H): Primary | ICD-10-CM

## 2025-05-28 PROCEDURE — 96127 BRIEF EMOTIONAL/BEHAV ASSMT: CPT | Mod: 95 | Performed by: FAMILY MEDICINE

## 2025-05-28 PROCEDURE — 98006 SYNCH AUDIO-VIDEO EST MOD 30: CPT | Performed by: FAMILY MEDICINE

## 2025-05-28 RX ORDER — ACYCLOVIR 400 MG/1
1 TABLET ORAL ONCE
Qty: 1 EACH | Refills: 0 | Status: SHIPPED | OUTPATIENT
Start: 2025-05-28 | End: 2025-05-28

## 2025-05-28 RX ORDER — ASPIRIN 81 MG/1
81 TABLET ORAL DAILY
COMMUNITY
Start: 2025-05-28

## 2025-05-28 RX ORDER — ACYCLOVIR 400 MG/1
1 TABLET ORAL
Qty: 9 EACH | Refills: 5 | Status: SHIPPED | OUTPATIENT
Start: 2025-05-28 | End: 2025-05-28

## 2025-05-28 RX ORDER — ACYCLOVIR 400 MG/1
1 TABLET ORAL
Qty: 9 EACH | Refills: 5 | Status: SHIPPED | OUTPATIENT
Start: 2025-05-28

## 2025-05-28 RX ORDER — ROSUVASTATIN CALCIUM 10 MG/1
10 TABLET, COATED ORAL DAILY
Qty: 90 TABLET | Refills: 1 | Status: SHIPPED | OUTPATIENT
Start: 2025-05-28

## 2025-05-28 RX ORDER — METFORMIN HYDROCHLORIDE 750 MG/1
1500 TABLET, EXTENDED RELEASE ORAL
Qty: 60 TABLET | Refills: 2 | Status: SHIPPED | OUTPATIENT
Start: 2025-05-28

## 2025-05-28 NOTE — ASSESSMENT & PLAN NOTE
I suspect weight is trending upward.  Discussed nutrition.  Face-to-face visit suggested with next follow-up.

## 2025-05-28 NOTE — ASSESSMENT & PLAN NOTE
The patient had laboratory testing which showed hemoglobin A1c of 8.9%.  This is considerably worse than she has previously experienced.  She has been on semaglutide in the past for weight loss purposes and actually gained weight.  She had sulfur burps that were intolerable while on tirzepatide.  We reviewed nutrition.  I suspect that some of her symptoms, in particular low energy is result of poor nutrition and diabetes.  - Resume statin.  Resume aspirin.  - Blood pressure: I will ask her to come in the clinic for a face-to-face visit with her next visit.  - Add an SGLT2 inhibitor.  Jardiance 10 mg prescribed.  Plan BMP 2 weeks after starting the therapy.  - Resume metformin.  Previously tolerated without side effects.  She has adequate kidney function based on recent measurements.  - CGM: Dexcom system if covered by insurance  - Follow-up 4 weeks.    Orders:    Continuous Glucose  (DEXCOM G7 ) FABIOLA; 1 each once for 1 dose. Use to read blood sugars as per manufacturers instructions    Continuous Glucose Sensor (DEXCOM G7 SENSOR) MISC; 1 each every 10 days. Change sensor every 10 days    Basic metabolic panel  (Ca, Cl, CO2, Creat, Gluc, K, Na, BUN); Future    empagliflozin (JARDIANCE) 10 MG TABS tablet; Take 1 tablet (10 mg) by mouth daily.    metFORMIN (GLUCOPHAGE-XR) 750 MG 24 hr tablet; Take 2 tablets (1,500 mg) by mouth daily (with dinner).

## 2025-05-28 NOTE — ASSESSMENT & PLAN NOTE
In some ways, her anxiety/depression symptoms have improved.  Her RADHA-7 scores have gone down considerably.  However, she still feels as though she struggles with energy and motivation.  She an ominous feeling as she thinks about going back to work next week.  She has not had the opportunity to start therapy yet due to scheduling challenges but she does have an appointment later this week.  She is 3 weeks into a new medicine which seems to be helpful.  Her plan is still maturing.  At this time, she is not yet ready to go back to work.  I will complete [facility] paperwork extending her leave through 6/16 (that is her return to work date).  We discussed her disability claim.  I am concerned that to correctly state that she is disabled will be difficult to defend.  The nature of her work does not allow for accommodations.  She is either working or not working.  We will do our best to try to justify this.

## 2025-05-28 NOTE — PROGRESS NOTES
Zahraa is a 54 year old who is being evaluated via a billable video visit.    How would you like to obtain your AVS? MyChart  If the video visit is dropped, the invitation should be resent by: Send to e-mail at: [email or web address]  Will anyone else be joining your video visit? No      Assessment & Plan   Assessment & Plan  Class 2 severe obesity due to excess calories with serious comorbidity and body mass index (BMI) of 38.0 to 38.9 in adult (H)  I suspect weight is trending upward.  Discussed nutrition.  Face-to-face visit suggested with next follow-up.       Type 2 diabetes mellitus with hyperglycemia, without long-term current use of insulin (H)  The patient had laboratory testing which showed hemoglobin A1c of 8.9%.  This is considerably worse than she has previously experienced.  She has been on semaglutide in the past for weight loss purposes and actually gained weight.  She had sulfur burps that were intolerable while on tirzepatide.  We reviewed nutrition.  I suspect that some of her symptoms, in particular low energy is result of poor nutrition and diabetes.  - Resume statin.  Resume aspirin.  - Blood pressure: I will ask her to come in the clinic for a face-to-face visit with her next visit.  - Add an SGLT2 inhibitor.  Jardiance 10 mg prescribed.  Plan BMP 2 weeks after starting the therapy.  - Resume metformin.  Previously tolerated without side effects.  She has adequate kidney function based on recent measurements.  - CGM: Dexcom system if covered by insurance  - Follow-up 4 weeks.    Orders:    Continuous Glucose  (DEXCOM G7 ) FABIOLA; 1 each once for 1 dose. Use to read blood sugars as per manufacturers instructions    Continuous Glucose Sensor (DEXCOM G7 SENSOR) MISC; 1 each every 10 days. Change sensor every 10 days    Basic metabolic panel  (Ca, Cl, CO2, Creat, Gluc, K, Na, BUN); Future    empagliflozin (JARDIANCE) 10 MG TABS tablet; Take 1 tablet (10 mg) by mouth daily.     "metFORMIN (GLUCOPHAGE-XR) 750 MG 24 hr tablet; Take 2 tablets (1,500 mg) by mouth daily (with dinner).    Moderate episode of recurrent major depressive disorder (H)  In some ways, her anxiety/depression symptoms have improved.  Her RADHA-7 scores have gone down considerably.  However, she still feels as though she struggles with energy and motivation.  She an ominous feeling as she thinks about going back to work next week.  She has not had the opportunity to start therapy yet due to scheduling challenges but she does have an appointment later this week.  She is 3 weeks into a new medicine which seems to be helpful.  Her plan is still maturing.  At this time, she is not yet ready to go back to work.  I will complete [facility] paperwork extending her leave through 6/16 (that is her return to work date).  We discussed her disability claim.  I am concerned that to correctly state that she is disabled will be difficult to defend.  The nature of her work does not allow for accommodations.  She is either working or not working.  We will do our best to try to justify this.       Metabolic syndrome X    Orders:    rosuvastatin (CRESTOR) 10 MG tablet; Take 1 tablet (10 mg) by mouth daily.    Hypercholesteremia    Orders:    rosuvastatin (CRESTOR) 10 MG tablet; Take 1 tablet (10 mg) by mouth daily.      The longitudinal plan of care for the diagnosis(es)/condition(s) as documented were addressed during this visit. Due to the added complexity in care, I will continue to support Zahraa in the subsequent management and with ongoing continuity of care.      Chidi Vital is a 54 year old, presenting for the following health issues:   Follow Up        5/28/2025     7:51 AM   Additional Questions   Roomed by xl     Anxiety and depression:   - she is doing better with anxiety.   - she feels the pressure of her return to work date looming (June 2)    - she is concerned that her work environment will continue to be a challenge.  \"It is " "not going to be different.\" \"You are seen but not heard.\"    - Therapy starts Friday.   - continues AA meetings. Now meeting with a sponsor.  This has helped.     - Nutrition: \"not well.\" Drinking a lot of pop. Food is unhealthy.  \"Sweets are high.\"  This is related to her stress level.    - fatigue as a symptom is worse. She experiences being more tired all day.  Sleep is fragmented and disrupted.      Diabetes mellitus:    - diet and control worse.      - BG levels:  not checking   - \"I need to get that [BG levels] down\"   - she had sulfur burps.     History of Present Illness       Mental Health Follow-up:  Patient presents to follow-up on Depression & Anxiety.Patient's depression since last visit has been:  Better  The patient is having other symptoms associated with depression.  Patient's anxiety since last visit has been:  No change  The patient is having other symptoms associated with anxiety.  Any significant life events: job concerns, financial concerns and health concerns  Patient is feeling anxious or having panic attacks.  Patient has no concerns about alcohol or drug use.    She eats 0-1 servings of fruits and vegetables daily.She consumes 4 sweetened beverage(s) daily.She exercises with enough effort to increase her heart rate 9 or less minutes per day.  She exercises with enough effort to increase her heart rate 3 or less days per week.   She is taking medications regularly.            5/7/2025     6:38 AM   PHQ   PHQ-9 Total Score 20    Q9: Thoughts of better off dead/self-harm past 2 weeks Several days   F/U: Thoughts of suicide or self-harm No   F/U: Safety concerns No       Patient-reported         5/3/2025    11:10 PM 5/27/2025     8:18 PM   RADHA-7 SCORE   Total Score 14 (moderate anxiety) 4 (minimal anxiety)   Total Score 14  4        Patient-reported         Objective    Vitals - Patient Reported  Systolic (Patient Reported): (!) 156  Diastolic (Patient Reported): (!) 94  Pulse (Patient " Reported): 97        Physical Exam   GENERAL: alert and no distress  EYES: Eyes grossly normal to inspection.  No discharge or erythema, or obvious scleral/conjunctival abnormalities.  RESP: No audible wheeze, cough, or visible cyanosis.    SKIN: Visible skin clear. No significant rash, abnormal pigmentation or lesions.  NEURO: Cranial nerves grossly intact.  Mentation and speech appropriate for age.  PSYCH: Appropriate affect, tone, and pace of words        Video-Visit Details    Type of service:  Video Visit   Originating Location (pt. Location): Home    Distant Location (provider location):  On-site  Platform used for Video Visit: Reji  Signed Electronically by: Abimael Walker MD

## 2025-06-02 ENCOUNTER — PATIENT OUTREACH (OUTPATIENT)
Dept: CARE COORDINATION | Facility: CLINIC | Age: 55
End: 2025-06-02
Payer: COMMERCIAL

## 2025-06-03 ENCOUNTER — PATIENT OUTREACH (OUTPATIENT)
Dept: CARE COORDINATION | Facility: CLINIC | Age: 55
End: 2025-06-03
Payer: COMMERCIAL

## 2025-06-09 ENCOUNTER — PATIENT OUTREACH (OUTPATIENT)
Dept: FAMILY MEDICINE | Facility: CLINIC | Age: 55
End: 2025-06-09
Payer: COMMERCIAL

## 2025-06-09 PROBLEM — R87.810 CERVICAL HIGH RISK HPV (HUMAN PAPILLOMAVIRUS) TEST POSITIVE: Status: ACTIVE | Noted: 2024-06-17

## 2025-07-09 ENCOUNTER — MYC MEDICAL ADVICE (OUTPATIENT)
Dept: FAMILY MEDICINE | Facility: CLINIC | Age: 55
End: 2025-07-09
Payer: COMMERCIAL

## 2025-07-12 ENCOUNTER — HEALTH MAINTENANCE LETTER (OUTPATIENT)
Age: 55
End: 2025-07-12

## 2025-08-23 ENCOUNTER — HEALTH MAINTENANCE LETTER (OUTPATIENT)
Age: 55
End: 2025-08-23

## (undated) DEVICE — CUSTOM PACK LAP CHOLE SBA5BLCHEA

## (undated) DEVICE — SU VICRYL+ 3-0 27IN SH UND VCP416H

## (undated) DEVICE — DAVINCI XI DRAPE ARM 470015

## (undated) DEVICE — SYR 50ML SLIP TIP W/O NDL 309654

## (undated) DEVICE — DRAPE SHEET REV FOLD 3/4 9349

## (undated) DEVICE — DAVINCI XI OBTURATOR BLADELESS 8MM 470359

## (undated) DEVICE — GLOVE BIOGEL PI INDICATOR 8.0 LF 41680

## (undated) DEVICE — DRAPE U SPLIT 74X120" 29440

## (undated) DEVICE — SUTURE PASSOR W/GUIDE RSG-14F-4-WG

## (undated) DEVICE — GLOVE BIOGEL PI ULTRATOUCH G SZ 7.5 42175

## (undated) DEVICE — DECANTER VIAL 2006S

## (undated) DEVICE — SU MONOCRYL+ 4-0 18IN PS2 UND MCP496G

## (undated) DEVICE — NDL INSUFFLATION 13GA 120MM C2201

## (undated) DEVICE — Device

## (undated) DEVICE — ENDO POUCH UNIVERSAL RETRIEVAL SYSTEM INZII 5MM CD003

## (undated) DEVICE — SU DERMABOND ADVANCED .7ML DNX12

## (undated) DEVICE — ELECTRODE PATIENT RETURN ADULT L10 FT 2 PLATE CORD 0855C

## (undated) DEVICE — DAVINCI XI SEAL UNIVERSAL 5-12MM 470500

## (undated) DEVICE — LUBRICANT INST ELECTROLUBE EL101

## (undated) DEVICE — TUBING SMOKE EVAC PNEUMOCLEAR HIGH FLOW 0620050250

## (undated) DEVICE — SUTURE VICRYL+ 0 27IN CT-1 UND VCP260H

## (undated) DEVICE — GOWN SRG XL LVL 3 NONREINFORCE SET IN SLV STRL LF 9545

## (undated) DEVICE — SOL WATER IRRIG 1000ML BOTTLE 2F7114

## (undated) DEVICE — PREP CHLORAPREP 26ML TINTED HI-LITE ORANGE 930815

## (undated) DEVICE — DAVINCI XI DRAPE COLUMN 470341

## (undated) DEVICE — CLIP ENDO HEMO-LOC PURPLE LG 544240

## (undated) RX ORDER — LIDOCAINE HYDROCHLORIDE 10 MG/ML
INJECTION, SOLUTION EPIDURAL; INFILTRATION; INTRACAUDAL; PERINEURAL
Status: DISPENSED
Start: 2024-09-13

## (undated) RX ORDER — PROPOFOL 10 MG/ML
INJECTION, EMULSION INTRAVENOUS
Status: DISPENSED
Start: 2024-09-13

## (undated) RX ORDER — FENTANYL CITRATE 50 UG/ML
INJECTION, SOLUTION INTRAMUSCULAR; INTRAVENOUS
Status: DISPENSED
Start: 2024-09-13

## (undated) RX ORDER — DEXAMETHASONE SODIUM PHOSPHATE 10 MG/ML
INJECTION, EMULSION INTRAMUSCULAR; INTRAVENOUS
Status: DISPENSED
Start: 2024-09-13

## (undated) RX ORDER — ONDANSETRON 2 MG/ML
INJECTION INTRAMUSCULAR; INTRAVENOUS
Status: DISPENSED
Start: 2024-09-13

## (undated) RX ORDER — DEXAMETHASONE SODIUM PHOSPHATE 4 MG/ML
INJECTION, SOLUTION INTRA-ARTICULAR; INTRALESIONAL; INTRAMUSCULAR; INTRAVENOUS; SOFT TISSUE
Status: DISPENSED
Start: 2024-09-13

## (undated) RX ORDER — LABETALOL HYDROCHLORIDE 5 MG/ML
INJECTION, SOLUTION INTRAVENOUS
Status: DISPENSED
Start: 2024-09-13